# Patient Record
Sex: FEMALE | Race: WHITE | NOT HISPANIC OR LATINO | Employment: FULL TIME | URBAN - METROPOLITAN AREA
[De-identification: names, ages, dates, MRNs, and addresses within clinical notes are randomized per-mention and may not be internally consistent; named-entity substitution may affect disease eponyms.]

---

## 2017-01-02 DIAGNOSIS — F33.0 MILD RECURRENT MAJOR DEPRESSION (H): Primary | ICD-10-CM

## 2017-01-02 RX ORDER — SERTRALINE HYDROCHLORIDE 100 MG/1
200 TABLET, FILM COATED ORAL DAILY
Qty: 180 TABLET | Refills: 1 | Status: SHIPPED | OUTPATIENT
Start: 2017-01-02 | End: 2017-06-02

## 2017-01-02 NOTE — TELEPHONE ENCOUNTER
sertraline     Last Written Prescription Date: 10/20/16  Last Fill Quantity: 180, # refills: 0  [place] with [facility] [place] provider:  5/13/16        Last PHQ-9 score on record=   PHQ-9 SCORE 11/17/2016   Total Score -   Total Score MyChart -   Total Score 2

## 2017-01-02 NOTE — TELEPHONE ENCOUNTER
Prescription approved per [facility] Refill Protocol.  SHERIE Enamorado

## 2017-03-24 DIAGNOSIS — G47.00 INSOMNIA, UNSPECIFIED TYPE: ICD-10-CM

## 2017-03-27 RX ORDER — TRAZODONE HYDROCHLORIDE 50 MG/1
TABLET, FILM COATED ORAL
Qty: 90 TABLET | Refills: 0 | Status: SHIPPED | OUTPATIENT
Start: 2017-03-27 | End: 2017-06-27

## 2017-03-27 NOTE — TELEPHONE ENCOUNTER
Medication Detail      Disp Refills Start End TARI   traZODone (DESYREL) 50 MG tablet 90 tablet 1 10/19/2016  No   Sig: Take 1 tablet (50 mg) by mouth nightly as needed       Last Office Visit with [facility], [facility] or [facility] prescribing provider:  8/22/16        Last PHQ-9 score on record=   PHQ-9 SCORE 11/17/2016   Total Score MyChart -   Total Score 2       No results found for: AST  No results found for: ALT

## 2017-05-10 ENCOUNTER — OFFICE VISIT (OUTPATIENT)
Dept: FAMILY MEDICINE | Facility: CLINIC | Age: 52
End: 2017-05-10
Payer: COMMERCIAL

## 2017-05-10 VITALS
TEMPERATURE: 99.1 F | DIASTOLIC BLOOD PRESSURE: 102 MMHG | HEIGHT: 66 IN | OXYGEN SATURATION: 98 % | BODY MASS INDEX: 32.47 KG/M2 | HEART RATE: 95 BPM | RESPIRATION RATE: 16 BRPM | SYSTOLIC BLOOD PRESSURE: 162 MMHG | WEIGHT: 202 LBS

## 2017-05-10 DIAGNOSIS — J20.9 ACUTE BRONCHITIS, UNSPECIFIED ORGANISM: Primary | ICD-10-CM

## 2017-05-10 DIAGNOSIS — I10 HYPERTENSION GOAL BP (BLOOD PRESSURE) < 140/90: ICD-10-CM

## 2017-05-10 PROCEDURE — 99214 OFFICE O/P EST MOD 30 MIN: CPT | Performed by: FAMILY MEDICINE

## 2017-05-10 RX ORDER — DOXYCYCLINE 100 MG/1
100 CAPSULE ORAL 2 TIMES DAILY
Qty: 20 CAPSULE | Refills: 0 | Status: SHIPPED | OUTPATIENT
Start: 2017-05-10 | End: 2017-05-20

## 2017-05-10 RX ORDER — ALBUTEROL SULFATE 90 UG/1
2 AEROSOL, METERED RESPIRATORY (INHALATION) EVERY 4 HOURS PRN
Qty: 1 INHALER | Refills: 0 | Status: SHIPPED | OUTPATIENT
Start: 2017-05-10 | End: 2018-04-25

## 2017-05-10 NOTE — MR AVS SNAPSHOT
After Visit Summary   5/10/2017    Trini Ramirez    MRN: [id number]           Patient Information     Date Of Birth          1965        Visit Information        Provider Department      5/10/2017 9:00 AM Alejandro New MD [facility]        Today's Diagnoses     Acute bronchitis, unspecified organism    -  1    Hypertension goal BP (blood pressure) < 140/90           Follow-ups after your visit        Your next 10 appointments already scheduled     May 31, 2017  3:00 PM CDT   Nurse Only with HW MEDICAL ASSISTANT   [facility]    [address] [place] [address] [phone]              Who to contact     If you have questions or need follow up information about today's clinic visit or your schedule please contact [facility] directly at [phone].  Normal or non-critical lab and imaging results will be communicated to you by MyChart, letter or phone within 4 business days after the clinic has received the results. If you do not hear from us within 7 days, please contact the clinic through [employer] or phone. If you have a critical or abnormal lab result, we will notify you by phone as soon as possible.  Submit refill requests through [employer] or call your pharmacy and they will forward the refill request to us. Please allow 3 business days for your refill to be completed.          Additional Information About Your Visit        MyChart Information     [employer] gives you secure access to your electronic health record. If you see a primary care provider, you can also send messages to your care team and make appointments. If you have questions, please call your primary care clinic.  If you do not have a primary care provider, please call [phone] and they will assist you.        Care EveryWhere ID     This is your Care EveryWhere ID. This could be used by other organizations to access your [place]  "medical records  [id number]        Your Vitals Were     Pulse Temperature Respirations Height Pulse Oximetry BMI (Body Mass Index)    95 99.1  F (37.3  C) (Tympanic) 16 5' 6\" (1.676 m) 98% 32.6 kg/m2       Blood Pressure from Last 3 Encounters:   05/10/17 (!) 162/102   05/13/16 138/88   02/16/16 (!) 124/96    Weight from Last 3 Encounters:   05/10/17 202 lb (91.6 kg)   05/13/16 199 lb (90.3 kg)   02/16/16 193 lb (87.5 kg)              Today, you had the following     No orders found for display         Today's Medication Changes          These changes are accurate as of: 5/10/17 10:13 AM.  If you have any questions, ask your nurse or doctor.               Start taking these medicines.        Dose/Directions    albuterol 108 (90 BASE) MCG/ACT Inhaler   Commonly known as:  PROAIR HFA/PROVENTIL HFA/VENTOLIN HFA   Used for:  Acute bronchitis, unspecified organism        Dose:  2 puff   Inhale 2 puffs into the lungs every 4 hours as needed   Quantity:  1 Inhaler   Refills:  0       doxycycline 100 MG capsule   Commonly known as:  VIBRAMYCIN   Used for:  Acute bronchitis, unspecified organism        Dose:  100 mg   Take 1 capsule (100 mg) by mouth 2 times daily for 10 days   Quantity:  20 capsule   Refills:  0            Where to get your medicines      These medications were sent to [facility] [address] [place] [address] AT [facility] & [address] [place] [address]    Hours:  24-hours Phone:  [phone]     albuterol 108 (90 BASE) MCG/ACT Inhaler    doxycycline 100 MG capsule                Primary Care Provider Office Phone # Fax #    Lorrie Macias PA-C [phone]       [facility]       [address] [facility] [address]        Thank you!     Thank you for choosing [facility]  for your care. Our goal is always to provide you with excellent care. Hearing back from our patients is one way " we can continue to improve our services. Please take a few minutes to complete the written survey that you may receive in the mail after your visit with us. Thank you!             Your Updated Medication List - Protect others around you: Learn how to safely use, store and throw away your medicines at www.disposemymeds.org.          This list is accurate as of: 5/10/17 10:13 AM.  Always use your most recent med list.                   Brand Name Dispense Instructions for use    albuterol 108 (90 BASE) MCG/ACT Inhaler    PROAIR HFA/PROVENTIL HFA/VENTOLIN HFA    1 Inhaler    Inhale 2 puffs into the lungs every 4 hours as needed       doxycycline 100 MG capsule    VIBRAMYCIN    20 capsule    Take 1 capsule (100 mg) by mouth 2 times daily for 10 days       fluticasone 50 MCG/ACT spray    FLONASE    1 Package    Spray 1-2 sprays into both nostrils daily       sertraline 100 MG tablet    ZOLOFT    180 tablet    Take 2 tablets (200 mg) by mouth daily       traZODone 50 MG tablet    DESYREL    90 tablet    TAKE 1 TABLET(50 MG) BY MOUTH EVERY NIGHT AS NEEDED

## 2017-05-10 NOTE — NURSING NOTE
"Chief Complaint   Patient presents with     URI     cough       Initial BP (!) 160/110 (BP Location: Left arm, Patient Position: Chair, Cuff Size: Adult Regular)  Pulse 95  Temp 99.1  F (37.3  C) (Tympanic)  Resp 16  Ht 5' 6\" (1.676 m)  Wt 202 lb (91.6 kg)  SpO2 98%  BMI 32.6 kg/m2 Estimated body mass index is 32.6 kg/(m^2) as calculated from the following:    Height as of this encounter: 5' 6\" (1.676 m).    Weight as of this encounter: 202 lb (91.6 kg).  Medication Reconciliation: complete     Mackenzie Appiah      "

## 2017-05-10 NOTE — PROGRESS NOTES
"  SUBJECTIVE:                                                    Trini Ramirez is a 52 year old female who presents to clinic today for the following health issues:      RESPIRATORY SYMPTOMS      Duration: one week     Description  Dry cough, subjective fevers, shortness of breath while coughing, mild rhinorrhea and nasal congestion, mild sore throat    Accompanying signs and symptoms: see above     History (predisposing factors): none     Precipitating or alleviating factors: None    Therapies tried and outcome:  none     No recent travel.   No smoking.   Sleep not disrupted.     H/o HTN - Two years ago pt took antihypertensive for 2 months after her Mom . Since then her B/P's have been normal.       Problem list and histories reviewed & adjusted, as indicated.  Additional history: as documented    Labs reviewed in EPIC    Reviewed and updated as needed this visit by clinical staff  Allergies  Meds       Reviewed and updated as needed this visit by Provider         ROS:  Constitutional, HEENT, cardiovascular, pulmonary, gi and gu systems are negative, except as otherwise noted.    OBJECTIVE:                                                    BP (!) 160/110 (BP Location: Left arm, Patient Position: Chair, Cuff Size: Adult Regular)  Pulse 95  Temp 99.1  F (37.3  C) (Tympanic)  Resp 16  Ht 5' 6\" (1.676 m)  Wt 202 lb (91.6 kg)  SpO2 98%  BMI 32.6 kg/m2  Body mass index is 32.6 kg/(m^2).  GENERAL: healthy, alert and no distress, harsh sounding cough   HENT: mouth without ulcers or lesions  NECK: no adenopathy  RESP: lungs clear to auscultation - no rales, rhonchi or wheezes  CV: regular rate and rhythm, normal S1 S2, no S3 or S4    Diagnostic Test Results:  none      ASSESSMENT/PLAN:                                                      1. Acute bronchitis, unspecified organism  - continue symptomatic tx   - doxycycline (VIBRAMYCIN) 100 MG capsule; Take 1 capsule (100 mg) by mouth 2 times daily for 10 days  " Dispense: 20 capsule; Refill: 0  - albuterol (PROAIR HFA/PROVENTIL HFA/VENTOLIN HFA) 108 (90 BASE) MCG/ACT Inhaler; Inhale 2 puffs into the lungs every 4 hours as needed  Dispense: 1 Inhaler; Refill: 0  - Follow if symptoms worsen or fail to improve.    2. Hypertension goal BP (blood pressure) < 140/90  - Pt is currently not on anti-hypertensive's. Two years ago pt took antihypertensive for 2 months after her Mom . Since then her B/P's have been normal. Today her B/P was elevated. I recommended f/u in 1 week for MA blood pressure recheck.       Alejandro New MD  [facility]

## 2017-05-11 ASSESSMENT — PATIENT HEALTH QUESTIONNAIRE - PHQ9: SUM OF ALL RESPONSES TO PHQ QUESTIONS 1-9: 2

## 2017-05-12 ENCOUNTER — RADIANT APPOINTMENT (OUTPATIENT)
Dept: GENERAL RADIOLOGY | Facility: CLINIC | Age: 52
End: 2017-05-12
Attending: INTERNAL MEDICINE
Payer: COMMERCIAL

## 2017-05-12 ENCOUNTER — TELEPHONE (OUTPATIENT)
Dept: FAMILY MEDICINE | Facility: CLINIC | Age: 52
End: 2017-05-12

## 2017-05-12 ENCOUNTER — OFFICE VISIT (OUTPATIENT)
Dept: URGENT CARE | Facility: URGENT CARE | Age: 52
End: 2017-05-12
Payer: COMMERCIAL

## 2017-05-12 VITALS
TEMPERATURE: 98.1 F | OXYGEN SATURATION: 98 % | BODY MASS INDEX: 32.28 KG/M2 | WEIGHT: 200 LBS | DIASTOLIC BLOOD PRESSURE: 119 MMHG | SYSTOLIC BLOOD PRESSURE: 203 MMHG | HEART RATE: 92 BPM

## 2017-05-12 DIAGNOSIS — R05.9 COUGH: ICD-10-CM

## 2017-05-12 DIAGNOSIS — J20.9 ACUTE BRONCHITIS WITH SYMPTOMS > 10 DAYS: ICD-10-CM

## 2017-05-12 DIAGNOSIS — I10 UNCONTROLLED HYPERTENSION: ICD-10-CM

## 2017-05-12 DIAGNOSIS — J98.09 BRONCHOSPASTIC AIRWAY DISEASE: ICD-10-CM

## 2017-05-12 DIAGNOSIS — R05.9 COUGH: Primary | ICD-10-CM

## 2017-05-12 PROCEDURE — 99214 OFFICE O/P EST MOD 30 MIN: CPT | Mod: 25 | Performed by: INTERNAL MEDICINE

## 2017-05-12 PROCEDURE — 71020 XR CHEST 2 VW: CPT

## 2017-05-12 PROCEDURE — 94640 AIRWAY INHALATION TREATMENT: CPT | Performed by: INTERNAL MEDICINE

## 2017-05-12 RX ORDER — LISINOPRIL 10 MG/1
10 TABLET ORAL DAILY
Qty: 14 TABLET | Refills: 0 | Status: SHIPPED | OUTPATIENT
Start: 2017-05-12 | End: 2017-05-18

## 2017-05-12 RX ORDER — ALBUTEROL SULFATE 0.83 MG/ML
1 SOLUTION RESPIRATORY (INHALATION) ONCE
Qty: 1 VIAL | Refills: 0
Start: 2017-05-12 | End: 2020-03-18

## 2017-05-12 RX ORDER — PREDNISONE 20 MG/1
40 TABLET ORAL DAILY
Qty: 10 TABLET | Refills: 0 | Status: SHIPPED | OUTPATIENT
Start: 2017-05-12 | End: 2017-05-18

## 2017-05-12 RX ORDER — CODEINE PHOSPHATE AND GUAIFENESIN 10; 100 MG/5ML; MG/5ML
1 SOLUTION ORAL EVERY 4 HOURS PRN
Qty: 120 ML | Refills: 0 | Status: SHIPPED | OUTPATIENT
Start: 2017-05-12 | End: 2017-05-18

## 2017-05-12 NOTE — TELEPHONE ENCOUNTER
"Trini Ramirez is a 52 year old female who calls with worsening bronchitis.    NURSING ASSESSMENT:  Description:  Patient was seen 5/10/2017 - Mohamed - prescribe doxycycline and albuterol for bronchitis - patient calling due to worsening symptoms - seeks nursing advice  Onset/duration:  2 days  Precip. factors:  bronchitis  Associated symptoms:    1. Cough - started to have more \"crackles\"   -non-productive    2. Albuterol - every 4 hours - 2 puffs - ineffective - continues with wheezes - cannot clear with coughing  3. Denies SOB - states only when coughing  4. Denies ear pain, sinus congestion, body aches, nausea, vomiting, red eyes, rash      Last exam/Treatment:  5/10/2017  Allergies:   Allergies   Allergen Reactions     Seasonal Allergies        MEDICATIONS:   Taking medication(s) as prescribed? Yes  Taking over the counter medication(s?) unknown  Any medication side effects? No significant side effects    Any barriers to taking medication(s) as prescribed?  No  Medication(s) improving/managing symptoms?  No  Medication reconciliation completed: Yes      NURSING PLAN: Nursing advice to patient please seek UC at this time [facility] - open now for check in - to be evaluated same day patient verbalized understanding - no further questions at this time    RECOMMENDED DISPOSITION:  To ED/UC for evaluation, another person to drive - see above  Will comply with recommendation: Yes  If further questions/concerns or if symptoms do not improve, worsen or new symptoms develop, call your PCP or [facility] as soon as possible.      Guideline used:  Telephone Triage Protocols for Nurses, Fifth Edition, Deepali Howard RN    "

## 2017-05-12 NOTE — PATIENT INSTRUCTIONS
Start lisinopril 10 mg daily.  check blood pressure at home or fire station. Goal blood pressure less than 140/90. May need to take lisinopril 10 mg daily to meet goal.   Avoid caffeine and salt. No decongestants.      Continue albuterol inhaler for cough.  Start prednisone burst  robitussin with codeine.    Recheck appointment next week for cough and blood pressure.

## 2017-05-12 NOTE — TELEPHONE ENCOUNTER
Medication Detail      Disp Refills Start End TARI   lisinopril (PRINIVIL/ZESTRIL) 10 MG tablet 14 tablet 0 5/12/2017  --   Sig: Take 1 tablet (10 mg) by mouth daily   Class: E-Prescribe   Route: Oral   Order: [id number]       Last Office Visit with [facility], [facility] or [facility] prescribing provider: 8/22/2016  Next 5 appointments (look out 90 days)     May 18, 2017  4:00 PM CDT   Office Visit with Lorrie Macias PA-C   [facility]    [address] [place] [address] [phone]            May 31, 2017  3:00 PM CDT   Nurse Only with  MEDICAL ASSISTANT   [facility] ([facility])    [address] [place] [address] [phone]                   Potassium   Date Value Ref Range Status   02/12/2016 3.8 3.4 - 5.3 mmol/L Final     Creatinine   Date Value Ref Range Status   02/12/2016 0.69 0.52 - 1.04 mg/dL Final     BP Readings from Last 3 Encounters:   05/12/17 (!) 203/119   05/10/17 (!) 162/102   05/13/16 138/88     Patient is requesting a 90 day supply

## 2017-05-12 NOTE — MR AVS SNAPSHOT
After Visit Summary   5/12/2017    Trini Ramirez    MRN: [id number]           Patient Information     Date Of Birth          1965        Visit Information        Provider Department      5/12/2017 5:00 PM Crissy Mejia MD [facility]        Today's Diagnoses     Cough    -  1    Uncontrolled hypertension        Acute bronchitis with symptoms > 10 days        Bronchospastic airway disease          Care Instructions    Start lisinopril 10 mg daily.  check blood pressure at home or fire station. Goal blood pressure less than 140/90. May need to take lisinopril 10 mg daily to meet goal.   Avoid caffeine and salt. No decongestants.      Continue albuterol inhaler for cough.  Start prednisone burst  robitussin with codeine.    Recheck appointment next week for cough and blood pressure.              Follow-ups after your visit        Your next 10 appointments already scheduled     May 31, 2017  3:00 PM CDT   Nurse Only with HW MEDICAL ASSISTANT   [facility]    [address] [place] [address] [phone]              Who to contact     If you have questions or need follow up information about today's clinic visit or your schedule please contact [facility] directly at [phone].  Normal or non-critical lab and imaging results will be communicated to you by MyChart, letter or phone within 4 business days after the clinic has received the results. If you do not hear from us within 7 days, please contact the clinic through [employer] or phone. If you have a critical or abnormal lab result, we will notify you by phone as soon as possible.  Submit refill requests through [employer] or call your pharmacy and they will forward the refill request to us. Please allow 3 business days for your refill to be completed.          Additional Information About Your Visit        MyChart Information      [employer] gives you secure access to your electronic health record. If you see a primary care provider, you can also send messages to your care team and make appointments. If you have questions, please call your primary care clinic.  If you do not have a primary care provider, please call [phone] and they will assist you.        Care EveryWhere ID     This is your Care EveryWhere ID. This could be used by other organizations to access your [place] medical records  [id number]        Your Vitals Were     Pulse Temperature Pulse Oximetry BMI (Body Mass Index)          92 98.1  F (36.7  C) (Tympanic) 98% 32.28 kg/m2         Blood Pressure from Last 3 Encounters:   05/12/17 (!) 203/119   05/10/17 (!) 162/102   05/13/16 138/88    Weight from Last 3 Encounters:   05/12/17 200 lb (90.7 kg)   05/10/17 202 lb (91.6 kg)   05/13/16 199 lb (90.3 kg)              We Performed the Following     INHALATION/NEBULIZER TREATMENT, INITIAL          Today's Medication Changes          These changes are accurate as of: 5/12/17  5:52 PM.  If you have any questions, ask your nurse or doctor.               Start taking these medicines.        Dose/Directions    guaiFENesin-codeine 100-10 MG/5ML Soln solution   Commonly known as:  ROBITUSSIN AC   Used for:  Acute bronchitis with symptoms > 10 days, Cough, Bronchospastic airway disease   Started by:  Crissy Mejia MD        Dose:  1 tsp.   Take 5 mLs by mouth every 4 hours as needed for cough   Quantity:  120 mL   Refills:  0       lisinopril 10 MG tablet   Commonly known as:  PRINIVIL/ZESTRIL   Used for:  Cough   Started by:  Crissy Mejia MD        Dose:  10 mg   Take 1 tablet (10 mg) by mouth daily   Quantity:  14 tablet   Refills:  0       predniSONE 20 MG tablet   Commonly known as:  DELTASONE   Used for:  Cough, Acute bronchitis with symptoms > 10 days   Started by:  Crissy Mejia MD        Dose:  40 mg   Take 2 tablets (40 mg) by mouth daily for 5  days   Quantity:  10 tablet   Refills:  0         These medicines have changed or have updated prescriptions.        Dose/Directions    * albuterol 108 (90 BASE) MCG/ACT Inhaler   Commonly known as:  PROAIR HFA/PROVENTIL HFA/VENTOLIN HFA   This may have changed:  Another medication with the same name was added. Make sure you understand how and when to take each.   Used for:  Acute bronchitis, unspecified organism   Changed by:  Alejandro New MD        Dose:  2 puff   Inhale 2 puffs into the lungs every 4 hours as needed   Quantity:  1 Inhaler   Refills:  0       * albuterol (2.5 MG/3ML) 0.083% neb solution   This may have changed:  You were already taking a medication with the same name, and this prescription was added. Make sure you understand how and when to take each.   Used for:  Cough   Changed by:  Crissy Mejia MD        Dose:  1 vial   Take 1 vial (2.5 mg) by nebulization once for 1 dose   Quantity:  1 vial   Refills:  0       * Notice:  This list has 2 medication(s) that are the same as other medications prescribed for you. Read the directions carefully, and ask your doctor or other care provider to review them with you.         Where to get your medicines      These medications were sent to [facility] [address] [place] [id number]    Hours:  24-hours Phone:  [phone]     lisinopril 10 MG tablet    predniSONE 20 MG tablet         Some of these will need a paper prescription and others can be bought over the counter.  Ask your nurse if you have questions.     Bring a paper prescription for each of these medications     guaiFENesin-codeine 100-10 MG/5ML Soln solution       You don't need a prescription for these medications     albuterol (2.5 MG/3ML) 0.083% neb solution                Primary Care Provider Office Phone # Fax #    Lorrie Macias PA-C [phone]        [facility]       [address]            [facility] [address]        Thank you!     Thank you for choosing [facility]  for your care. Our goal is always to provide you with excellent care. Hearing back from our patients is one way we can continue to improve our services. Please take a few minutes to complete the written survey that you may receive in the mail after your visit with us. Thank you!             Your Updated Medication List - Protect others around you: Learn how to safely use, store and throw away your medicines at www.disposemymeds.org.          This list is accurate as of: 5/12/17  5:52 PM.  Always use your most recent med list.                   Brand Name Dispense Instructions for use    * albuterol 108 (90 BASE) MCG/ACT Inhaler    PROAIR HFA/PROVENTIL HFA/VENTOLIN HFA    1 Inhaler    Inhale 2 puffs into the lungs every 4 hours as needed       * albuterol (2.5 MG/3ML) 0.083% neb solution     1 vial    Take 1 vial (2.5 mg) by nebulization once for 1 dose       doxycycline 100 MG capsule    VIBRAMYCIN    20 capsule    Take 1 capsule (100 mg) by mouth 2 times daily for 10 days       fluticasone 50 MCG/ACT spray    FLONASE    1 Package    Spray 1-2 sprays into both nostrils daily       guaiFENesin-codeine 100-10 MG/5ML Soln solution    ROBITUSSIN AC    120 mL    Take 5 mLs by mouth every 4 hours as needed for cough       lisinopril 10 MG tablet    PRINIVIL/ZESTRIL    14 tablet    Take 1 tablet (10 mg) by mouth daily       predniSONE 20 MG tablet    DELTASONE    10 tablet    Take 2 tablets (40 mg) by mouth daily for 5 days       sertraline 100 MG tablet    ZOLOFT    180 tablet    Take 2 tablets (200 mg) by mouth daily       traZODone 50 MG tablet    DESYREL    90 tablet    TAKE 1 TABLET(50 MG) BY MOUTH EVERY NIGHT AS NEEDED       * Notice:  This list has 2 medication(s) that are the same as other medications prescribed for you. Read the directions  carefully, and ask your doctor or other care provider to review them with you.

## 2017-05-12 NOTE — NURSING NOTE
"Chief Complaint   Patient presents with     Urgent Care     Pt in clinic c/o worsening cough.     Cough       Initial BP (!) 203/119 (BP Location: Right arm, Patient Position: Chair, Cuff Size: Adult Large)  Pulse 92  Temp 98.1  F (36.7  C) (Tympanic)  Wt 200 lb (90.7 kg)  SpO2 98%  BMI 32.28 kg/m2 Estimated body mass index is 32.28 kg/(m^2) as calculated from the following:    Height as of 5/10/17: 5' 6\" (1.676 m).    Weight as of this encounter: 200 lb (90.7 kg).  Medication Reconciliation: complete   Vanessa Guido/ MA    "

## 2017-05-12 NOTE — PROGRESS NOTES
SUBJECTIVE:  Trini Ramirez, a 52 year old female scheduled an appointment to discuss the following issues:  Chief Complaint   Patient presents with     Urgent Care     Pt in clinic c/o worsening cough.     Cough       At  reviewed chart with providers assessment and plan below  BP Readings from Last 6 Encounters:   17 (!) 203/119   05/10/17 (!) 162/102   16 138/88   16 (!) 124/96   16 (!) 149/105   02/10/16 (!) 140/100     1. Acute bronchitis, unspecified organism  - continue symptomatic tx   - doxycycline (VIBRAMYCIN) 100 MG capsule; Take 1 capsule (100 mg) by mouth 2 times daily for 10 days Dispense: 20 capsule; Refill: 0  - albuterol (PROAIR HFA/PROVENTIL HFA/VENTOLIN HFA) 108 (90 BASE) MCG/ACT Inhaler; Inhale 2 puffs into the lungs every 4 hours as needed Dispense: 1 Inhaler; Refill: 0  - Follow if symptoms worsen or fail to improve.     2. Hypertension goal BP (blood pressure) < 140/90  - Pt is currently not on anti-hypertensive's. Two years ago pt took antihypertensive for 2 months after her Mom . Since then her B/P's have been normal. Today her B/P was elevated. I recommended f/u in 1 week for MA blood pressure recheck.     Patient states she is getting worse since her appointment noted above.  Sick 10 days  Getting worse    Current Outpatient Prescriptions on File Prior to Visit:  doxycycline (VIBRAMYCIN) 100 MG capsule Take 1 capsule (100 mg) by mouth 2 times daily for 10 days   albuterol (PROAIR HFA/PROVENTIL HFA/VENTOLIN HFA) 108 (90 BASE) MCG/ACT Inhaler Inhale 2 puffs into the lungs every 4 hours as needed   traZODone (DESYREL) 50 MG tablet TAKE 1 TABLET(50 MG) BY MOUTH EVERY NIGHT AS NEEDED   sertraline (ZOLOFT) 100 MG tablet Take 2 tablets (200 mg) by mouth daily   fluticasone (FLONASE) 50 MCG/ACT nasal spray Spray 1-2 sprays into both nostrils daily (Patient not taking: Reported on 2017)     No current facility-administered medications on file prior to visit.    Review of chart shows that patient has been on lisinopril and ( labetalol and hydralazine - inpatient)    She is a non-smoker.      Medical, social, surgical, and family histories reviewed and updated as of 5/12/2017.    ROS:  C: NEGATIVE for fever, chills  E: NEGATIVE for vision changes   ENT/MOUTH: Runny stuffy nose  R: She has a wet nonproductive cough with trouble breathing during her coughing spells. When she is laying down at night she thinks she might be wheezing     CV: NEGATIVE for chest pain, palpitations   NEURO: NEGATIVE for weakness, dizziness or paresthesias and denies symptoms of stroke. Denies headache    She states 20 or 30 years ago she used to get a cough like this a year    OBJECTIVE:  BP (!) 203/119 (BP Location: Right arm, Patient Position: Chair, Cuff Size: Adult Large)  Pulse 92  Temp 98.1  F (36.7  C) (Tympanic)  Wt 200 lb (90.7 kg)  SpO2 98%  BMI 32.28 kg/m2  EXAM:  GENERAL APPEARANCE: healthy, alert and no distress  GENERAL APPEARANCE: Tearful  HENT: ear canals and TM's normal and nose and mouth without ulcers or lesions  RESP: lungs clear to auscultation - no rales, rhonchi or wheezes  CV: regular rates and rhythm, normal S1 S2, no S3 or S4 and no murmur, click or rub -    Neb patient states she feels better after the nab. Her coughing frequency significantly decreased in clinic. Lung exam unchanged good aeration without wheeze  chest x-ray appears normal  ASSESSMENT/PLAN:    ASSESSMENT/PLAN:      ICD-10-CM    1. Cough R05 XR Chest 2 Views     albuterol (2.5 MG/3ML) 0.083% neb solution     INHALATION/NEBULIZER TREATMENT, INITIAL     lisinopril (PRINIVIL/ZESTRIL) 10 MG tablet     predniSONE (DELTASONE) 20 MG tablet     guaiFENesin-codeine (ROBITUSSIN AC) 100-10 MG/5ML SOLN solution   2. Uncontrolled hypertension I10    3. Acute bronchitis with symptoms > 10 days J20.9 predniSONE (DELTASONE) 20 MG tablet     guaiFENesin-codeine (ROBITUSSIN AC) 100-10 MG/5ML SOLN solution   4.  Bronchospastic airway disease J98.09 guaiFENesin-codeine (ROBITUSSIN AC) 100-10 MG/5ML SOLN solution       Patient Instructions   Start lisinopril 10 mg daily.  check blood pressure at home or fire station. Goal blood pressure less than 140/90. May need to take lisinopril 10 mg daily to meet goal.   Avoid caffeine and salt. No decongestants.      Continue albuterol inhaler for cough.  Start prednisone burst  robitussin with codeine.    Recheck appointment next week for cough and blood pressure.              Crissy Mejia MD

## 2017-05-16 RX ORDER — LISINOPRIL 10 MG/1
TABLET ORAL
Qty: 0.1 TABLET | Refills: 0 | OUTPATIENT
Start: 2017-05-16

## 2017-05-18 ENCOUNTER — OFFICE VISIT (OUTPATIENT)
Dept: FAMILY MEDICINE | Facility: CLINIC | Age: 52
End: 2017-05-18
Payer: COMMERCIAL

## 2017-05-18 VITALS
BODY MASS INDEX: 33.41 KG/M2 | OXYGEN SATURATION: 98 % | WEIGHT: 207 LBS | SYSTOLIC BLOOD PRESSURE: 178 MMHG | HEART RATE: 80 BPM | RESPIRATION RATE: 17 BRPM | TEMPERATURE: 97.8 F | DIASTOLIC BLOOD PRESSURE: 102 MMHG

## 2017-05-18 DIAGNOSIS — J98.09 BRONCHOSPASTIC AIRWAY DISEASE: ICD-10-CM

## 2017-05-18 DIAGNOSIS — R05.9 COUGH: ICD-10-CM

## 2017-05-18 DIAGNOSIS — J20.9 ACUTE BRONCHITIS WITH SYMPTOMS > 10 DAYS: ICD-10-CM

## 2017-05-18 DIAGNOSIS — I10 HYPERTENSION GOAL BP (BLOOD PRESSURE) < 140/90: Primary | ICD-10-CM

## 2017-05-18 PROCEDURE — 99213 OFFICE O/P EST LOW 20 MIN: CPT | Performed by: PHYSICIAN ASSISTANT

## 2017-05-18 RX ORDER — PREDNISONE 20 MG/1
40 TABLET ORAL DAILY
Qty: 8 TABLET | Refills: 0 | Status: SHIPPED | OUTPATIENT
Start: 2017-05-18 | End: 2017-05-22

## 2017-05-18 RX ORDER — LISINOPRIL 20 MG/1
20 TABLET ORAL DAILY
Qty: 90 TABLET | Refills: 0 | Status: SHIPPED | OUTPATIENT
Start: 2017-05-18 | End: 2017-08-22

## 2017-05-18 RX ORDER — CODEINE PHOSPHATE AND GUAIFENESIN 10; 100 MG/5ML; MG/5ML
1 SOLUTION ORAL EVERY 4 HOURS PRN
Qty: 120 ML | Refills: 0 | Status: SHIPPED | OUTPATIENT
Start: 2017-05-18 | End: 2017-06-01

## 2017-05-18 NOTE — NURSING NOTE
"Chief Complaint   Patient presents with     Hypertension     Cough       Initial BP (!) 178/102 (BP Location: Left arm, Patient Position: Chair, Cuff Size: Adult Large)  Pulse 80  Temp 97.8  F (36.6  C) (Oral)  Resp 17  Wt 207 lb (93.9 kg)  SpO2 98%  BMI 33.41 kg/m2 Estimated body mass index is 33.41 kg/(m^2) as calculated from the following:    Height as of 5/10/17: 5' 6\" (1.676 m).    Weight as of this encounter: 207 lb (93.9 kg).  Medication Reconciliation: complete     Gus Acevedo CMA  "

## 2017-05-18 NOTE — PROGRESS NOTES
SUBJECTIVE:                                                    Trini Ramirez is a 52 year old female who presents to clinic today for the following health issues:      Hypertension Follow-up      Outpatient blood pressures are being checked at home.  Results are 190/110.    Low Salt Diet: not monitoring salt    Recently restarted on Lisinopril 10 mg daily.    History of being on two medicines in the past, but BPs normalized until recently (mother passed away).       Amount of exercise or physical activity: 1-2 day/week for an average of 15-30 minutes    Problems taking medications regularly: No    Medication side effects: none    Diet: regular (no restrictions)    Patient seen a few times for bronchitis - most recent  visit on 2017 - chest xray negative at that time.    Given prescription for doxycycline x 10 days, prednisone x 5 days and rescue inhaler (albuterol)     Still coughing for 8 days,ribs are sore, sore throat; no fever        Problem list and histories reviewed & adjusted, as indicated.  Additional history: as documented    Patient Active Problem List   Diagnosis     Mild recurrent major depression (H)     CARDIOVASCULAR SCREENING; LDL GOAL LESS THAN 160     Seasonal allergic rhinitis     Metatarsalgia of right foot     Hypertension goal BP (blood pressure) < 140/90     Other closed extra-articular fracture of distal end of left radius, initial encounter     Past Surgical History:   Procedure Laterality Date     AS TMJ ARTHROSCOPY/SURGERY        SECTION       OPEN REDUCTION INTERNAL FIXATION WRIST Left 2016    Procedure: OPEN REDUCTION INTERNAL FIXATION WRIST;  Surgeon: Fritz Reid MD;  Location:  OR       Social History   Substance Use Topics     Smoking status: Never Smoker     Smokeless tobacco: Never Used     Alcohol use 0.0 oz/week     0 Standard drinks or equivalent per week      Comment: occ     Family History   Problem Relation Age of Onset     Depression  Mother      Hypertension Father      Depression Father      Substance Abuse Father      Depression Brother      DIABETES Maternal Grandfather      Substance Abuse Brother          Current Outpatient Prescriptions   Medication Sig Dispense Refill     predniSONE (DELTASONE) 20 MG tablet Take 2 tablets (40 mg) by mouth daily for 4 days 8 tablet 0     lisinopril (PRINIVIL/ZESTRIL) 20 MG tablet Take 1 tablet (20 mg) by mouth daily 90 tablet 0     guaiFENesin-codeine (ROBITUSSIN AC) 100-10 MG/5ML SOLN solution Take 5 mLs by mouth every 4 hours as needed for cough 120 mL 0     albuterol (PROAIR HFA/PROVENTIL HFA/VENTOLIN HFA) 108 (90 BASE) MCG/ACT Inhaler Inhale 2 puffs into the lungs every 4 hours as needed 1 Inhaler 0     traZODone (DESYREL) 50 MG tablet TAKE 1 TABLET(50 MG) BY MOUTH EVERY NIGHT AS NEEDED 90 tablet 0     sertraline (ZOLOFT) 100 MG tablet Take 2 tablets (200 mg) by mouth daily 180 tablet 1     fluticasone (FLONASE) 50 MCG/ACT nasal spray Spray 1-2 sprays into both nostrils daily 1 Package 6     [DISCONTINUED] lisinopril (PRINIVIL/ZESTRIL) 10 MG tablet Take 1 tablet (10 mg) by mouth daily 14 tablet 0     doxycycline (VIBRAMYCIN) 100 MG capsule Take 1 capsule (100 mg) by mouth 2 times daily for 10 days (Patient not taking: Reported on 5/18/2017) 20 capsule 0     Allergies   Allergen Reactions     Seasonal Allergies      BP Readings from Last 3 Encounters:   05/18/17 (!) 178/102   05/12/17 (!) 203/119   05/10/17 (!) 162/102    Wt Readings from Last 3 Encounters:   05/18/17 207 lb (93.9 kg)   05/12/17 200 lb (90.7 kg)   05/10/17 202 lb (91.6 kg)            Reviewed and updated as needed this visit by clinical staff  Tobacco  Allergies  Meds  Problems  Med Hx  Surg Hx  Fam Hx  Soc Hx        Reviewed and updated as needed this visit by Provider  Allergies  Meds  Problems         ROS:  Constitutional, HEENT, cardiovascular, pulmonary, gi and gu systems are negative, except as otherwise  noted.    OBJECTIVE:                                                    BP (!) 178/102 (BP Location: Left arm, Patient Position: Chair, Cuff Size: Adult Large)  Pulse 80  Temp 97.8  F (36.6  C) (Oral)  Resp 17  Wt 207 lb (93.9 kg)  SpO2 98%  BMI 33.41 kg/m2  Body mass index is 33.41 kg/(m^2).  GENERAL: healthy, alert and no distress  EYES: Eyes grossly normal to inspection, PERRL and conjunctivae and sclerae normal  HENT: ear canals and TM's normal, nose and mouth without ulcers or lesions  NECK: no adenopathy, no asymmetry, masses, or scars and thyroid normal to palpation  RESP: lungs clear to auscultation - no rales, rhonchi or wheezes  CV: regular rate and rhythm, normal S1 S2, no S3 or S4, no murmur, click or rub, no peripheral edema and peripheral pulses strong  PSYCH: mentation appears normal, affect normal/bright    Diagnostic Test Results:  none      ASSESSMENT/PLAN:                                                        ICD-10-CM    1. Hypertension goal BP (blood pressure) < 140/90 I10    2. Acute bronchitis with symptoms > 10 days J20.9 predniSONE (DELTASONE) 20 MG tablet     guaiFENesin-codeine (ROBITUSSIN AC) 100-10 MG/5ML SOLN solution   3. Cough R05 predniSONE (DELTASONE) 20 MG tablet     lisinopril (PRINIVIL/ZESTRIL) 20 MG tablet     guaiFENesin-codeine (ROBITUSSIN AC) 100-10 MG/5ML SOLN solution   4. Bronchospastic airway disease J98.09 guaiFENesin-codeine (ROBITUSSIN AC) 100-10 MG/5ML SOLN solution       Patient Instructions   Increase Lisinopril to 20 mg daily then follow up with MA visit as previously scheduled.    Encouraged mucinex/guafenisin, warm salt water gargles, cepacol spray, soothers/lozenges, sinus rinses (neilmed), vitamin c, fluids and rest.  May alternate tylenol and NSAIDS (ibuprofen, advil, aleve type products) every 4-6 hours for the next few days as needed.    Complete previous prescription for doxycycline.  Continue with rescue inhaler (albuterol) - 2 puffs every 4-6 hours  as needed.  Prescription for repeat round of oral prednisone sent to pharmacy as well.  Return to clinic with any worsening or changes in symptoms.       Lorrie Macias PA-C  [facility]

## 2017-05-18 NOTE — PATIENT INSTRUCTIONS
Increase Lisinopril to 20 mg daily then follow up with MA visit as previously scheduled.    Encouraged mucinex/guafenisin, warm salt water gargles, cepacol spray, soothers/lozenges, sinus rinses (neilmed), vitamin c, fluids and rest.  May alternate tylenol and NSAIDS (ibuprofen, advil, aleve type products) every 4-6 hours for the next few days as needed.    Complete previous prescription for doxycycline.  Continue with rescue inhaler (albuterol) - 2 puffs every 4-6 hours as needed.  Prescription for repeat round of oral prednisone sent to pharmacy as well.  Return to clinic with any worsening or changes in symptoms.

## 2017-05-18 NOTE — MR AVS SNAPSHOT
After Visit Summary   5/18/2017    Trini Ramirez    MRN: [id number]           Patient Information     Date Of Birth          1965        Visit Information        Provider Department      5/18/2017 4:00 PM Lorrie Macias PA-C [facility]        Today's Diagnoses     Hypertension goal BP (blood pressure) < 140/90    -  1    Cough        Acute bronchitis with symptoms > 10 days        Bronchospastic airway disease          Care Instructions    Increase Lisinopril to 20 mg daily then follow up with MA visit as previously scheduled.    Encouraged mucinex/guafenisin, warm salt water gargles, cepacol spray, soothers/lozenges, sinus rinses (neilmed), vitamin c, fluids and rest.  May alternate tylenol and NSAIDS (ibuprofen, advil, aleve type products) every 4-6 hours for the next few days as needed.    Complete previous prescription for doxycycline.  Continue with rescue inhaler (albuterol) - 2 puffs every 4-6 hours as needed.  Prescription for repeat round of oral prednisone sent to pharmacy as well.  Return to clinic with any worsening or changes in symptoms.         Follow-ups after your visit        Follow-up notes from your care team     Return if symptoms worsen or fail to improve.      Your next 10 appointments already scheduled     May 31, 2017  3:00 PM CDT   Nurse Only with [facility] MEDICAL ASSISTANT   [facility] ([facility])    [address] [place] [id number] [phone]              Who to contact     If you have questions or need follow up information about today's clinic visit or your schedule please contact [facility] directly at [phone].  Normal or non-critical lab and imaging results will be communicated to you by MyChart, letter or phone within 4 business days after the clinic has received the results. If you do not hear from us within 7 days, please contact the clinic through MyChart or phone. If  you have a critical or abnormal lab result, we will notify you by phone as soon as possible.  Submit refill requests through [employer] or call your pharmacy and they will forward the refill request to us. Please allow 3 business days for your refill to be completed.          Additional Information About Your Visit        [employer] Information     [employer] gives you secure access to your electronic health record. If you see a primary care provider, you can also send messages to your care team and make appointments. If you have questions, please call your primary care clinic.  If you do not have a primary care provider, please call [phone] and they will assist you.        Care EveryWhere ID     This is your Care EveryWhere ID. This could be used by other organizations to access your [place] medical records  [id number]        Your Vitals Were     Pulse Temperature Respirations Pulse Oximetry BMI (Body Mass Index)       80 97.8  F (36.6  C) (Oral) 17 98% 33.41 kg/m2        Blood Pressure from Last 3 Encounters:   05/18/17 (!) 178/102   05/12/17 (!) 203/119   05/10/17 (!) 162/102    Weight from Last 3 Encounters:   05/18/17 207 lb (93.9 kg)   05/12/17 200 lb (90.7 kg)   05/10/17 202 lb (91.6 kg)              Today, you had the following     No orders found for display         Today's Medication Changes          These changes are accurate as of: 5/18/17  4:11 PM.  If you have any questions, ask your nurse or doctor.               Start taking these medicines.        Dose/Directions    predniSONE 20 MG tablet   Commonly known as:  DELTASONE   Used for:  Cough, Acute bronchitis with symptoms > 10 days   Started by:  Lorrie Macias PA-C        Dose:  40 mg   Take 2 tablets (40 mg) by mouth daily for 4 days   Quantity:  8 tablet   Refills:  0         These medicines have changed or have updated prescriptions.        Dose/Directions    lisinopril 20 MG tablet   Commonly known as:  PRINIVIL/ZESTRIL   This may have  changed:    - medication strength  - how much to take   Used for:  Cough   Changed by:  Lorrie Macias PA-C        Dose:  20 mg   Take 1 tablet (20 mg) by mouth daily   Quantity:  90 tablet   Refills:  0            Where to get your medicines      These medications were sent to [id number] - [address] AT [facility] & [address] [place] [id number]    Hours:  24-hours Phone:  [phone]     lisinopril 20 MG tablet    predniSONE 20 MG tablet         Some of these will need a paper prescription and others can be bought over the counter.  Ask your nurse if you have questions.     Bring a paper prescription for each of these medications     guaiFENesin-codeine 100-10 MG/5ML Soln solution                Primary Care Provider Office Phone # Fax #    Lorrie Macias PA-C [phone]       [place] [address] [facility] [address]        Thank you!     Thank you for choosing [facility]  for your care. Our goal is always to provide you with excellent care. Hearing back from our patients is one way we can continue to improve our services. Please take a few minutes to complete the written survey that you may receive in the mail after your visit with us. Thank you!             Your Updated Medication List - Protect others around you: Learn how to safely use, store and throw away your medicines at www.disposemymeds.org.          This list is accurate as of: 5/18/17  4:11 PM.  Always use your most recent med list.                   Brand Name Dispense Instructions for use    * albuterol 108 (90 BASE) MCG/ACT Inhaler    PROAIR HFA/PROVENTIL HFA/VENTOLIN HFA    1 Inhaler    Inhale 2 puffs into the lungs every 4 hours as needed       * albuterol (2.5 MG/3ML) 0.083% neb solution     1 vial    Take 1 vial (2.5 mg) by nebulization once for 1 dose       doxycycline 100 MG  capsule    VIBRAMYCIN    20 capsule    Take 1 capsule (100 mg) by mouth 2 times daily for 10 days       fluticasone 50 MCG/ACT spray    FLONASE    1 Package    Spray 1-2 sprays into both nostrils daily       guaiFENesin-codeine 100-10 MG/5ML Soln solution    ROBITUSSIN AC    120 mL    Take 5 mLs by mouth every 4 hours as needed for cough       lisinopril 20 MG tablet    PRINIVIL/ZESTRIL    90 tablet    Take 1 tablet (20 mg) by mouth daily       predniSONE 20 MG tablet    DELTASONE    8 tablet    Take 2 tablets (40 mg) by mouth daily for 4 days       sertraline 100 MG tablet    ZOLOFT    180 tablet    Take 2 tablets (200 mg) by mouth daily       traZODone 50 MG tablet    DESYREL    90 tablet    TAKE 1 TABLET(50 MG) BY MOUTH EVERY NIGHT AS NEEDED       * Notice:  This list has 2 medication(s) that are the same as other medications prescribed for you. Read the directions carefully, and ask your doctor or other care provider to review them with you.

## 2017-06-01 ENCOUNTER — RADIANT APPOINTMENT (OUTPATIENT)
Dept: GENERAL RADIOLOGY | Facility: CLINIC | Age: 52
End: 2017-06-01
Attending: PHYSICIAN ASSISTANT
Payer: COMMERCIAL

## 2017-06-01 ENCOUNTER — OFFICE VISIT (OUTPATIENT)
Dept: FAMILY MEDICINE | Facility: CLINIC | Age: 52
End: 2017-06-01
Payer: COMMERCIAL

## 2017-06-01 VITALS
DIASTOLIC BLOOD PRESSURE: 109 MMHG | WEIGHT: 204 LBS | HEART RATE: 106 BPM | SYSTOLIC BLOOD PRESSURE: 162 MMHG | TEMPERATURE: 97.4 F | BODY MASS INDEX: 32.93 KG/M2 | OXYGEN SATURATION: 98 %

## 2017-06-01 DIAGNOSIS — I10 HYPERTENSION GOAL BP (BLOOD PRESSURE) < 140/90: ICD-10-CM

## 2017-06-01 DIAGNOSIS — J20.9 ACUTE BRONCHITIS, UNSPECIFIED ORGANISM: ICD-10-CM

## 2017-06-01 DIAGNOSIS — R05.9 COUGH: ICD-10-CM

## 2017-06-01 DIAGNOSIS — J98.09 BRONCHOSPASTIC AIRWAY DISEASE: ICD-10-CM

## 2017-06-01 DIAGNOSIS — J20.9 ACUTE BRONCHITIS, UNSPECIFIED ORGANISM: Primary | ICD-10-CM

## 2017-06-01 PROCEDURE — 71020 XR CHEST 2 VW: CPT

## 2017-06-01 PROCEDURE — 99213 OFFICE O/P EST LOW 20 MIN: CPT | Performed by: PHYSICIAN ASSISTANT

## 2017-06-01 RX ORDER — LISINOPRIL 40 MG/1
40 TABLET ORAL DAILY
Qty: 90 TABLET | Refills: 0 | Status: CANCELLED | OUTPATIENT
Start: 2017-06-01

## 2017-06-01 RX ORDER — CODEINE PHOSPHATE AND GUAIFENESIN 10; 100 MG/5ML; MG/5ML
1 SOLUTION ORAL EVERY 4 HOURS PRN
Qty: 120 ML | Refills: 0 | Status: SHIPPED | OUTPATIENT
Start: 2017-06-01 | End: 2017-07-11

## 2017-06-01 NOTE — PROGRESS NOTES
"Sol Feliz  Your attached chest xray is negative/normal. No need for oral antibiotic at this time.  Please contact the office with any questions or concerns.    Lorrie Ball \"Salvatore\" CHEYANNE Macias  "

## 2017-06-01 NOTE — PATIENT INSTRUCTIONS
Continue with Lisinopril to 20 mg daily for the next few weeks then follow up with MA visit.  Consider increase to Lisinopril 40 mg if no improvement in Bp reading at that time.     Encouraged mucinex/guafenisin, warm salt water gargles, cepacol spray, soothers/lozenges, sinus rinses (neilmed), vitamin c, fluids and rest.  May alternate tylenol and NSAIDS (ibuprofen, advil, aleve type products) every 4-6 hours for the next few days as needed.      Repeat chest xray today and consider oral antibiotic pending this.    Trial of daily inhaled steroid (pending insurance coverage) x the next few weeks. Rinse mouth after inhaler use.  Consider another repeat round of oral prednisone if no improvement with above.  Continue with rescue inhaler (albuterol) - 2 puffs every 4-6 hours as needed.    Return to clinic with any worsening or changes in symptoms.

## 2017-06-01 NOTE — MR AVS SNAPSHOT
After Visit Summary   6/1/2017    Trini Ramirez    MRN: [id number]           Patient Information     Date Of Birth          1965        Visit Information        Provider Department      6/1/2017 9:40 AM Lorrie Macias PA-C [facility]        Today's Diagnoses     Acute bronchitis, unspecified organism    -  1    Cough        Hypertension goal BP (blood pressure) < 140/90          Care Instructions    Continue with Lisinopril to 20 mg daily for the next few weeks then follow up with MA visit.  Consider increase to Lisinopril 40 mg if no improvement in Bp reading at that time.     Encouraged mucinex/guafenisin, warm salt water gargles, cepacol spray, soothers/lozenges, sinus rinses (neilmed), vitamin c, fluids and rest.  May alternate tylenol and NSAIDS (ibuprofen, advil, aleve type products) every 4-6 hours for the next few days as needed.      Repeat chest xray today and consider oral antibiotic pending this.    Trial of daily inhaled steroid (pending insurance coverage) x the next few weeks. Rinse mouth after inhaler use.  Consider another repeat round of oral prednisone if no improvement with above.  Continue with rescue inhaler (albuterol) - 2 puffs every 4-6 hours as needed.    Return to clinic with any worsening or changes in symptoms.           Follow-ups after your visit        Follow-up notes from your care team     Return if symptoms worsen or fail to improve.      Future tests that were ordered for you today     Open Future Orders        Priority Expected Expires Ordered    XR Chest 2 Views Routine 6/1/2017 6/1/2018 6/1/2017            Who to contact     If you have questions or need follow up information about today's clinic visit or your schedule please contact [facility] directly at [phone].  Normal or non-critical lab and imaging results will be communicated to you by MyChart, letter or phone within 4 business days after the clinic has  received the results. If you do not hear from us within 7 days, please contact the clinic through [employer] or phone. If you have a critical or abnormal lab result, we will notify you by phone as soon as possible.  Submit refill requests through [employer] or call your pharmacy and they will forward the refill request to us. Please allow 3 business days for your refill to be completed.          Additional Information About Your Visit        [email or web address] Information     [employer] gives you secure access to your electronic health record. If you see a primary care provider, you can also send messages to your care team and make appointments. If you have questions, please call your primary care clinic.  If you do not have a primary care provider, please call [phone] and they will assist you.        Care EveryWhere ID     This is your Care EveryWhere ID. This could be used by other organizations to access your [place] medical records  [id number]        Your Vitals Were     Pulse Temperature Pulse Oximetry BMI (Body Mass Index)          106 97.4  F (36.3  C) (Tympanic) 98% 32.93 kg/m2         Blood Pressure from Last 3 Encounters:   06/01/17 (!) 162/109   05/18/17 (!) 178/102   05/12/17 (!) 203/119    Weight from Last 3 Encounters:   06/01/17 204 lb (92.5 kg)   05/18/17 207 lb (93.9 kg)   05/12/17 200 lb (90.7 kg)                 Today's Medication Changes          These changes are accurate as of: 6/1/17  9:48 AM.  If you have any questions, ask your nurse or doctor.               Start taking these medicines.        Dose/Directions    mometasone-formoterol 100-5 MCG/ACT oral inhaler   Commonly known as:  DULERA   Used for:  Cough, Acute bronchitis, unspecified organism   Started by:  Lorrie Macias PA-C        Dose:  2 puff   Inhale 2 puffs into the lungs 2 times daily   Quantity:  13 g   Refills:  1            Where to get your medicines      These medications were sent to [facility] -  [place], [place] - [address]  [address], [facility] [address]     Phone:  [phone]     mometasone-formoterol 100-5 MCG/ACT oral inhaler                Primary Care Provider Office Phone # Fax #    Lorrie Sree Macias PA-C [phone]       [facility]       [address]            [facility] [place] [address]        Thank you!     Thank you for choosing [facility]  for your care. Our goal is always to provide you with excellent care. Hearing back from our patients is one way we can continue to improve our services. Please take a few minutes to complete the written survey that you may receive in the mail after your visit with us. Thank you!             Your Updated Medication List - Protect others around you: Learn how to safely use, store and throw away your medicines at www.disposemymeds.org.          This list is accurate as of: 6/1/17  9:48 AM.  Always use your most recent med list.                   Brand Name Dispense Instructions for use    albuterol 108 (90 BASE) MCG/ACT Inhaler    PROAIR HFA/PROVENTIL HFA/VENTOLIN HFA    1 Inhaler    Inhale 2 puffs into the lungs every 4 hours as needed       fluticasone 50 MCG/ACT spray    FLONASE    1 Package    Spray 1-2 sprays into both nostrils daily       guaiFENesin-codeine 100-10 MG/5ML Soln solution    ROBITUSSIN AC    120 mL    Take 5 mLs by mouth every 4 hours as needed for cough       lisinopril 20 MG tablet    PRINIVIL/ZESTRIL    90 tablet    Take 1 tablet (20 mg) by mouth daily       mometasone-formoterol 100-5 MCG/ACT oral inhaler    DULERA    13 g    Inhale 2 puffs into the lungs 2 times daily       sertraline 100 MG tablet    ZOLOFT    180 tablet    Take 2 tablets (200 mg) by mouth daily       traZODone 50 MG tablet    DESYREL    90 tablet    TAKE 1 TABLET(50 MG) BY MOUTH EVERY NIGHT AS NEEDED

## 2017-06-01 NOTE — NURSING NOTE
"Chief Complaint   Patient presents with     URI       Initial Pulse 106  Temp 97.4  F (36.3  C) (Tympanic)  Wt 204 lb (92.5 kg)  SpO2 98%  BMI 32.93 kg/m2 Estimated body mass index is 32.93 kg/(m^2) as calculated from the following:    Height as of 5/10/17: 5' 6\" (1.676 m).    Weight as of this encounter: 204 lb (92.5 kg).  Medication Reconciliation: complete   Mackenzie Appiah      "

## 2017-06-01 NOTE — PROGRESS NOTES
SUBJECTIVE:                                                    Trini Ramirez is a 52 year old female who presents to clinic today for the following health issues:      RESPIRATORY SYMPTOMS      Duration: 2months    Description  cough    Severity: severe    Accompanying signs and symptoms: None    History (predisposing factors):  none    Precipitating or alleviating factors: None    Therapies tried and outcome:  deosum       Patient seen a few times for bronchitis - most recent UC visit on 2017 - chest xray negative at that time.    Given prescription for doxycycline x 10 days, prednisone x 5 days and rescue inhaler (albuterol)    Patient then seen for follow up in office 17 and given another round of oral prednisone.    Patient still using rescue inhaler every 4 hours - maybe helps a little.    Most recent round of prednisone helped for a few days but then symptoms returned.      Problem list and histories reviewed & adjusted, as indicated.  Additional history: as documented    Patient Active Problem List   Diagnosis     Major depressive disorder, single episode in full remission (H)     CARDIOVASCULAR SCREENING; LDL GOAL LESS THAN 160     Seasonal allergic rhinitis     Metatarsalgia of right foot     Hypertension goal BP (blood pressure) < 140/90     Other closed extra-articular fracture of distal end of left radius, initial encounter     Past Surgical History:   Procedure Laterality Date     AS TMJ ARTHROSCOPY/SURGERY        SECTION       OPEN REDUCTION INTERNAL FIXATION WRIST Left 2016    Procedure: OPEN REDUCTION INTERNAL FIXATION WRIST;  Surgeon: Fritz Reid MD;  Location:  OR       Social History   Substance Use Topics     Smoking status: Never Smoker     Smokeless tobacco: Never Used     Alcohol use 0.0 oz/week     0 Standard drinks or equivalent per week      Comment: occ     Family History   Problem Relation Age of Onset     Depression Mother      Hypertension  Father      Depression Father      Substance Abuse Father      Depression Brother      DIABETES Maternal Grandfather      Substance Abuse Brother          Current Outpatient Prescriptions   Medication Sig Dispense Refill     mometasone-formoterol (DULERA) 100-5 MCG/ACT oral inhaler Inhale 2 puffs into the lungs 2 times daily 13 g 1     guaiFENesin-codeine (ROBITUSSIN AC) 100-10 MG/5ML SOLN solution Take 5 mLs by mouth every 4 hours as needed for cough 120 mL 0     lisinopril (PRINIVIL/ZESTRIL) 20 MG tablet Take 1 tablet (20 mg) by mouth daily 90 tablet 0     albuterol (PROAIR HFA/PROVENTIL HFA/VENTOLIN HFA) 108 (90 BASE) MCG/ACT Inhaler Inhale 2 puffs into the lungs every 4 hours as needed 1 Inhaler 0     traZODone (DESYREL) 50 MG tablet TAKE 1 TABLET(50 MG) BY MOUTH EVERY NIGHT AS NEEDED 90 tablet 0     sertraline (ZOLOFT) 100 MG tablet Take 2 tablets (200 mg) by mouth daily 180 tablet 1     fluticasone (FLONASE) 50 MCG/ACT nasal spray Spray 1-2 sprays into both nostrils daily 1 Package 6     Allergies   Allergen Reactions     Seasonal Allergies      BP Readings from Last 3 Encounters:   06/01/17 (!) 162/109   05/18/17 (!) 178/102   05/12/17 (!) 203/119    Wt Readings from Last 3 Encounters:   06/01/17 204 lb (92.5 kg)   05/18/17 207 lb (93.9 kg)   05/12/17 200 lb (90.7 kg)                    Reviewed and updated as needed this visit by clinical staff  Tobacco  Allergies  Meds  Problems  Med Hx  Surg Hx  Fam Hx  Soc Hx        Reviewed and updated as needed this visit by Provider  Allergies  Meds  Problems         ROS:  Constitutional, HEENT, cardiovascular, pulmonary, gi and gu systems are negative, except as otherwise noted.    OBJECTIVE:                                                    BP (!) 162/109  Pulse 106  Temp 97.4  F (36.3  C) (Tympanic)  Wt 204 lb (92.5 kg)  SpO2 98%  BMI 32.93 kg/m2  Body mass index is 32.93 kg/(m^2).  GENERAL: healthy, alert and no distress  EYES: Eyes grossly normal  to inspection, PERRL and conjunctivae and sclerae normal  HENT: ear canals and TM's normal, nose and mouth without ulcers or lesions  NECK: no adenopathy, no asymmetry, masses, or scars and thyroid normal to palpation  RESP: lungs clear to auscultation - no rales, rhonchi, but increased tightness throughout with slight wheeze at end of forced expiratation  CV: regular rate and rhythm, normal S1 S2, no S3 or S4, no murmur, click or rub, no peripheral edema and peripheral pulses strong  PSYCH: mentation appears normal, affect normal/bright    Diagnostic Test Results:  none      ASSESSMENT/PLAN:                                                        ICD-10-CM    1. Acute bronchitis, unspecified organism J20.9 XR Chest 2 Views     mometasone-formoterol (DULERA) 100-5 MCG/ACT oral inhaler     guaiFENesin-codeine (ROBITUSSIN AC) 100-10 MG/5ML SOLN solution   2. Cough R05 XR Chest 2 Views     mometasone-formoterol (DULERA) 100-5 MCG/ACT oral inhaler     guaiFENesin-codeine (ROBITUSSIN AC) 100-10 MG/5ML SOLN solution   3. Bronchospastic airway disease J98.09    4. Hypertension goal BP (blood pressure) < 140/90 I10        Patient Instructions   Continue with Lisinopril to 20 mg daily for the next few weeks then follow up with MA visit.  Consider increase to Lisinopril 40 mg if no improvement in Bp reading at that time.     Encouraged mucinex/guafenisin, warm salt water gargles, cepacol spray, soothers/lozenges, sinus rinses (neilmed), vitamin c, fluids and rest.  May alternate tylenol and NSAIDS (ibuprofen, advil, aleve type products) every 4-6 hours for the next few days as needed.      Repeat chest xray today and consider oral antibiotic pending this.    Trial of daily inhaled steroid (pending insurance coverage) x the next few weeks. Rinse mouth after inhaler use.  Consider another repeat round of oral prednisone if no improvement with above.  Continue with rescue inhaler (albuterol) - 2 puffs every 4-6 hours as  needed.    Return to clinic with any worsening or changes in symptoms.       Lorrie Macias PA-C  [facility]

## 2017-06-02 DIAGNOSIS — F33.0 MILD RECURRENT MAJOR DEPRESSION (H): ICD-10-CM

## 2017-06-02 NOTE — TELEPHONE ENCOUNTER
Medication Detail      Disp Refills Start End TARI   sertraline (ZOLOFT) 100 MG tablet 180 tablet 1 1/2/2017  No   Sig: Take 2 tablets (200 mg) by mouth daily       Last Office Visit with [facility] primary care provider:  6/1/17        Last PHQ-9 score on record=   PHQ-9 SCORE 5/10/2017   Total Score MyChart -   Total Score 2

## 2017-06-05 RX ORDER — SERTRALINE HYDROCHLORIDE 100 MG/1
TABLET, FILM COATED ORAL
Qty: 180 TABLET | Refills: 0 | Status: SHIPPED | OUTPATIENT
Start: 2017-06-05 | End: 2017-08-31

## 2017-06-21 ENCOUNTER — TELEPHONE (OUTPATIENT)
Dept: FAMILY MEDICINE | Facility: CLINIC | Age: 52
End: 2017-06-21

## 2017-06-21 NOTE — TELEPHONE ENCOUNTER
6/21/2017    Call Regarding Preventive Health Screening Colonoscopy    Attempt 1    Message on voicemail     Comments:         Outreach   COURTNEY

## 2017-06-27 DIAGNOSIS — G47.00 INSOMNIA, UNSPECIFIED TYPE: ICD-10-CM

## 2017-06-28 RX ORDER — TRAZODONE HYDROCHLORIDE 50 MG/1
TABLET, FILM COATED ORAL
Qty: 90 TABLET | Refills: 2 | Status: SHIPPED | OUTPATIENT
Start: 2017-06-28 | End: 2018-01-12

## 2017-06-28 NOTE — TELEPHONE ENCOUNTER
Last OV : 6/1/17.    Prescription approved per [facility] Refill Protocol.  Ynes Maurer RN  .

## 2017-07-11 ENCOUNTER — OFFICE VISIT (OUTPATIENT)
Dept: FAMILY MEDICINE | Facility: CLINIC | Age: 52
End: 2017-07-11
Payer: COMMERCIAL

## 2017-07-11 ENCOUNTER — MYC MEDICAL ADVICE (OUTPATIENT)
Dept: FAMILY MEDICINE | Facility: CLINIC | Age: 52
End: 2017-07-11

## 2017-07-11 VITALS
DIASTOLIC BLOOD PRESSURE: 104 MMHG | HEART RATE: 99 BPM | BODY MASS INDEX: 32.85 KG/M2 | WEIGHT: 203.5 LBS | OXYGEN SATURATION: 98 % | SYSTOLIC BLOOD PRESSURE: 150 MMHG | RESPIRATION RATE: 20 BRPM | TEMPERATURE: 98.8 F

## 2017-07-11 DIAGNOSIS — J20.9 ACUTE BRONCHITIS, UNSPECIFIED ORGANISM: ICD-10-CM

## 2017-07-11 DIAGNOSIS — I10 HYPERTENSION GOAL BP (BLOOD PRESSURE) < 140/90: ICD-10-CM

## 2017-07-11 DIAGNOSIS — R05.9 COUGH: Primary | ICD-10-CM

## 2017-07-11 DIAGNOSIS — R05.9 COUGH: ICD-10-CM

## 2017-07-11 PROCEDURE — 99214 OFFICE O/P EST MOD 30 MIN: CPT | Performed by: PHYSICIAN ASSISTANT

## 2017-07-11 RX ORDER — CODEINE PHOSPHATE AND GUAIFENESIN 10; 100 MG/5ML; MG/5ML
1 SOLUTION ORAL EVERY 4 HOURS PRN
Qty: 120 ML | Refills: 0 | Status: SHIPPED | OUTPATIENT
Start: 2017-07-11 | End: 2018-01-12

## 2017-07-11 RX ORDER — MONTELUKAST SODIUM 10 MG/1
10 TABLET ORAL AT BEDTIME
Qty: 90 TABLET | Refills: 1 | Status: SHIPPED | OUTPATIENT
Start: 2017-07-11 | End: 2020-03-18

## 2017-07-11 RX ORDER — LOSARTAN POTASSIUM 100 MG/1
100 TABLET ORAL DAILY
Qty: 30 TABLET | Refills: 1 | Status: SHIPPED | OUTPATIENT
Start: 2017-07-11 | End: 2017-08-31

## 2017-07-11 NOTE — TELEPHONE ENCOUNTER
Checked with provider.  Salvatore states she handed it to patient with AVS and patient put it in her purse.  Patient informed.  Will hand carry to pharmacy.  Rajni Samson RN

## 2017-07-11 NOTE — MR AVS SNAPSHOT
After Visit Summary   7/11/2017    Trini Ramirez    MRN: [id number]           Patient Information     Date Of Birth          1965        Visit Information        Provider Department      7/11/2017 1:20 PM Lorrie Macias PA-C [facility]        Today's Diagnoses     Cough    -  1    Acute bronchitis, unspecified organism        Hypertension goal BP (blood pressure) < 140/90          Care Instructions    Referral for allergy/asthma specialists updated today.  Continue with adviar and flonase daily.  Refill cough medicine for bedtime.  Switch linisopril to losartan for BP.    Return to clinic with any worsening or changes in symptoms and follow up for routine care.     Call Central Scheduling [phone] to schedule mammograms and colonoscopies.            Follow-ups after your visit        Additional Services     ALLERGY/ASTHMA ADULT REFERRAL       Your provider has referred you to: [facility] - [phone]  [email or web address]  FHN: [facility] [place] [phone]   [email or web address]  [facility] [place] [phone]   [email or web address]  [facility]: [place] [facility] [phone]   [email or web address]  [place] [phone]   [email or web address]  [facility] and Ast[facility] [phone]   [email or web address]    Please be aware that coverage of these services is subject to the terms and limitations of your health insurance plan.  Call member services at your health plan with any benefit or coverage questions.      Please bring the following with you to your appointment:    (1) Any X-Rays, CTs or MRIs which have been performed.  Contact the facility where they were done to arrange for  prior to your scheduled appointment.    (2)  List of current medications  (3) This referral request   (4) Any documents/labs given to you for this referral                  Who to contact     If you have questions or need follow up information about today's clinic visit or your schedule please contact [facility] directly at [phone].  Normal or non-critical lab and imaging results will be communicated to you by MyChart, letter or phone within 4 business days after the clinic has received the results. If you do not hear from us within 7 days, please contact the clinic through [employer] or phone. If you have a critical or abnormal lab result, we will notify you by phone as soon as possible.  Submit refill requests through [employer] or call your pharmacy and they will forward the refill request to us. Please allow 3 business days for your refill to be completed.          Additional Information About Your Visit        MyChart Information     [employer] gives you secure access to your electronic health record. If you see a primary care provider, you can also send messages to your care team and make appointments. If you have questions, please call your primary care clinic.  If you do not have a primary care provider, please call [phone] and they will assist you.        Care EveryWhere ID     This is your Care EveryWhere ID. This could be used by other organizations to access your [place] medical records  [id number]        Your Vitals Were     Pulse Temperature Respirations Pulse Oximetry BMI (Body Mass Index)       99 98.8  F (37.1  C) (Oral) 20 98% 32.85 kg/m2        Blood Pressure from Last 3 Encounters:   07/11/17 (!) 150/104   06/01/17 (!) 162/109   05/18/17 (!) 178/102    Weight from Last 3 Encounters:   07/11/17 203 lb 8 oz (92.3 kg)   06/01/17 204 lb (92.5 kg)   05/18/17 207 lb (93.9 kg)              We Performed the Following     ALLERGY/ASTHMA ADULT REFERRAL          Where to get your medicines      Some of these will need a paper  prescription and others can be bought over the counter.  Ask your nurse if you have questions.     Bring a paper prescription for each of these medications     guaiFENesin-codeine 100-10 MG/5ML Soln solution          Primary Care Provider Office Phone # Fax #    Lorrie Macias PA-C [phone]       [facility]       [address]            [facility] [address]        Equal Access to Services     DANIELLE MARIN : Hadii aad ku hadasho Soomaali, waaxda luqadaha, qaybta kaalmada adeegyada, waxay idiin hayaan adeeg kharash la'lorena . So [facility] [phone].    ATENCIÓN: Si habla español, tiene a pierce disposición servicios gratuitos de asistencia lingüística. Sanjiv al [phone].    We comply with applicable federal civil rights laws and [place] laws. We do not discriminate on the basis of race, color, national origin, age, disability sex, sexual orientation or gender identity.            Thank you!     Thank you for choosing [facility]  for your care. Our goal is always to provide you with excellent care. Hearing back from our patients is one way we can continue to improve our services. Please take a few minutes to complete the written survey that you may receive in the mail after your visit with us. Thank you!             Your Updated Medication List - Protect others around you: Learn how to safely use, store and throw away your medicines at www.disposemymeds.org.          This list is accurate as of: 7/11/17  1:33 PM.  Always use your most recent med list.                   Brand Name Dispense Instructions for use Diagnosis    albuterol 108 (90 BASE) MCG/ACT Inhaler    PROAIR HFA/PROVENTIL HFA/VENTOLIN HFA    1 Inhaler    Inhale 2 puffs into the lungs every 4 hours as needed    Acute bronchitis, unspecified organism       fluticasone 50 MCG/ACT spray    FLONASE    1 Package    Spray 1-2 sprays into both nostrils daily    ETD (eustachian tube dysfunction)        fluticasone-salmeterol 100-50 MCG/DOSE diskus inhaler    ADVAIR    1 Inhaler    Inhale 1 puff into the lungs 2 times daily    Bronchospastic airway disease, Cough, Acute bronchitis, unspecified organism       guaiFENesin-codeine 100-10 MG/5ML Soln solution    ROBITUSSIN AC    120 mL    Take 5 mLs by mouth every 4 hours as needed for cough    Cough, Acute bronchitis, unspecified organism       lisinopril 20 MG tablet    PRINIVIL/ZESTRIL    90 tablet    Take 1 tablet (20 mg) by mouth daily    Cough       sertraline 100 MG tablet    ZOLOFT    180 tablet    TAKE 2 TABLETS(200 MG) BY MOUTH DAILY    Mild recurrent major depression (H)       traZODone 50 MG tablet    DESYREL    90 tablet    TAKE 1 TABLET(50 MG) BY MOUTH EVERY NIGHT AS NEEDED    Insomnia, unspecified type

## 2017-07-11 NOTE — PATIENT INSTRUCTIONS
Referral for allergy/asthma specialists updated today.  Continue with adviar and flonase daily.  Trial of addition of Sinuglair nightly as well - for possible allergy/asthma component.  Refill cough medicine for bedtime.  Switch linisopril to losartan for BP.    Return to clinic with any worsening or changes in symptoms and follow up for routine care.     Call Central Scheduling [phone] to schedule mammograms and colonoscopies.

## 2017-07-11 NOTE — NURSING NOTE
"Chief Complaint   Patient presents with     Cough       Initial BP (!) 150/104 (BP Location: Left arm, Patient Position: Sitting, Cuff Size: Adult Regular)  Pulse 99  Temp 98.8  F (37.1  C) (Oral)  Resp 20  Wt 203 lb 8 oz (92.3 kg)  SpO2 98%  BMI 32.85 kg/m2 Estimated body mass index is 32.85 kg/(m^2) as calculated from the following:    Height as of 5/10/17: 5' 6\" (1.676 m).    Weight as of this encounter: 203 lb 8 oz (92.3 kg).  Medication Reconciliation: complete     Gus Acevedo CMA  "

## 2017-07-11 NOTE — PROGRESS NOTES
SUBJECTIVE:                                                    Trini Ramirez is a 52 year old female who presents to clinic today for the following health issues:      RESPIRATORY SYMPTOMS      Duration: 2+months    Description  cough    Severity:moderate to  severe    Accompanying signs and symptoms: None    History (predisposing factors):  none    Precipitating or alleviating factors: None    Therapies tried and outcome:  Deosum, Flonase and robitussin Ac-helps       Patient seen a few times for bronchitis - most recent UC visit on 5/12/2017 - chest xray negative at that time.    Given prescription for doxycycline x 10 days, prednisone x 5 days and rescue inhaler (albuterol)    Patient then seen for follow up in office 5/18/17 and given another round of oral prednisone.    Patient still using rescue inhaler every 4 hours - maybe helps a little.    Most recent round of prednisone helped for a few days but then symptoms returned.    Most recently tried month of inhaled steroid - advair with improvement for about 1-2 weeks.    Patient went out of town with return of allergy like symptoms and especially in lungs.    Patient still using advair and flonase though, but ready for further referral options if possible.    Hypertension Follow-up      Outpatient blood pressures are not being checked.    Low Salt Diet: low salt    Taking Lisinopril 20 mg daily for a few months.       Problem list and histories reviewed & adjusted, as indicated.  Additional history: as documented    Patient Active Problem List   Diagnosis     Major depressive disorder, single episode in full remission (H)     CARDIOVASCULAR SCREENING; LDL GOAL LESS THAN 160     Seasonal allergic rhinitis     Metatarsalgia of right foot     Hypertension goal BP (blood pressure) < 140/90     Other closed extra-articular fracture of distal end of left radius, initial encounter     Past Surgical History:   Procedure Laterality Date     AS TMJ ARTHROSCOPY/SURGERY   1987      SECTION       OPEN REDUCTION INTERNAL FIXATION WRIST Left 2016    Procedure: OPEN REDUCTION INTERNAL FIXATION WRIST;  Surgeon: Fritz Reid MD;  Location: [facility] OR       Social History   Substance Use Topics     Smoking status: Never Smoker     Smokeless tobacco: Never Used     Alcohol use 0.0 oz/week      Comment: occ     Family History   Problem Relation Age of Onset     Depression Mother      Hypertension Father      Depression Father      Substance Abuse Father      Depression Brother      DIABETES Maternal Grandfather      Substance Abuse Brother      Hypertension Brother          Current Outpatient Prescriptions   Medication Sig Dispense Refill     guaiFENesin-codeine (ROBITUSSIN AC) 100-10 MG/5ML SOLN solution Take 5 mLs by mouth every 4 hours as needed for cough 120 mL 0     losartan (COZAAR) 100 MG tablet Take 1 tablet (100 mg) by mouth daily 30 tablet 1     montelukast (SINGULAIR) 10 MG tablet Take 1 tablet (10 mg) by mouth At Bedtime 90 tablet 1     traZODone (DESYREL) 50 MG tablet TAKE 1 TABLET(50 MG) BY MOUTH EVERY NIGHT AS NEEDED 90 tablet 2     sertraline (ZOLOFT) 100 MG tablet TAKE 2 TABLETS(200 MG) BY MOUTH DAILY 180 tablet 0     fluticasone-salmeterol (ADVAIR) 100-50 MCG/DOSE diskus inhaler Inhale 1 puff into the lungs 2 times daily 1 Inhaler 1     albuterol (PROAIR HFA/PROVENTIL HFA/VENTOLIN HFA) 108 (90 BASE) MCG/ACT Inhaler Inhale 2 puffs into the lungs every 4 hours as needed 1 Inhaler 0     fluticasone (FLONASE) 50 MCG/ACT nasal spray Spray 1-2 sprays into both nostrils daily 1 Package 6     lisinopril (PRINIVIL/ZESTRIL) 20 MG tablet Take 1 tablet (20 mg) by mouth daily 90 tablet 0     Allergies   Allergen Reactions     Seasonal Allergies      BP Readings from Last 3 Encounters:   17 (!) 150/104   17 (!) 162/109   17 (!) 178/102    Wt Readings from Last 3 Encounters:   17 203 lb 8 oz (92.3 kg)   17 204 lb (92.5 kg)   17 207 lb  (93.9 kg)                    Reviewed and updated as needed this visit by clinical staff  Tobacco  Allergies  Meds  Problems  Med Hx  Surg Hx  Fam Hx  Soc Hx        Reviewed and updated as needed this visit by Provider  Allergies  Meds  Problems         ROS:  Constitutional, HEENT, cardiovascular, pulmonary, gi and gu systems are negative, except as otherwise noted.    OBJECTIVE:                                                    BP (!) 150/104 (BP Location: Left arm, Patient Position: Sitting, Cuff Size: Adult Regular)  Pulse 99  Temp 98.8  F (37.1  C) (Oral)  Resp 20  Wt 203 lb 8 oz (92.3 kg)  SpO2 98%  BMI 32.85 kg/m2  Body mass index is 32.85 kg/(m^2).  GENERAL: healthy, alert and no distress  EYES: Eyes grossly normal to inspection, PERRL and conjunctivae and sclerae normal  HENT: ear canals and TM's normal, nose and mouth without ulcers or lesions  NECK: no adenopathy, no asymmetry, masses, or scars and thyroid normal to palpation  RESP: lungs clear to auscultation - no rales, rhonchi, but increased tightness throughout with slight wheeze at end of forced expiratation  CV: regular rate and rhythm, normal S1 S2, no S3 or S4, no murmur, click or rub, no peripheral edema and peripheral pulses strong  PSYCH: mentation appears normal, affect normal/bright    Diagnostic Test Results:  none      ASSESSMENT/PLAN:                                                        ICD-10-CM    1. Cough R05 Continue with cough medicine at bedtime, refill printed and handed to patient. Continue with flonase and advair daily as well. Referral updated for ALLERGY/ASTHMA ADULT REFERRAL     guaiFENesin-codeine (ROBITUSSIN AC) 100-10 MG/5ML SOLN solution   2. Acute bronchitis, unspecified organism J20.9 See above. No need for oral antibiotic at this time, consider most likely allergy related. Trial of addition of Singulair nightly as well.  guaiFENesin-codeine (ROBITUSSIN AC) 100-10 MG/5ML SOLN solution   3. Hypertension  goal BP (blood pressure) < 140/90 I10 Long standing, chronic, uncontrolled-  Still not well controlled - switch lisinopril 20 mg to losartan (COZAAR) 100 MG tablet       Patient Instructions   Referral for allergy/asthma specialists updated today.  Continue with adviar and flonase daily.  Trial of addition of Sinuglair nightly as well - for possible allergy/asthma component.  Refill cough medicine for bedtime.  Switch linisopril to losartan for BP.    Return to clinic with any worsening or changes in symptoms and follow up for routine care.     Call Central Scheduling [phone] to schedule mammograms and colonoscopies.        Lorrie Macias PA-C  [facility]

## 2017-07-12 ENCOUNTER — MYC MEDICAL ADVICE (OUTPATIENT)
Dept: FAMILY MEDICINE | Facility: CLINIC | Age: 52
End: 2017-07-12

## 2017-07-12 DIAGNOSIS — J20.8 ACUTE BRONCHITIS DUE TO OTHER SPECIFIED ORGANISMS: Primary | ICD-10-CM

## 2017-07-12 NOTE — TELEPHONE ENCOUNTER
Medication Detail      Disp Refills Start End TARI   guaiFENesin-codeine (ROBITUSSIN AC) 100-10 MG/5ML SOLN solution 120 mL 0 7/11/2017  No   Sig: Take 5 mLs by mouth every 4 hours as needed for cough       Last Office Visit with [facility], [facility] or [facility] prescribing provider: 7/11/17    Medication Detail      Disp Refills Start End TARI   losartan (COZAAR) 100 MG tablet 30 tablet 1 7/11/2017  --   Sig: Take 1 tablet (100 mg) by mouth daily       Last Office Visit with [facility], [facility] or [facility] prescribing provider: 7/11/17       Potassium   Date Value Ref Range Status   02/12/2016 3.8 3.4 - 5.3 mmol/L Final     Creatinine   Date Value Ref Range Status   02/12/2016 0.69 0.52 - 1.04 mg/dL Final     BP Readings from Last 3 Encounters:   07/11/17 (!) 150/104   06/01/17 (!) 162/109   05/18/17 (!) 178/102

## 2017-07-12 NOTE — TELEPHONE ENCOUNTER
Salvatore or POD,  --Patient was seen in at [facility] yesterday 7/11/17.  --Please see her MyChart msg.  --Does patient need to come back in for another office visit to discuss possible antibiotic?    Ynes Maurer RN

## 2017-07-12 NOTE — TELEPHONE ENCOUNTER
Sent mychart as per below triaging symptoms and providing home care - clarified pharmacy of choice    Karel Howard RN

## 2017-07-13 RX ORDER — CODEINE PHOSPHATE AND GUAIFENESIN 10; 100 MG/5ML; MG/5ML
LIQUID ORAL
Qty: 120 ML | Refills: 0 | OUTPATIENT
Start: 2017-07-13

## 2017-07-13 RX ORDER — LOSARTAN POTASSIUM 100 MG/1
TABLET ORAL
Qty: 90 TABLET | Refills: 1 | OUTPATIENT
Start: 2017-07-13

## 2017-07-13 RX ORDER — AZITHROMYCIN 250 MG/1
TABLET, FILM COATED ORAL
Qty: 6 TABLET | Refills: 0 | Status: SHIPPED | OUTPATIENT
Start: 2017-07-13 | End: 2018-01-12

## 2017-07-13 NOTE — TELEPHONE ENCOUNTER
I see in routing that request came in on 7/11/17 and Salvatore gave Scripts on 7/11/17 so these are  duplicate.    Ynes Maurer RN

## 2017-07-20 ENCOUNTER — TELEPHONE (OUTPATIENT)
Dept: FAMILY MEDICINE | Facility: CLINIC | Age: 52
End: 2017-07-20

## 2017-07-20 NOTE — TELEPHONE ENCOUNTER
LVM to schedule MA appointment to have her BP re-checked / started new blood pressure medication.  Sometime within the next 2 weeks.  Please schedule    Thanks,  Omaira Patterson

## 2017-07-21 ENCOUNTER — TRANSFERRED RECORDS (OUTPATIENT)
Dept: HEALTH INFORMATION MANAGEMENT | Facility: CLINIC | Age: 52
End: 2017-07-21

## 2017-07-28 NOTE — TELEPHONE ENCOUNTER
7/28/2017    Call Regarding Preventive Health Screening Colonoscopy    Attempt 2    Message on voicemail     Comments:       Outreach   lacho

## 2017-08-10 ENCOUNTER — HOSPITAL ENCOUNTER (OUTPATIENT)
Dept: MAMMOGRAPHY | Facility: CLINIC | Age: 52
Discharge: HOME | End: 2017-08-10
Attending: PHYSICIAN ASSISTANT | Admitting: PHYSICIAN ASSISTANT
Payer: COMMERCIAL

## 2017-08-10 DIAGNOSIS — Z00.00 PREVENTATIVE HEALTH CARE: ICD-10-CM

## 2017-08-10 PROCEDURE — G0202 SCR MAMMO BI INCL CAD: HCPCS

## 2017-08-10 PROCEDURE — 77063 BREAST TOMOSYNTHESIS BI: CPT

## 2017-08-22 DIAGNOSIS — R05.9 COUGH: ICD-10-CM

## 2017-08-22 RX ORDER — LISINOPRIL 20 MG/1
TABLET ORAL
Qty: 90 TABLET | Refills: 1 | Status: SHIPPED | OUTPATIENT
Start: 2017-08-22 | End: 2018-01-12

## 2017-08-22 NOTE — TELEPHONE ENCOUNTER
lisinopril (PRINIVIL/ZESTRIL) 20 MG tablet 90 tablet 0 5/18/2017  No   Sig: Take 1 tablet (20 mg) by mouth daily       Last Office Visit with [facility], [facility] or [facility] prescribing provider: 7/11/17       Potassium   Date Value Ref Range Status   02/12/2016 3.8 3.4 - 5.3 mmol/L Final     Creatinine   Date Value Ref Range Status   02/12/2016 0.69 0.52 - 1.04 mg/dL Final     BP Readings from Last 3 Encounters:   07/11/17 (!) 150/104   06/01/17 (!) 162/109   05/18/17 (!) 178/102

## 2017-08-31 DIAGNOSIS — F33.0 MILD RECURRENT MAJOR DEPRESSION (H): ICD-10-CM

## 2017-08-31 DIAGNOSIS — I10 HYPERTENSION GOAL BP (BLOOD PRESSURE) < 140/90: ICD-10-CM

## 2017-08-31 NOTE — TELEPHONE ENCOUNTER
sertraline (ZOLOFT) 100 MG tablet     Last Written Prescription Date: 6/5/17  Last Fill Quantity: 180, # refills: 0  [facility] Office Visit with [facility] [place] care provider:  7/11/17        Last PHQ-9 score on record=   PHQ-9 SCORE 5/10/2017   Total Score -   Total Score MyChart -   Total Score 2             losartan (COZAAR) 100 MG tablet      Last Written Prescription Date: 7/11/17  Last Fill Quantity: 30, # refills: 1  Last Office Visit with [facility], [facility] or [facility] prescribing provider: 7/11/17       Potassium   Date Value Ref Range Status   02/12/2016 3.8 3.4 - 5.3 mmol/L Final     Creatinine   Date Value Ref Range Status   02/12/2016 0.69 0.52 - 1.04 mg/dL Final     BP Readings from Last 3 Encounters:   07/11/17 (!) 150/104   06/01/17 (!) 162/109   05/18/17 (!) 178/102

## 2017-09-05 RX ORDER — SERTRALINE HYDROCHLORIDE 100 MG/1
TABLET, FILM COATED ORAL
Qty: 180 TABLET | Refills: 0 | Status: SHIPPED | OUTPATIENT
Start: 2017-09-05 | End: 2017-12-13

## 2017-09-05 RX ORDER — LOSARTAN POTASSIUM 100 MG/1
TABLET ORAL
Qty: 30 TABLET | Refills: 0 | Status: SHIPPED | OUTPATIENT
Start: 2017-09-05 | End: 2017-10-10

## 2017-10-10 DIAGNOSIS — I10 HYPERTENSION GOAL BP (BLOOD PRESSURE) < 140/90: ICD-10-CM

## 2017-10-10 NOTE — TELEPHONE ENCOUNTER
Medication Detail      Disp Refills Start End TARI   losartan (COZAAR) 100 MG tablet 30 tablet 0 9/5/2017  No   Sig: TAKE 1 TABLET(100 MG) BY MOUTH DAILY       Last Office Visit with [facility], [facility] or [facility] prescribing provider: 7/11/17       Potassium   Date Value Ref Range Status   02/12/2016 3.8 3.4 - 5.3 mmol/L Final     Creatinine   Date Value Ref Range Status   02/12/2016 0.69 0.52 - 1.04 mg/dL Final     BP Readings from Last 3 Encounters:   07/11/17 (!) 150/104   06/01/17 (!) 162/109   05/18/17 (!) 178/102

## 2017-10-11 NOTE — TELEPHONE ENCOUNTER
Routing refill request to provider for review/approval because:  --Labs out of range:  K, Scr   --Labs not current:  K, Scr  --Blood pressure not at goal.  --On 8/21/17 patient read reminder that she was due for blood pressure check with MA.  --Patient has not scheduled.    --  --Please call patient (did not respond to MyChart reminder)  and ask her to schedule a blood pressure f/u appointment with [facility].  A refill request for below medication  was sent to the provider.     Thank you,  Ynes Maurer RN

## 2017-10-12 RX ORDER — LOSARTAN POTASSIUM 100 MG/1
TABLET ORAL
Qty: 30 TABLET | Refills: 0 | Status: SHIPPED | OUTPATIENT
Start: 2017-10-12 | End: 2017-11-17

## 2017-11-17 DIAGNOSIS — I10 HYPERTENSION GOAL BP (BLOOD PRESSURE) < 140/90: ICD-10-CM

## 2017-11-17 NOTE — TELEPHONE ENCOUNTER
Medication Detail      Disp Refills Start End TARI   losartan (COZAAR) 100 MG tablet 30 tablet 0 10/12/2017  No   Sig: TAKE 1 TABLET(100 MG) BY MOUTH DAILY   Class: E-Prescribe   Notes to Pharmacy: Medication is being filled for ONE MONTH only due to:  PLEASE ASK PATIENT TO SCHEDULE A BLOOD PRESSURE FOLLOW UP APPOINTMENT WITH ELIUD PERRY.   Order: [id number]   E-Prescribing Status: Receipt confirmed by pharmacy (10/12/2017  7:54 AM CDT)     LOV: 7/11/2017

## 2017-11-20 NOTE — TELEPHONE ENCOUNTER
BP above range.  Needs monitoring labs  Patient already received 1 month supply to allow time to get in for appointment  1 month pended.Rajni Samson RN    Requested Prescriptions   Pending Prescriptions Disp Refills     losartan (COZAAR) 100 MG tablet [Pharmacy Med Name: LOSARTAN 100MG TABLETS] 30 tablet 0     Sig: TAKE 1 TABLET BY MOUTH DAILY    Angiotensin-II Receptors Failed    11/17/2017  4:33 PM       Failed - Blood pressure under 140/90 in past 12 months.    BP Readings from Last 3 Encounters:   07/11/17 (!) 150/104   06/01/17 (!) 162/109   05/18/17 (!) 178/102                Failed - Normal serum creatinine on file in past 12 months    Recent Labs   Lab Test  02/12/16   0808   CR  0.69            Failed - Normal serum potassium on file in past 12 months    Recent Labs   Lab Test  02/12/16   0808   POTASSIUM  3.8                   Passed - Recent or future visit with authorizing provider's specialty    Patient had office visit in the last year or has a visit in the next 30 days with authorizing provider.  See chart review.              Passed - Patient is age 18 or older       Passed - No active pregnancy on record       Passed - No positive pregnancy test in past 12 months      '

## 2017-11-21 RX ORDER — LOSARTAN POTASSIUM 100 MG/1
TABLET ORAL
Qty: 30 TABLET | Refills: 0 | Status: SHIPPED | OUTPATIENT
Start: 2017-11-21 | End: 2017-12-19

## 2017-12-13 DIAGNOSIS — F33.0 MILD RECURRENT MAJOR DEPRESSION (H): ICD-10-CM

## 2017-12-14 NOTE — TELEPHONE ENCOUNTER
Medication Detail         Disp Refills Start End TARI     sertraline (ZOLOFT) 100 MG tablet 90 tablet 0 12/13/2017  No     Sig: TAKE 2 TABLETS(200 MG) BY MOUTH DAILY

## 2017-12-18 RX ORDER — SERTRALINE HYDROCHLORIDE 100 MG/1
TABLET, FILM COATED ORAL
Qty: 180 TABLET | Refills: 0 | OUTPATIENT
Start: 2017-12-18

## 2017-12-19 DIAGNOSIS — I10 HYPERTENSION GOAL BP (BLOOD PRESSURE) < 140/90: ICD-10-CM

## 2017-12-22 RX ORDER — LOSARTAN POTASSIUM 100 MG/1
TABLET ORAL
Qty: 30 TABLET | Refills: 0 | Status: SHIPPED | OUTPATIENT
Start: 2017-12-22 | End: 2018-01-12

## 2018-01-12 ENCOUNTER — OFFICE VISIT (OUTPATIENT)
Dept: FAMILY MEDICINE | Facility: CLINIC | Age: 53
End: 2018-01-12
Payer: COMMERCIAL

## 2018-01-12 VITALS
WEIGHT: 204.75 LBS | OXYGEN SATURATION: 99 % | DIASTOLIC BLOOD PRESSURE: 112 MMHG | RESPIRATION RATE: 17 BRPM | BODY MASS INDEX: 34.11 KG/M2 | HEART RATE: 80 BPM | HEIGHT: 65 IN | SYSTOLIC BLOOD PRESSURE: 161 MMHG | TEMPERATURE: 97.9 F

## 2018-01-12 DIAGNOSIS — I10 HYPERTENSION GOAL BP (BLOOD PRESSURE) < 140/90: Primary | ICD-10-CM

## 2018-01-12 DIAGNOSIS — G47.00 INSOMNIA, UNSPECIFIED TYPE: ICD-10-CM

## 2018-01-12 DIAGNOSIS — F33.0 MILD RECURRENT MAJOR DEPRESSION (H): ICD-10-CM

## 2018-01-12 DIAGNOSIS — J98.09 BRONCHOSPASTIC AIRWAY DISEASE: ICD-10-CM

## 2018-01-12 PROCEDURE — 99214 OFFICE O/P EST MOD 30 MIN: CPT | Performed by: PHYSICIAN ASSISTANT

## 2018-01-12 RX ORDER — TRAZODONE HYDROCHLORIDE 50 MG/1
TABLET, FILM COATED ORAL
Qty: 90 TABLET | Refills: 2 | Status: SHIPPED | OUTPATIENT
Start: 2018-01-12 | End: 2018-09-25

## 2018-01-12 RX ORDER — SERTRALINE HYDROCHLORIDE 100 MG/1
200 TABLET, FILM COATED ORAL DAILY
Qty: 180 TABLET | Refills: 2 | Status: SHIPPED | OUTPATIENT
Start: 2018-01-12 | End: 2018-10-21

## 2018-01-12 RX ORDER — LOSARTAN POTASSIUM 100 MG/1
100 TABLET ORAL DAILY
Qty: 90 TABLET | Refills: 2 | Status: SHIPPED | OUTPATIENT
Start: 2018-01-12 | End: 2018-10-22

## 2018-01-12 ASSESSMENT — ANXIETY QUESTIONNAIRES
6. BECOMING EASILY ANNOYED OR IRRITABLE: SEVERAL DAYS
1. FEELING NERVOUS, ANXIOUS, OR ON EDGE: NEARLY EVERY DAY
IF YOU CHECKED OFF ANY PROBLEMS ON THIS QUESTIONNAIRE, HOW DIFFICULT HAVE THESE PROBLEMS MADE IT FOR YOU TO DO YOUR WORK, TAKE CARE OF THINGS AT HOME, OR GET ALONG WITH OTHER PEOPLE: SOMEWHAT DIFFICULT
3. WORRYING TOO MUCH ABOUT DIFFERENT THINGS: NEARLY EVERY DAY
GAD7 TOTAL SCORE: 12
2. NOT BEING ABLE TO STOP OR CONTROL WORRYING: NEARLY EVERY DAY
7. FEELING AFRAID AS IF SOMETHING AWFUL MIGHT HAPPEN: SEVERAL DAYS
5. BEING SO RESTLESS THAT IT IS HARD TO SIT STILL: NOT AT ALL

## 2018-01-12 ASSESSMENT — PATIENT HEALTH QUESTIONNAIRE - PHQ9
5. POOR APPETITE OR OVEREATING: SEVERAL DAYS
SUM OF ALL RESPONSES TO PHQ QUESTIONS 1-9: 9

## 2018-01-12 NOTE — LETTER
My Depression Action Plan  Name: Trini Ramirez   Date of Birth 1965  Date: 1/12/2018    My doctor: Lorrie Macias   My clinic: [address] [address] [phone]          GREEN    ZONE   Good Control    What it looks like:     Things are going generally well. You have normal up s and down s. You may even feel depressed from time to time, but bad moods usually last less than a day.   What you need to do:  1. Continue to care for yourself (see self care plan)  2. Check your depression survival kit and update it as needed  3. Follow your physician s recommendations including any medication.  4. Do not stop taking medication unless you consult with your physician first.           YELLOW         ZONE Getting Worse    What it looks like:     Depression is starting to interfere with your life.     It may be hard to get out of bed; you may be starting to isolate yourself from others.    Symptoms of depression are starting to last most all day and this has happened for several days.     You may have suicidal thoughts but they are not constant.   What you need to do:     1. Call your care team, your response to treatment will improve if you keep your care team informed of your progress. Yellow periods are signs an adjustment may need to be made.     2. Continue your self-care, even if you have to fake it!    3. Talk to someone in your support network    4. Open up your depression survival kit           RED    ZONE Medical Alert - Get Help    What it looks like:     Depression is seriously interfering with your life.     You may experience these or other symptoms: You can t get out of bed most days, can t work or engage in other necessary activities, you have trouble taking care of basic hygiene, or basic responsibilities, thoughts of suicide or death that will not go away, self-injurious behavior.     What you need to do:  1. Call your care  team and request a same-day appointment. If they are not available (weekends or after hours) call your local crisis line, emergency room or 911.      Electronically signed by: Lorrie Macias, January 12, 2018    Depression Self Care Plan / Survival Kit    Self-Care for Depression  Here s the deal. Your body and mind are really not as separate as most people think.  What you do and think affects how you feel and how you feel influences what you do and think. This means if you do things that people who feel good do, it will help you feel better.  Sometimes this is all it takes.  There is also a place for medication and therapy depending on how severe your depression is, so be sure to consult with your medical provider and/ or Behavioral Health Consultant if your symptoms are worsening or not improving.     In order to better manage my stress, I will:    Exercise  Get some form of exercise, every day. This will help reduce pain and release endorphins, the  feel good  chemicals in your brain. This is almost as good as taking antidepressants!  This is not the same as joining a gym and then never going! (they count on that by the way ) It can be as simple as just going for a walk or doing some gardening, anything that will get you moving.      Hygiene   Maintain good hygiene (Get out of bed in the morning, Make your bed, Brush your teeth, Take a shower, and Get dressed like you were going to work, even if you are unemployed).  If your clothes don't fit try to get ones that do.    Diet  I will strive to eat foods that are good for me, drink plenty of water, and avoid excessive sugar, caffeine, alcohol, and other mood-altering substances.  Some foods that are helpful in depression are: complex carbohydrates, B vitamins, flaxseed, fish or fish oil, fresh fruits and vegetables.    Psychotherapy  I agree to participate in Individual Therapy (if recommended).    Medication  If prescribed medications, I agree to take  them.  Missing doses can result in serious side effects.  I understand that drinking alcohol, or other illicit drug use, may cause potential side effects.  I will not stop my medication abruptly without first discussing it with my provider.    Staying Connected With Others  I will stay in touch with my friends, family members, and my primary care provider/team.    Use your imagination  Be creative.  We all have a creative side; it doesn t matter if it s oil painting, sand castles, or mud pies! This will also kick up the endorphins.    Witness Beauty  (AKA stop and smell the roses) Take a look outside, even in mid-winter. Notice colors, textures. Watch the squirrels and birds.     Service to others  Be of service to others.  There is always someone else in need.  By helping others we can  get out of ourselves  and remember the really important things.  This also provides opportunities for practicing all the other parts of the program.    Humor  Laugh and be silly!  Adjust your TV habits for less news and crime-drama and more comedy.    Control your stress  Try breathing deep, massage therapy, biofeedback, and meditation. Find time to relax each day.     My support system    Clinic Contact:  Phone number:    Contact 1:  Phone number:    Contact 2:  Phone number:    Religion/:  Phone number:    Therapist:  Phone number:    Local crisis center:    Phone number:    Other community support:  Phone number:

## 2018-01-12 NOTE — MR AVS SNAPSHOT
"              After Visit Summary   1/12/2018    Trini Ramirez    MRN: [id number]           Patient Information     Date Of Birth          1965        Visit Information        Provider Department      1/12/2018 4:00 PM Lorrie Macias PA-C [facility]        Today's Diagnoses     Hypertension goal BP (blood pressure) < 140/90    -  1    Mild recurrent major depression (H)        Bronchospastic airway disease        Insomnia, unspecified type          Care Instructions    Refills for Losartan 100 mg daily sent to pharmacy BUT please make a lab only appointment prior to returning to clinic for fasting bloodwork (nothing to eat or drink x 6-8 hours) - labs ordered today.   Return to clinic in 2 weeks for nurse only appointment for BP check as well.  Discussed the pathophysiology of anxiety/depression episodes and the various symptoms seen associated with anxiety episodes. Discussed possible triggers including fatigue, depression, stress, and chemicals such as alcohol, caffeine and certain drugs. Discussed the treatment including an aerobic exercise program, adequate rest, and both rescue meds and maintenance meds.   For your anxiety:   1. Consider therapy - CBT - cognitive behavioral therapy - Ruy Cabral's card given to patient.  2. \"The Chemistry of Calm\" by Amadou Blue   3. \"Hope and Help for your Nerves\" by Leelee Lezama   4. Vitamin D 1362-2795 IU daily   5. Valerian root extract for relaxation and sleep OR Melatonin at bedtime.  Continue with Zoloft 200 mg daily and trazodone 50 mg nightly.  Trial of Buspar 10 mg nightly for the first 1-2 weeks then as needed if needed/tolerate for increased anxiety - max dose 30 mg two times a day.   Patient to return to clinic in 6 months for further refills or sooner with any worsening or changes in symptoms.    Call Central Scheduling [phone] to schedule your colonoscopy.            Follow-ups after your visit        Follow-up notes from " "your care team     Return in about 6 months (around 7/12/2018), or if symptoms worsen or fail to improve.      Future tests that were ordered for you today     Open Future Orders        Priority Expected Expires Ordered    Comprehensive metabolic panel Routine  1/12/2019 1/12/2018            Who to contact     If you have questions or need follow up information about today's clinic visit or your schedule please contact [facility] directly at [phone].  Normal or non-critical lab and imaging results will be communicated to you by [employer], letter or phone within 4 business days after the clinic has received the results. If you do not hear from us within 7 days, please contact the clinic through [employer] or phone. If you have a critical or abnormal lab result, we will notify you by phone as soon as possible.  Submit refill requests through [employer] or call your pharmacy and they will forward the refill request to us. Please allow 3 business days for your refill to be completed.          Additional Information About Your Visit        [employer] Information     [employer] gives you secure access to your electronic health record. If you see a primary care provider, you can also send messages to your care team and make appointments. If you have questions, please call your primary care clinic.  If you do not have a primary care provider, please call [phone] and they will assist you.        Care EveryWhere ID     This is your Care EveryWhere ID. This could be used by other organizations to access your [place] medical records  [id number]        Your Vitals Were     Pulse Temperature Respirations Height Pulse Oximetry BMI (Body Mass Index)    80 97.9  F (36.6  C) (Oral) 17 5' 4.5\" (1.638 m) 99% 34.6 kg/m2       Blood Pressure from Last 3 Encounters:   01/12/18 (!) 161/112   07/11/17 (!) 150/104   06/01/17 (!) 162/109    Weight from Last 3 Encounters:   01/12/18 204 lb 12 oz (92.9 kg)   07/11/17 203 lb 8 oz " (92.3 kg)   06/01/17 204 lb (92.5 kg)              We Performed the Following     DEPRESSION ACTION PLAN (DAP)          Today's Medication Changes          These changes are accurate as of: 1/12/18  4:02 PM.  If you have any questions, ask your nurse or doctor.               These medicines have changed or have updated prescriptions.        Dose/Directions    losartan 100 MG tablet   Commonly known as:  COZAAR   This may have changed:  See the new instructions.   Used for:  Hypertension goal BP (blood pressure) < 140/90   Changed by:  Lorrie Macias PA-C        Dose:  100 mg   Take 1 tablet (100 mg) by mouth daily   Quantity:  90 tablet   Refills:  2       sertraline 100 MG tablet   Commonly known as:  ZOLOFT   This may have changed:    - how much to take  - how to take this  - when to take this  - additional instructions   Used for:  Mild recurrent major depression (H)   Changed by:  Lorrie Macias PA-C        Dose:  200 mg   Take 2 tablets (200 mg) by mouth daily   Quantity:  180 tablet   Refills:  2       traZODone 50 MG tablet   Commonly known as:  DESYREL   This may have changed:  See the new instructions.   Used for:  Insomnia, unspecified type   Changed by:  Lorrie Macias PA-C        TAKE 1 TABLET(50 MG) BY MOUTH EVERY NIGHT AS NEEDED   Quantity:  90 tablet   Refills:  2         Stop taking these medicines if you haven't already. Please contact your care team if you have questions.     azithromycin 250 MG tablet   Commonly known as:  ZITHROMAX   Stopped by:  Lorrie Macias PA-C           guaiFENesin-codeine 100-10 MG/5ML Soln solution   Commonly known as:  ROBITUSSIN AC   Stopped by:  Lorrie Macias PA-C           lisinopril 20 MG tablet   Commonly known as:  PRINIVIL/ZESTRIL   Stopped by:  Lorrie Macias PA-C                Where to get your medicines      These medications were sent to [facility] [address] [place], [place] - [address] [facility] [address] [facility] [address]  [address], [facility] [address]     Phone:  [phone]     fluticasone-salmeterol 100-50 MCG/DOSE diskus inhaler    losartan 100 MG tablet    sertraline 100 MG tablet    traZODone 50 MG tablet                Primary Care Provider Office Phone # Fax #    Lorrieemiliano Macias PA-C [phone]       [address]  [facility] [address]        Equal Access to Services     [facility]: Hadii aad ku hadasho Soomaali, waaxda luqadaha, qaybta kaalmada adeegyada, waxay dillonin haynatn adedavidson cano . So [facility] [phone].    ATENCIÓN: Si habla español, tiene a pierce disposición servicios gratuitos de asistencia lingüística. [facility] [phone].    We comply with applicable federal civil rights laws and [place] laws. We do not discriminate on the basis of race, color, national origin, age, disability, sex, sexual orientation, or gender identity.            Thank you!     Thank you for choosing [facility]  for your care. Our goal is always to provide you with excellent care. Hearing back from our patients is one way we can continue to improve our services. Please take a few minutes to complete the written survey that you may receive in the mail after your visit with us. Thank you!             Your Updated Medication List - Protect others around you: Learn how to safely use, store and throw away your medicines at www.disposemymeds.org.          This list is accurate as of: 1/12/18  4:02 PM.  Always use your most recent med list.                   Brand Name Dispense Instructions for use Diagnosis    albuterol 108 (90 BASE) MCG/ACT Inhaler    PROAIR HFA/PROVENTIL HFA/VENTOLIN HFA    1 Inhaler    Inhale 2 puffs into the lungs every 4 hours as needed    Acute bronchitis, unspecified organism       fluticasone 50 MCG/ACT spray    FLONASE    1 Package    Spray 1-2 sprays into both nostrils daily    ETD (eustachian tube  dysfunction)       fluticasone-salmeterol 100-50 MCG/DOSE diskus inhaler    ADVAIR    1 Inhaler    Inhale 1 puff into the lungs 2 times daily    Bronchospastic airway disease       losartan 100 MG tablet    COZAAR    90 tablet    Take 1 tablet (100 mg) by mouth daily    Hypertension goal BP (blood pressure) < 140/90       montelukast 10 MG tablet    SINGULAIR    90 tablet    Take 1 tablet (10 mg) by mouth At Bedtime    Cough       sertraline 100 MG tablet    ZOLOFT    180 tablet    Take 2 tablets (200 mg) by mouth daily    Mild recurrent major depression (H)       traZODone 50 MG tablet    DESYREL    90 tablet    TAKE 1 TABLET(50 MG) BY MOUTH EVERY NIGHT AS NEEDED    Insomnia, unspecified type

## 2018-01-12 NOTE — PATIENT INSTRUCTIONS
"Refills for Losartan 100 mg daily sent to pharmacy BUT please make a lab only appointment prior to returning to clinic for fasting bloodwork (nothing to eat or drink x 6-8 hours) - labs ordered today.   Return to clinic in 2 weeks for nurse only appointment for BP check as well.  Discussed the pathophysiology of anxiety/depression episodes and the various symptoms seen associated with anxiety episodes. Discussed possible triggers including fatigue, depression, stress, and chemicals such as alcohol, caffeine and certain drugs. Discussed the treatment including an aerobic exercise program, adequate rest, and both rescue meds and maintenance meds.   For your anxiety:   1. Consider therapy - CBT - cognitive behavioral therapy - Ruy Cabral's card given to patient.  2. \"The Chemistry of Calm\" by Amadou Blue   3. \"Hope and Help for your Nerves\" by Leelee Lezama   4. Vitamin D 2195-5399 IU daily   5. Valerian root extract for relaxation and sleep OR Melatonin at bedtime.  Continue with Zoloft 200 mg daily and trazodone 50 mg nightly.  Trial of Buspar 10 mg nightly for the first 1-2 weeks then as needed if needed/tolerate for increased anxiety - max dose 30 mg two times a day.   Patient to return to clinic in 6 months for further refills or sooner with any worsening or changes in symptoms.    Call Central Scheduling [phone] to schedule your colonoscopy.    "

## 2018-01-12 NOTE — PROGRESS NOTES
SUBJECTIVE:   Trini Ramirez is a 52 year old female who presents to clinic today for the following health issues:      Hypertension Follow-up      Outpatient blood pressures are not being checked.    Low Salt Diet: no added salt    Has not had daily bp medicine for the past few weeks, ran out.    Super stressed with work too and knows she needs to work on diet/exercise to decrease weight.    Restarted weight watchers recently.    Depression Followup    Status since last visit: Stable     See PHQ-9 for current symptoms.  Other associated symptoms: None    Complicating factors:   Significant life event:  No   Current substance abuse:  None  Anxiety or Panic symptoms:  Yes-  Anxiety     PHQ-9 Score and MyChart F/U Questions 2016 5/10/2017 2018   Total Score 2 2 9   Q9: Suicide Ideation Not at all Not at all Not at all       PHQ-9  English  PHQ-9   Any Language  Suicide Assessment Five-step Evaluation and Treatment (SAFE-T)        Amount of exercise or physical activity: None    Problems taking medications regularly: No    Medication side effects: none    Diet: regular (no restrictions)    Taking zoloft 200 mg daily with trazodone 50 mg nightly with good results but still feels a lot of anxiety and stress related to work.    Wondering about other options for anxiety especially.          Problem list and histories reviewed & adjusted, as indicated.  Additional history: as documented    Patient Active Problem List   Diagnosis     Major depressive disorder, single episode in full remission (H)     CARDIOVASCULAR SCREENING; LDL GOAL LESS THAN 160     Seasonal allergic rhinitis     Metatarsalgia of right foot     Hypertension goal BP (blood pressure) < 140/90     Other closed extra-articular fracture of distal end of left radius, initial encounter     Past Surgical History:   Procedure Laterality Date     AS TMJ ARTHROSCOPY/SURGERY        SECTION       OPEN REDUCTION INTERNAL FIXATION WRIST Left  2/16/2016    Procedure: OPEN REDUCTION INTERNAL FIXATION WRIST;  Surgeon: Fritz Reid MD;  Location: [facility] OR       Social History   Substance Use Topics     Smoking status: Never Smoker     Smokeless tobacco: Never Used     Alcohol use 0.0 oz/week      Comment: occ     Family History   Problem Relation Age of Onset     Depression Mother      Hypertension Father      Depression Father      Substance Abuse Father      Depression Brother      DIABETES Maternal Grandfather      Substance Abuse Brother      Hypertension Brother      Hypertension Brother      Depression Brother      Substance Abuse Brother          Current Outpatient Prescriptions   Medication Sig Dispense Refill     losartan (COZAAR) 100 MG tablet Take 1 tablet (100 mg) by mouth daily 90 tablet 2     traZODone (DESYREL) 50 MG tablet TAKE 1 TABLET(50 MG) BY MOUTH EVERY NIGHT AS NEEDED 90 tablet 2     sertraline (ZOLOFT) 100 MG tablet Take 2 tablets (200 mg) by mouth daily 180 tablet 2     fluticasone-salmeterol (ADVAIR) 100-50 MCG/DOSE diskus inhaler Inhale 1 puff into the lungs 2 times daily 1 Inhaler 3     montelukast (SINGULAIR) 10 MG tablet Take 1 tablet (10 mg) by mouth At Bedtime 90 tablet 1     albuterol (PROAIR HFA/PROVENTIL HFA/VENTOLIN HFA) 108 (90 BASE) MCG/ACT Inhaler Inhale 2 puffs into the lungs every 4 hours as needed 1 Inhaler 0     fluticasone (FLONASE) 50 MCG/ACT nasal spray Spray 1-2 sprays into both nostrils daily 1 Package 6     [DISCONTINUED] losartan (COZAAR) 100 MG tablet TAKE 1 TABLET BY MOUTH DAILY 30 tablet 0     [DISCONTINUED] sertraline (ZOLOFT) 100 MG tablet TAKE 2 TABLETS(200 MG) BY MOUTH DAILY 90 tablet 0     [DISCONTINUED] traZODone (DESYREL) 50 MG tablet TAKE 1 TABLET(50 MG) BY MOUTH EVERY NIGHT AS NEEDED 90 tablet 2     [DISCONTINUED] fluticasone-salmeterol (ADVAIR) 100-50 MCG/DOSE diskus inhaler Inhale 1 puff into the lungs 2 times daily 1 Inhaler 1     Allergies   Allergen Reactions     Seasonal Allergies      BP  "Readings from Last 3 Encounters:   01/12/18 (!) 161/112   07/11/17 (!) 150/104   06/01/17 (!) 162/109    Wt Readings from Last 3 Encounters:   01/12/18 204 lb 12 oz (92.9 kg)   07/11/17 203 lb 8 oz (92.3 kg)   06/01/17 204 lb (92.5 kg)            Reviewed and updated as needed this visit by clinical staffTobacco  Allergies  Meds  Problems  Med Hx  Surg Hx  Fam Hx  Soc Hx        Reviewed and updated as needed this visit by Provider  Allergies  Meds  Problems         ROS:  Constitutional, HEENT, cardiovascular, pulmonary, gi and gu systems are negative, except as otherwise noted.      OBJECTIVE:   BP (!) 161/112 (BP Location: Left arm, Patient Position: Sitting, Cuff Size: Adult Large)  Pulse 80  Temp 97.9  F (36.6  C) (Oral)  Resp 17  Ht 5' 4.5\" (1.638 m)  Wt 204 lb 12 oz (92.9 kg)  SpO2 99%  BMI 34.6 kg/m2  Body mass index is 34.6 kg/(m^2).  GENERAL: healthy, alert and no distress  RESP: lungs clear to auscultation - no rales, rhonchi or wheezes  CV: regular rate and rhythm, normal S1 S2, no S3 or S4, no murmur, click or rub, no peripheral edema and peripheral pulses strong  MS: no gross musculoskeletal defects noted, no edema  PSYCH: mentation appears normal, affect normal/bright    Diagnostic Test Results:  none     ASSESSMENT/PLAN:     Hypertension; UNcontrolled   Associated with the following complications:    None   Plan:  No changes in the patient's current treatment plan but take medicine daily and work on diet and exercise; return to clinic in for fasting labs as previously ordered and BP check at that time.    Depression; recurrent episode-- Mild   Associated with the following complications:    None   Plan:  Continue previous prescription, refills sent to pharmacy and add buspar as needed per patient instructions.          ICD-10-CM    1. Hypertension goal BP (blood pressure) < 140/90 I10 Comprehensive metabolic panel     losartan (COZAAR) 100 MG tablet   2. Mild recurrent major depression " "(H) F33.0 sertraline (ZOLOFT) 100 MG tablet   3. Insomnia, unspecified type G47.00 traZODone (DESYREL) 50 MG tablet   4. Bronchospastic airway disease J98.09 fluticasone-salmeterol (ADVAIR) 100-50 MCG/DOSE diskus inhaler       Patient Instructions   Refills for Losartan 100 mg daily sent to pharmacy BUT please make a lab only appointment prior to returning to clinic for fasting bloodwork (nothing to eat or drink x 6-8 hours) - labs ordered today.   Return to clinic in 2 weeks for nurse only appointment for BP check as well.  Discussed the pathophysiology of anxiety/depression episodes and the various symptoms seen associated with anxiety episodes. Discussed possible triggers including fatigue, depression, stress, and chemicals such as alcohol, caffeine and certain drugs. Discussed the treatment including an aerobic exercise program, adequate rest, and both rescue meds and maintenance meds.   For your anxiety:   1. Consider therapy - CBT - cognitive behavioral therapy - Ruy Cabral's card given to patient.  2. \"The Chemistry of Calm\" by Amadou Blue   3. \"Hope and Help for your Nerves\" by Leelee Lezama   4. Vitamin D 5293-4495 IU daily   5. Valerian root extract for relaxation and sleep OR Melatonin at bedtime.  Continue with Zoloft 200 mg daily and trazodone 50 mg nightly.  Trial of Buspar 10 mg nightly for the first 1-2 weeks then as needed if needed/tolerate for increased anxiety - max dose 30 mg two times a day.   Patient to return to clinic in 6 months for further refills or sooner with any worsening or changes in symptoms.    Call Central Scheduling [phone] to schedule your colonoscopy.        Lorrie Macias PA-C  [facility]     "

## 2018-01-13 ENCOUNTER — MYC MEDICAL ADVICE (OUTPATIENT)
Dept: FAMILY MEDICINE | Facility: CLINIC | Age: 53
End: 2018-01-13

## 2018-01-13 DIAGNOSIS — F41.9 ANXIETY: Primary | ICD-10-CM

## 2018-01-13 ASSESSMENT — ANXIETY QUESTIONNAIRES: GAD7 TOTAL SCORE: 12

## 2018-01-16 RX ORDER — BUSPIRONE HYDROCHLORIDE 10 MG/1
TABLET ORAL
Qty: 90 TABLET | Refills: 0 | Status: SHIPPED | OUTPATIENT
Start: 2018-01-16 | End: 2018-04-05

## 2018-04-05 DIAGNOSIS — F41.9 ANXIETY: ICD-10-CM

## 2018-04-05 NOTE — TELEPHONE ENCOUNTER
"Requested Prescriptions   Pending Prescriptions Disp Refills     busPIRone (BUSPAR) 10 MG tablet [Pharmacy Med Name: BUSPIRONE 10MG TABLETS]  Last Written Prescription Date:  1/16/2018  Last Fill Quantity: 90 TABLET,  # refills: 0   Last Office Visit: 1/12/2018   Future Office Visit:      90 tablet 0     Sig: TAKE 1 TABLET BY MOUTH EVERY NIGHT AT BEDTIME FOR 1-2 WEEKS, THEN AS NEEDED FOR INCREASED ANXIETY, MAX DOSE 30MG TWICE DAILY    Atypical Antidepressants Protocol Passed    4/5/2018  7:01 AM       Passed - Recent (12 mo) or future (30 days) visit within the authorizing provider's specialty    Patient had office visit in the last 12 months or has a visit in the next 30 days with authorizing provider or within the authorizing provider's specialty.  See \"Patient Info\" tab in inbasket, or \"Choose Columns\" in Meds & Orders section of the refill encounter.           Passed - Patient is age 18 or older       Passed - No active pregnancy on record       Passed - No positive pregnancy test in past 12 mos          "

## 2018-04-06 ENCOUNTER — MYC MEDICAL ADVICE (OUTPATIENT)
Dept: FAMILY MEDICINE | Facility: CLINIC | Age: 53
End: 2018-04-06

## 2018-04-09 RX ORDER — BUSPIRONE HYDROCHLORIDE 10 MG/1
TABLET ORAL
Qty: 90 TABLET | Refills: 0 | Status: SHIPPED | OUTPATIENT
Start: 2018-04-09 | End: 2018-06-14

## 2018-04-25 DIAGNOSIS — J20.9 ACUTE BRONCHITIS, UNSPECIFIED ORGANISM: ICD-10-CM

## 2018-04-26 NOTE — TELEPHONE ENCOUNTER
"Requested Prescriptions   Pending Prescriptions Disp Refills     VENTOLIN  (90 Base) MCG/ACT Inhaler [Pharmacy Med Name: VENTOLIN HFA INH W/DOS CTR [id number]]  Last Written Prescription Date:  5/10/2017  Last Fill Quantity: 1,  # refills: 0   Last Office Visit: 1/12/2018   Future Office Visit:      18 g 0     Sig: INHALE 2 PUFFS INTO THE LUNGS EVERY 4 HOURS AS NEEDED    Asthma Maintenance Inhalers - Anticholinergics Failed    4/25/2018 10:40 PM       Failed - Asthma control assessment score within normal limits in last 6 months    Please review ACT score.   No flowsheet data found.         Passed - Patient is age 12 years or older       Passed - Recent (6 mo) or future (30 days) visit within the authorizing provider's specialty    Patient had office visit in the last 6 months or has a visit in the next 30 days with authorizing provider or within the authorizing provider's specialty.  See \"Patient Info\" tab in inbasket, or \"Choose Columns\" in Meds & Orders section of the refill encounter.            "

## 2018-04-27 RX ORDER — ALBUTEROL SULFATE 90 UG/1
AEROSOL, METERED RESPIRATORY (INHALATION)
Qty: 18 G | Refills: 0 | Status: SHIPPED | OUTPATIENT
Start: 2018-04-27 | End: 2019-03-15

## 2018-04-27 NOTE — TELEPHONE ENCOUNTER
No flowsheet data found.   No ACT. Med is rx for acute bronchitis.  Routing refill to pcp.  SHERIE Enamorado

## 2018-06-14 ENCOUNTER — OFFICE VISIT (OUTPATIENT)
Dept: FAMILY MEDICINE | Facility: CLINIC | Age: 53
End: 2018-06-14
Payer: COMMERCIAL

## 2018-06-14 VITALS
OXYGEN SATURATION: 97 % | TEMPERATURE: 98.2 F | BODY MASS INDEX: 33.8 KG/M2 | WEIGHT: 200 LBS | HEART RATE: 97 BPM | DIASTOLIC BLOOD PRESSURE: 86 MMHG | RESPIRATION RATE: 20 BRPM | SYSTOLIC BLOOD PRESSURE: 138 MMHG

## 2018-06-14 DIAGNOSIS — L02.619 CELLULITIS AND ABSCESS OF FOOT EXCLUDING TOE: Primary | ICD-10-CM

## 2018-06-14 DIAGNOSIS — Z23 NEED FOR VACCINATION: ICD-10-CM

## 2018-06-14 DIAGNOSIS — L03.119 CELLULITIS AND ABSCESS OF FOOT EXCLUDING TOE: Primary | ICD-10-CM

## 2018-06-14 DIAGNOSIS — F41.9 ANXIETY: ICD-10-CM

## 2018-06-14 DIAGNOSIS — I10 HYPERTENSION GOAL BP (BLOOD PRESSURE) < 140/90: ICD-10-CM

## 2018-06-14 PROCEDURE — 90471 IMMUNIZATION ADMIN: CPT | Performed by: PHYSICIAN ASSISTANT

## 2018-06-14 PROCEDURE — 90715 TDAP VACCINE 7 YRS/> IM: CPT | Performed by: PHYSICIAN ASSISTANT

## 2018-06-14 PROCEDURE — 99214 OFFICE O/P EST MOD 30 MIN: CPT | Mod: 25 | Performed by: PHYSICIAN ASSISTANT

## 2018-06-14 RX ORDER — HYDROCHLOROTHIAZIDE 12.5 MG/1
12.5 TABLET ORAL DAILY
Qty: 90 TABLET | Refills: 1 | Status: SHIPPED | OUTPATIENT
Start: 2018-06-14 | End: 2020-02-14

## 2018-06-14 RX ORDER — BUSPIRONE HYDROCHLORIDE 10 MG/1
10 TABLET ORAL 2 TIMES DAILY
Qty: 180 TABLET | Refills: 0 | Status: SHIPPED | OUTPATIENT
Start: 2018-06-14 | End: 2018-10-22

## 2018-06-14 NOTE — MR AVS SNAPSHOT
After Visit Summary   6/14/2018    Trini Ramirez    MRN: [id number]           Patient Information     Date Of Birth          1965        Visit Information        Provider Department      6/14/2018 9:20 AM Lorrie Macias PA-C [facility]        Today's Diagnoses     Cellulitis and abscess of foot excluding toe    -  1    Hypertension goal BP (blood pressure) < 140/90        Anxiety          Care Instructions    Please make a lab only appointment prior to returning to clinic for fasting bloodwork (nothing to eat or drink x 6-8 hours) - labs ordered today.     Add HCTZ (water pill) to previous prescription for losartan for improvement in BP readings.    Continue with Buspar 10 mg as needed.    Recommend trial of a daily probiotic agent; some common options include: Align, Culturelle, Digestive Advantage, Florastor, Activia yogurt, or Kefir.  Choose one agent (or a comparable generic OTC formulation) that is affordable/palatable and use it daily for at least 3-6 months to determine its baseline effect.  Encouraged 20 billion units per day for probiotic dosage (5-10 billion units for children).           Follow-ups after your visit        Who to contact     If you have questions or need follow up information about today's clinic visit or your schedule please contact [facility] directly at [phone].  Normal or non-critical lab and imaging results will be communicated to you by MyChart, letter or phone within 4 business days after the clinic has received the results. If you do not hear from us within 7 days, please contact the clinic through MyChart or phone. If you have a critical or abnormal lab result, we will notify you by phone as soon as possible.  Submit refill requests through e994 or call your pharmacy and they will forward the refill request to us. Please allow 3 business days for your refill to be completed.          Additional Information About  Your Visit        [employer] Information     [employer] gives you secure access to your electronic health record. If you see a primary care provider, you can also send messages to your care team and make appointments. If you have questions, please call your primary care clinic.  If you do not have a primary care provider, please call [phone] and they will assist you.        Care EveryWhere ID     This is your Care EveryWhere ID. This could be used by other organizations to access your [facility] medical records  [id number]        Your Vitals Were     Pulse Temperature Respirations Pulse Oximetry BMI (Body Mass Index)       97 98.2  F (36.8  C) (Oral) 20 97% 33.8 kg/m2        Blood Pressure from Last 3 Encounters:   06/14/18 (!) 167/111   01/12/18 (!) 161/112   07/11/17 (!) 150/104    Weight from Last 3 Encounters:   06/14/18 200 lb (90.7 kg)   01/12/18 204 lb 12 oz (92.9 kg)   07/11/17 203 lb 8 oz (92.3 kg)              Today, you had the following     No orders found for display         Today's Medication Changes          These changes are accurate as of 6/14/18  9:39 AM.  If you have any questions, ask your nurse or doctor.               Start taking these medicines.        Dose/Directions    hydrochlorothiazide 12.5 MG Tabs tablet   Used for:  Hypertension goal BP (blood pressure) < 140/90   Started by:  Lorrie Macias PA-C        Dose:  12.5 mg   Take 1 tablet (12.5 mg) by mouth daily   Quantity:  90 tablet   Refills:  1         These medicines have changed or have updated prescriptions.        Dose/Directions    busPIRone 10 MG tablet   Commonly known as:  BUSPAR   This may have changed:  See the new instructions.   Used for:  Anxiety   Changed by:  Lorrie Macias PA-C        Dose:  10 mg   Take 1 tablet (10 mg) by mouth 2 times daily   Quantity:  180 tablet   Refills:  0            Where to get your medicines      These medications were sent to [facility] [address] [place], [place]  - [address] AT [address]  [address] [place], [place] [address], [facility] [address]    Hours:  24-hours Phone:  [phone]     busPIRone 10 MG tablet    hydrochlorothiazide 12.5 MG Tabs tablet                Primary Care Provider Office Phone # Fax #    Lorrie Balbuena CHEYANNE Macias [phone]       [address]  [facility] [address]        Equal Access to Services     DNAIELLE MARIN : Hadii aad ku hadasho Soomaali, waaxda luqadaha, qaybta kaalmada adeegyada, waxay idiin hayaan adeeg kharash lastanislav . So [facility] [phone].    ATENCIÓN: Si habla español, tiene a pierce disposición servicios gratuitos de asistencia lingüística. Llame al [phone].    We comply with applicable federal civil rights laws and [place] laws. We do not discriminate on the basis of race, color, national origin, age, disability, sex, sexual orientation, or gender identity.            Thank you!     Thank you for choosing [facility]  for your care. Our goal is always to provide you with excellent care. Hearing back from our patients is one way we can continue to improve our services. Please take a few minutes to complete the written survey that you may receive in the mail after your visit with us. Thank you!             Your Updated Medication List - Protect others around you: Learn how to safely use, store and throw away your medicines at www.disposemymeds.org.          This list is accurate as of 6/14/18  9:39 AM.  Always use your most recent med list.                   Brand Name Dispense Instructions for use Diagnosis    busPIRone 10 MG tablet    BUSPAR    180 tablet    Take 1 tablet (10 mg) by mouth 2 times daily    Anxiety       CLINDAMYCIN HCL PO      Take 300 mg by mouth 4 times daily        fluticasone 50 MCG/ACT spray    FLONASE    1 Package    Spray 1-2 sprays into both nostrils daily    ETD (eustachian tube dysfunction)       fluticasone-salmeterol 100-50 MCG/DOSE diskus  inhaler    ADVAIR    1 Inhaler    Inhale 1 puff into the lungs 2 times daily    Bronchospastic airway disease       hydrochlorothiazide 12.5 MG Tabs tablet     90 tablet    Take 1 tablet (12.5 mg) by mouth daily    Hypertension goal BP (blood pressure) < 140/90       losartan 100 MG tablet    COZAAR    90 tablet    Take 1 tablet (100 mg) by mouth daily    Hypertension goal BP (blood pressure) < 140/90       montelukast 10 MG tablet    SINGULAIR    90 tablet    Take 1 tablet (10 mg) by mouth At Bedtime    Cough       sertraline 100 MG tablet    ZOLOFT    180 tablet    Take 2 tablets (200 mg) by mouth daily    Mild recurrent major depression (H)       traZODone 50 MG tablet    DESYREL    90 tablet    TAKE 1 TABLET(50 MG) BY MOUTH EVERY NIGHT AS NEEDED    Insomnia, unspecified type       VENTOLIN  (90 Base) MCG/ACT Inhaler   Generic drug:  albuterol     18 g    INHALE 2 PUFFS INTO THE LUNGS EVERY 4 HOURS AS NEEDED    Acute bronchitis, unspecified organism

## 2018-06-14 NOTE — PATIENT INSTRUCTIONS
Please make a lab only appointment prior to returning to clinic for fasting bloodwork (nothing to eat or drink x 6-8 hours) - labs ordered today.     Add HCTZ (water pill) to previous prescription for losartan for improvement in BP readings.    Continue with Buspar 10 mg as needed.    Recommend trial of a daily probiotic agent; some common options include: Align, Culturelle, Digestive Advantage, Florastor, Activia yogurt, or Kefir.  Choose one agent (or a comparable generic OTC formulation) that is affordable/palatable and use it daily for at least 3-6 months to determine its baseline effect.  Encouraged 20 billion units per day for probiotic dosage (5-10 billion units for children).

## 2018-06-14 NOTE — NURSING NOTE
Screening Questionnaire for Adult Immunization     Are you sick today?   No    Do you have allergies to medications, food or any vaccine?   No    Have you ever had a serious reaction after receiving a vaccination?   No    Do you have a long-term health problem with heart disease, lung disease,  asthma, kidney disease, diabetes, anemia, metabolic or blood disease?   No    Do you have cancer, leukemia, AIDS, or any immune system problem?   No    Do you take cortisone, prednisone, other steroids, or anticancer drugs, or  have you had any x-ray (radiation) treatments?   No    Have you had a seizure, brain, or other nervous system problem?   No    During the past year, have you received a transfusion of blood or blood       products, or been given a medicine called immune (gamma) globulin?   No    For women: Are you pregnant or is there a chance you could become         pregnant during the next month?   No    Have you received any vaccinations in the past 4 weeks?   No     Immunization questionnaire answers were all negative.      MNVFC doesn't apply on this patient    Per orders of Salvatore Macias, injection of Adacel Tdap given by Ella Joshi. Patient instructed to remain in clinic for 20 minutes afterwards, and to report any adverse reaction to me immediately.    Prior to injection verified patient identity using patient's name and date of birth.       Screening performed by Ella Joshi, DEXTER

## 2018-06-14 NOTE — PROGRESS NOTES
SUBJECTIVE:   Trini Ramirez is a 53 year old female who presents to clinic today for the following health issues:    ED/UC Followup:    Facility: In [place]  Date of visit:6/10/18  Reason for visit: Cellulitis  Current Status: Improved, still a little painful  Patient given clindamycin 4 times a day for ~ 10 days.     Hypertension Follow-up      Outpatient blood pressures are being checked at home.  Results are 140/80s. (at  recently as well)    Low Salt Diet: no added salt    Patient taking Losartan 100 mg daily since 2018.       Depression Followup    Status since last visit: Stable     See PHQ-9 for current symptoms.  Other associated symptoms: None    Complicating factors:   Significant life event:  No                     Current substance abuse:  None  Anxiety or Panic symptoms:  Yes-  Anxiety   Taking zoloft 200 mg daily with trazodone 50 mg nightly with good results and using buspar as needed with good results - needs refills.       Problem list and histories reviewed & adjusted, as indicated.  Additional history: as documented    Patient Active Problem List   Diagnosis     Major depressive disorder, single episode in full remission (H)     CARDIOVASCULAR SCREENING; LDL GOAL LESS THAN 160     Seasonal allergic rhinitis     Metatarsalgia of right foot     Hypertension goal BP (blood pressure) < 140/90     Other closed extra-articular fracture of distal end of left radius, initial encounter     Past Surgical History:   Procedure Laterality Date     AS TMJ ARTHROSCOPY/SURGERY        SECTION       OPEN REDUCTION INTERNAL FIXATION WRIST Left 2016    Procedure: OPEN REDUCTION INTERNAL FIXATION WRIST;  Surgeon: Fritz Reid MD;  Location:  OR       Social History   Substance Use Topics     Smoking status: Never Smoker     Smokeless tobacco: Never Used     Alcohol use 0.0 oz/week      Comment: occ     Family History   Problem Relation Age of Onset     Depression Mother       Hypertension Father      Depression Father      Substance Abuse Father      Depression Brother      DIABETES Maternal Grandfather      Substance Abuse Brother      Hypertension Brother      Hypertension Brother      Depression Brother      Substance Abuse Brother          Current Outpatient Prescriptions   Medication Sig Dispense Refill     busPIRone (BUSPAR) 10 MG tablet Take 1 tablet (10 mg) by mouth 2 times daily 180 tablet 0     CLINDAMYCIN HCL PO Take 300 mg by mouth 4 times daily       fluticasone (FLONASE) 50 MCG/ACT nasal spray Spray 1-2 sprays into both nostrils daily 1 Package 6     fluticasone-salmeterol (ADVAIR) 100-50 MCG/DOSE diskus inhaler Inhale 1 puff into the lungs 2 times daily 1 Inhaler 3     hydrochlorothiazide 12.5 MG TABS tablet Take 1 tablet (12.5 mg) by mouth daily 90 tablet 1     losartan (COZAAR) 100 MG tablet Take 1 tablet (100 mg) by mouth daily 90 tablet 2     sertraline (ZOLOFT) 100 MG tablet Take 2 tablets (200 mg) by mouth daily 180 tablet 2     traZODone (DESYREL) 50 MG tablet TAKE 1 TABLET(50 MG) BY MOUTH EVERY NIGHT AS NEEDED 90 tablet 2     VENTOLIN  (90 Base) MCG/ACT Inhaler INHALE 2 PUFFS INTO THE LUNGS EVERY 4 HOURS AS NEEDED 18 g 0     montelukast (SINGULAIR) 10 MG tablet Take 1 tablet (10 mg) by mouth At Bedtime (Patient not taking: Reported on 6/14/2018) 90 tablet 1     Allergies   Allergen Reactions     Seasonal Allergies      BP Readings from Last 3 Encounters:   06/14/18 138/86   01/12/18 (!) 161/112   07/11/17 (!) 150/104    Wt Readings from Last 3 Encounters:   06/14/18 200 lb (90.7 kg)   01/12/18 204 lb 12 oz (92.9 kg)   07/11/17 203 lb 8 oz (92.3 kg)                 Reviewed and updated as needed this visit by clinical staff  Tobacco  Allergies  Meds  Problems  Med Hx  Surg Hx  Fam Hx  Soc Hx        Reviewed and updated as needed this visit by Provider  Allergies  Meds  Problems         ROS:  Constitutional, HEENT, cardiovascular, pulmonary, GI, ,  musculoskeletal, neuro, skin, endocrine and psych systems are negative, except as otherwise noted.    OBJECTIVE:     /86 (BP Location: Left arm, Patient Position: Chair, Cuff Size: Adult Large)  Pulse 97  Temp 98.2  F (36.8  C) (Oral)  Resp 20  Wt 200 lb (90.7 kg)  SpO2 97%  BMI 33.8 kg/m2  Body mass index is 33.8 kg/(m^2).  GENERAL: healthy, alert and no distress  RESP: lungs clear to auscultation - no rales, rhonchi or wheezes  CV: regular rate and rhythm, normal S1 S2, no S3 or S4, no murmur, click or rub, no peripheral edema and peripheral pulses strong  MS: no gross musculoskeletal defects noted, no edema  SKIN: no suspicious lesions or rashes' resolving blood blister on bottom of right fig toe, no signs of erythema, swelling, discharge or superficial infection at this time.  PSYCH: mentation appears normal, affect normal/bright    Diagnostic Test Results:  none     ASSESSMENT/PLAN:     Hypertension; controlled - better                        Associated with the following complications:                                              None                        Plan:  Small addition of HCTZ 12.5 mg daily with previous prescription for Losartan 100 mg daily; take medicine daily and work on diet and exercise; return to clinic in for fasting labs as previously ordered and BP check at that time.    Depression; recurrent episode-- Mild                        Associated with the following complications:                                              None                        Plan:  Continue previous prescription, refills for buspar sent to pharmacy today.      ICD-10-CM    1. Cellulitis and abscess of foot excluding toe L03.119     L02.619    2. Hypertension goal BP (blood pressure) < 140/90 I10 hydrochlorothiazide 12.5 MG TABS tablet   3. Anxiety F41.9 busPIRone (BUSPAR) 10 MG tablet   4. Need for vaccination Z23 TDAP, IM (10 - 64 YRS) - Adacel       Patient Instructions   Please make a lab only appointment  prior to returning to clinic for fasting bloodwork (nothing to eat or drink x 6-8 hours) - labs ordered today.     Add HCTZ (water pill) to previous prescription for losartan for improvement in BP readings.    Continue with Buspar 10 mg as needed.    Recommend trial of a daily probiotic agent; some common options include: Align, Culturelle, Digestive Advantage, Florastor, Activia yogurt, or Kefir.  Choose one agent (or a comparable generic OTC formulation) that is affordable/palatable and use it daily for at least 3-6 months to determine its baseline effect.  Encouraged 20 billion units per day for probiotic dosage (5-10 billion units for children).       Lorrie Macias PA-C  [facility]

## 2018-07-24 DIAGNOSIS — G47.00 INSOMNIA, UNSPECIFIED TYPE: ICD-10-CM

## 2018-07-25 NOTE — TELEPHONE ENCOUNTER
"Requested Prescriptions   Pending Prescriptions Disp Refills     traZODone (DESYREL) 50 MG tablet [Pharmacy Med Name: TRAZODONE 50MG TABLETS]  Last Written Prescription Date:  1-12-18  Last Fill Quantity: 90 tab,  # refills: 2   Last office visit: 6/14/2018 with prescribing provider:  Alyssa Macias   Future Office Visit:    90 tablet 0     Sig: TAKE 1 TABLET(50 MG) BY MOUTH EVERY NIGHT AS NEEDED    Serotonin Modulators Passed    7/24/2018 11:00 PM       Passed - Recent (12 mo) or future (30 days) visit within the authorizing provider's specialty    Patient had office visit in the last 12 months or has a visit in the next 30 days with authorizing provider or within the authorizing provider's specialty.  See \"Patient Info\" tab in inbasket, or \"Choose Columns\" in Meds & Orders section of the refill encounter.           Passed - Patient is age 18 or older       Passed - No active pregnancy on record       Passed - No positive pregnancy test in past 12 months          "

## 2018-07-30 RX ORDER — TRAZODONE HYDROCHLORIDE 50 MG/1
TABLET, FILM COATED ORAL
Qty: 90 TABLET | Refills: 0 | OUTPATIENT
Start: 2018-07-30

## 2018-08-09 NOTE — TELEPHONE ENCOUNTER
Requested Prescriptions   Pending Prescriptions Disp Refills     losartan (COZAAR) 100 MG tablet [Pharmacy Med Name: LOSARTAN 100MG TABLETS]  Last Written Prescription Date:  11/21/2017  Last Fill Quantity: 30 tabs,  # refills: 0   Last Office Visit with [facility], [facility] or [facility] prescribing provider:  7/11/2017     Future Office Visit:    Next 5 appointments (look out 90 days)     Jan 05, 2018  3:40 PM CST   MyChart Long with Lrorie Macias PA-C   [facility] ([facility])    [address] [place] [id number] [phone]                  30 tablet 0     Sig: TAKE 1 TABLET BY MOUTH DAILY    Angiotensin-II Receptors Failed    12/19/2017  3:39 AM       Failed - Blood pressure under 140/90 in past 12 months.    BP Readings from Last 3 Encounters:   07/11/17 (!) 150/104   06/01/17 (!) 162/109   05/18/17 (!) 178/102                Failed - Normal serum creatinine on file in past 12 months    Recent Labs   Lab Test  02/12/16   0808   CR  0.69            Failed - Normal serum potassium on file in past 12 months    Recent Labs   Lab Test  02/12/16   0808   POTASSIUM  3.8                   Passed - Recent or future visit with authorizing provider's specialty    Patient had office visit in the last year or has a visit in the next 30 days with authorizing provider.  See chart review.              Passed - Patient is age 18 or older       Passed - No active pregnancy on record       Passed - No positive pregnancy test in past 12 months           Attending Attestation (For Attendings USE Only)...

## 2018-09-25 ENCOUNTER — MYC REFILL (OUTPATIENT)
Dept: FAMILY MEDICINE | Facility: CLINIC | Age: 53
End: 2018-09-25

## 2018-09-25 DIAGNOSIS — G47.00 INSOMNIA, UNSPECIFIED TYPE: ICD-10-CM

## 2018-09-26 RX ORDER — TRAZODONE HYDROCHLORIDE 50 MG/1
TABLET, FILM COATED ORAL
Qty: 90 TABLET | Refills: 0 | Status: SHIPPED | OUTPATIENT
Start: 2018-09-26 | End: 2019-01-03

## 2018-09-26 NOTE — TELEPHONE ENCOUNTER
Message from [place]:  Original authorizing provider: CHEYANNE Madsen would like a refill of the following medications:  traZODone (DESYREL) 50 MG tablet [Lorrie Macias PA-C]    Preferred pharmacy: [facility] [id number] [place], [place] [address] [address]VE AT [address]    Comment:

## 2018-09-26 NOTE — TELEPHONE ENCOUNTER
"Requested Prescriptions   Pending Prescriptions Disp Refills     traZODone (DESYREL) 50 MG tablet  Last Written Prescription Date:  1/12/2018  Last Fill Quantity: 90 tablet,  # refills: 2   Last Office Visit: 6/14/2018   Future Office Visit:      90 tablet 2     Sig: TAKE 1 TABLET(50 MG) BY MOUTH EVERY NIGHT AS NEEDED    Serotonin Modulators Passed    9/26/2018  6:08 AM       Passed - Recent (12 mo) or future (30 days) visit within the authorizing provider's specialty    Patient had office visit in the last 12 months or has a visit in the next 30 days with authorizing provider or within the authorizing provider's specialty.  See \"Patient Info\" tab in inbasket, or \"Choose Columns\" in Meds & Orders section of the refill encounter.           Passed - Patient is age 18 or older       Passed - No active pregnancy on record       Passed - No positive pregnancy test in past 12 months          "

## 2018-10-02 DIAGNOSIS — G47.00 INSOMNIA, UNSPECIFIED TYPE: ICD-10-CM

## 2018-10-03 NOTE — TELEPHONE ENCOUNTER
"Requested Prescriptions   Pending Prescriptions Disp Refills     traZODone (DESYREL) 50 MG tablet [Pharmacy Med Name: TRAZODONE 50MG TABLETS]  Last Written Prescription Date:  9/26/2018  Last Fill Quantity: 90 TABLET,  # refills: 0   Last Office Visit: 6/14/2018   Future Office Visit:      90 tablet 0     Sig: TAKE 1 TABLET BY MOUTH EVERY NIGHT AS NEEDED    Serotonin Modulators Passed    10/2/2018 10:13 PM       Passed - Recent (12 mo) or future (30 days) visit within the authorizing provider's specialty    Patient had office visit in the last 12 months or has a visit in the next 30 days with authorizing provider or within the authorizing provider's specialty.  See \"Patient Info\" tab in inbasket, or \"Choose Columns\" in Meds & Orders section of the refill encounter.           Passed - Patient is age 18 or older       Passed - No active pregnancy on record       Passed - No positive pregnancy test in past 12 months          "

## 2018-10-04 RX ORDER — TRAZODONE HYDROCHLORIDE 50 MG/1
TABLET, FILM COATED ORAL
Qty: 90 TABLET | Refills: 0 | OUTPATIENT
Start: 2018-10-04

## 2018-10-21 DIAGNOSIS — F33.0 MILD RECURRENT MAJOR DEPRESSION (H): ICD-10-CM

## 2018-10-22 ENCOUNTER — MYC REFILL (OUTPATIENT)
Dept: FAMILY MEDICINE | Facility: CLINIC | Age: 53
End: 2018-10-22

## 2018-10-22 DIAGNOSIS — F33.0 MILD RECURRENT MAJOR DEPRESSION (H): ICD-10-CM

## 2018-10-22 DIAGNOSIS — I10 HYPERTENSION GOAL BP (BLOOD PRESSURE) < 140/90: ICD-10-CM

## 2018-10-22 DIAGNOSIS — F41.9 ANXIETY: ICD-10-CM

## 2018-10-22 RX ORDER — SERTRALINE HYDROCHLORIDE 100 MG/1
200 TABLET, FILM COATED ORAL DAILY
Qty: 180 TABLET | Refills: 2 | Status: CANCELLED | OUTPATIENT
Start: 2018-10-22

## 2018-10-22 NOTE — TELEPHONE ENCOUNTER
"Requested Prescriptions   Pending Prescriptions Disp Refills     sertraline (ZOLOFT) 100 MG tablet [Pharmacy Med Name: SERTRALINE 100MG TABLETS]  Last Written Prescription Date:  1/12/2018  Last Fill Quantity: 180 tablet,  # refills: 2   Last Office Visit: 6/14/2018   Future Office Visit:      180 tablet 0     Sig: TAKE 2 TABLETS(200 MG) BY MOUTH DAILY    SSRIs Protocol Failed    10/21/2018  7:43 AM       Failed - PHQ-9 score less than 5 in past 6 months    Please review last PHQ-9 score.   PHQ-9 SCORE 11/17/2016 5/10/2017 1/12/2018   Total Score - - -   Total Score MyChart - - -   Total Score 2 2 9                Passed - Patient is age 18 or older       Passed - No active pregnancy on record       Passed - No positive pregnancy test in last 12 months       Passed - Recent (6 mo) or future (30 days) visit within the authorizing provider's specialty    Patient had office visit in the last 6 months or has a visit in the next 30 days with authorizing provider or within the authorizing provider's specialty.  See \"Patient Info\" tab in inbasket, or \"Choose Columns\" in Meds & Orders section of the refill encounter.              "

## 2018-10-22 NOTE — TELEPHONE ENCOUNTER
"Requested Prescriptions   Pending Prescriptions Disp Refills     losartan (COZAAR) 100 MG tablet [Pharmacy Med Name: LOSARTAN 100MG TABLETS]  Last Written Prescription Date:  01/12/2018  Last Fill Quantity: 90 tablet,  # refills: 2   Last Office Visit: 6/14/2018   Future Office Visit:      90 tablet 0     Sig: TAKE 1 TABLET(100 MG) BY MOUTH DAILY    Angiotensin-II Receptors Failed    10/22/2018  3:35 AM       Failed - Normal serum creatinine on file in past 12 months    Recent Labs   Lab Test  02/12/16   0808   CR  0.69            Failed - Normal serum potassium on file in past 12 months    Recent Labs   Lab Test  02/12/16   0808   POTASSIUM  3.8                   Passed - Blood pressure under 140/90 in past 12 months    BP Readings from Last 3 Encounters:   06/14/18 138/86   01/12/18 (!) 161/112   07/11/17 (!) 150/104                Passed - Recent (12 mo) or future (30 days) visit within the authorizing provider's specialty    Patient had office visit in the last 12 months or has a visit in the next 30 days with authorizing provider or within the authorizing provider's specialty.  See \"Patient Info\" tab in inbasket, or \"Choose Columns\" in Meds & Orders section of the refill encounter.             Passed - Patient is age 18 or older       Passed - No active pregnancy on record       Passed - No positive pregnancy test in past 12 months          "

## 2018-10-22 NOTE — TELEPHONE ENCOUNTER
Message from [place]:  Original authorizing provider: CHEYANNE Madsen would like a refill of the following medications:  sertraline (ZOLOFT) 100 MG tablet [Lorrie Macias PA-C]  busPIRone (BUSPAR) 10 MG tablet [Lorrie Macias PA-C]    Preferred pharmacy: [facility] [address]    Comment:

## 2018-10-23 ENCOUNTER — MYC MEDICAL ADVICE (OUTPATIENT)
Dept: FAMILY MEDICINE | Facility: CLINIC | Age: 53
End: 2018-10-23

## 2018-10-23 DIAGNOSIS — F33.0 MILD RECURRENT MAJOR DEPRESSION (H): ICD-10-CM

## 2018-10-23 RX ORDER — SERTRALINE HYDROCHLORIDE 100 MG/1
TABLET, FILM COATED ORAL
Qty: 60 TABLET | Refills: 0 | Status: SHIPPED | OUTPATIENT
Start: 2018-10-23 | End: 2018-12-05

## 2018-10-23 RX ORDER — LOSARTAN POTASSIUM 100 MG/1
TABLET ORAL
Qty: 90 TABLET | Refills: 0 | Status: SHIPPED | OUTPATIENT
Start: 2018-10-23 | End: 2019-01-03

## 2018-10-23 NOTE — TELEPHONE ENCOUNTER
"Requested Prescriptions   Pending Prescriptions Disp Refills     sertraline (ZOLOFT) 100 MG tablet [Pharmacy Med Name: SERTRALINE 100MG TABLETS]  Last Written Prescription Date:  10/23/2018  Last Fill Quantity: 60 tablet,  # refills: 0   Last Office Visit: 6/14/2018   Future Office Visit:      180 tablet 0     Sig: TAKE 2 TABLETS BY MOUTH DAILY    SSRIs Protocol Failed    10/23/2018 11:53 AM       Failed - PHQ-9 score less than 5 in past 6 months    Please review last PHQ-9 score.   PHQ-9 SCORE 11/17/2016 5/10/2017 1/12/2018   Total Score - - -   Total Score MyChart - - -   Total Score 2 2 9     RAUL-7 SCORE 1/12/2018   Total Score 12          Passed - Patient is age 18 or older       Passed - No active pregnancy on record       Passed - No positive pregnancy test in last 12 months       Passed - Recent (6 mo) or future (30 days) visit within the authorizing provider's specialty    Patient had office visit in the last 6 months or has a visit in the next 30 days with authorizing provider or within the authorizing provider's specialty.  See \"Patient Info\" tab in inbasket, or \"Choose Columns\" in Meds & Orders section of the refill encounter.              "

## 2018-10-23 NOTE — TELEPHONE ENCOUNTER
Routing refill request to provider for review/approval because:  PHQ-9 > 4 and patient overdue for 6 month depression follow up visit, per review of 1/12/18 office visit note.    One month supply pended.    Salvatore-Please sign if agree.    Team coordinators-Please contact patient to schedule depression follow up appt.    Thank you!  NANCY PerezN, RN

## 2018-10-23 NOTE — TELEPHONE ENCOUNTER
"Requested Prescriptions   Pending Prescriptions Disp Refills     sertraline (ZOLOFT) 100 MG tablet  Last Written Prescription Date:  10/23/2018  Last Fill Quantity: 60 tablet,  # refills: 0   Last Office Visit: 6/14/2018   Future Office Visit:      180 tablet 2     Sig: Take 2 tablets (200 mg) by mouth daily    SSRIs Protocol Failed    10/22/2018  6:39 PM       Failed - PHQ-9 score less than 5 in past 6 months    Please review last PHQ-9 score.   PHQ-9 SCORE 11/17/2016 5/10/2017 1/12/2018   Total Score - - -   Total Score MyChart - - -   Total Score 2 2 9     RAUL-7 SCORE 1/12/2018   Total Score 12          Passed - Patient is age 18 or older       Passed - No active pregnancy on record       Passed - No positive pregnancy test in last 12 months       Passed - Recent (6 mo) or future (30 days) visit within the authorizing provider's specialty    Patient had office visit in the last 6 months or has a visit in the next 30 days with authorizing provider or within the authorizing provider's specialty.  See \"Patient Info\" tab in inbasket, or \"Choose Columns\" in Meds & Orders section of the refill encounter.         _________________________________________________________________________________________________________________________       busPIRone (BUSPAR) 10 MG tablet  Last Written Prescription Date:  6/14/2018  Last Fill Quantity: 180 tablet,  # refills: 0   Last Office Visit: 6/14/2018   Future Office Visit:      180 tablet 0     Sig: Take 1 tablet (10 mg) by mouth 2 times daily    Atypical Antidepressants Protocol Failed    10/22/2018  6:39 PM       Failed - Patient has PHQ-9 score less than 5 in past 6 months.    Please review last PHQ-9 score.   PHQ-9 SCORE 11/17/2016 5/10/2017 1/12/2018   Total Score - - -   Total Score Moon - - -   Total Score 2 2 9     RAUL-7 SCORE 1/12/2018   Total Score 12          Passed - Patient is age 18 or older       Passed - No active pregnancy on record       Passed - No positive " "pregnancy test in past 12 mos       Passed - Recent (6 mo) or future (30 days) visit within the authorizing provider's specialty    Patient had office visit in the last 6 months or has a visit in the next 30 days with authorizing provider or within the authorizing provider's specialty.  See \"Patient Info\" tab in inbasket, or \"Choose Columns\" in Meds & Orders section of the refill encounter.              "

## 2018-10-24 RX ORDER — SERTRALINE HYDROCHLORIDE 100 MG/1
TABLET, FILM COATED ORAL
Qty: 180 TABLET | Refills: 0 | OUTPATIENT
Start: 2018-10-24

## 2018-10-24 RX ORDER — BUSPIRONE HYDROCHLORIDE 10 MG/1
10 TABLET ORAL 2 TIMES DAILY
Qty: 60 TABLET | Refills: 0 | Status: SHIPPED | OUTPATIENT
Start: 2018-10-24 | End: 2019-01-03

## 2018-10-24 NOTE — TELEPHONE ENCOUNTER
One month refill only of Buspar, patient is overdue for depression follow up.  HW Reception to contact patient to schedule that follow up-see 10/21/18 refill encounter.    Zoloft refilled in another encounter-see 10/21/18 refill encounter.    CAROLINE Mars, NANCYN, RN

## 2018-12-05 DIAGNOSIS — F33.0 MILD RECURRENT MAJOR DEPRESSION (H): ICD-10-CM

## 2018-12-05 NOTE — TELEPHONE ENCOUNTER
"Requested Prescriptions   Pending Prescriptions Disp Refills     sertraline (ZOLOFT) 100 MG tablet [Pharmacy Med Name: SERTRALINE 100MG TABLETS]  Last Written Prescription Date:  10/23/2018  Last Fill Quantity: 60 tablet,  # refills: 0   Last Office Visit: 6/14/2018   Future Office Visit:    Next 5 appointments (look out 90 days)     Jan 03, 2019  9:20 AM CST   Office Visit with Lorrie Macias PA-C   [facility] ([facility])    [address] [place] [id number] [phone]                60 tablet 0     Sig: TAKE 2 TABLETS BY MOUTH DAILY    SSRIs Protocol Failed    12/5/2018 12:43 PM       Failed - PHQ-9 score less than 5 in past 6 months    Please review last PHQ-9 score.   PHQ-9 SCORE 11/17/2016 5/10/2017 1/12/2018   PHQ-9 Total Score - - -   PHQ-9 Total Score MyChart - - -   PHQ-9 Total Score 2 2 9     RAUL-7 SCORE 1/12/2018   Total Score 12          Passed - Patient is age 18 or older       Passed - No active pregnancy on record       Passed - No positive pregnancy test in last 12 months       Passed - Recent (6 mo) or future (30 days) visit within the authorizing provider's specialty    Patient had office visit in the last 6 months or has a visit in the next 30 days with authorizing provider or within the authorizing provider's specialty.  See \"Patient Info\" tab in inbasket, or \"Choose Columns\" in Meds & Orders section of the refill encounter.              "

## 2018-12-06 ENCOUNTER — MYC REFILL (OUTPATIENT)
Dept: FAMILY MEDICINE | Facility: CLINIC | Age: 53
End: 2018-12-06

## 2018-12-06 DIAGNOSIS — F33.0 MILD RECURRENT MAJOR DEPRESSION (H): ICD-10-CM

## 2018-12-06 RX ORDER — SERTRALINE HYDROCHLORIDE 100 MG/1
TABLET, FILM COATED ORAL
Qty: 60 TABLET | Refills: 0 | Status: SHIPPED | OUTPATIENT
Start: 2018-12-06 | End: 2019-01-03

## 2018-12-06 RX ORDER — SERTRALINE HYDROCHLORIDE 100 MG/1
TABLET, FILM COATED ORAL
Qty: 60 TABLET | Refills: 0 | Status: CANCELLED | OUTPATIENT
Start: 2018-12-06

## 2018-12-06 NOTE — TELEPHONE ENCOUNTER
Message from [employer]:  Original authorizing provider: CHEYANNE Madsen PINGJose Ramirez would like a refill of the following medications:  sertraline (ZOLOFT) 100 MG tablet [Lorrie Macias PA-C]    Preferred pharmacy: [facility] [address] [place], [place] [address]A AVE AT [address]    Comment:  I scheduled an appointment to update my depression screening, which I know was required to get a refill. I was unable to get a time that worked for me and for Salvatore until [place] [address], but I do have the appointment and will be there. Thank you very much

## 2018-12-06 NOTE — TELEPHONE ENCOUNTER
Patient is scheduled 1/3/19 with Salvatore.  Can she get 1 more month to make it to appointment?  PHQ 9 > 4

## 2018-12-25 DIAGNOSIS — G47.00 INSOMNIA, UNSPECIFIED TYPE: ICD-10-CM

## 2018-12-26 NOTE — TELEPHONE ENCOUNTER
"Requested Prescriptions   Pending Prescriptions Disp Refills     traZODone (DESYREL) 50 MG tablet [Pharmacy Med Name: TRAZODONE 50MG TABLETS]  Last Written Prescription Date:  9/26/2018  Last Fill Quantity: 90 tablet,  # refills: 0   Last Office Visit: 6/14/2018   Future Office Visit:    Next 5 appointments (look out 90 days)    Jan 03, 2019  9:20 AM CST  Office Visit with Lorrie Macias PA-C  [facility] ([facility] [address] [place] [id number] [phone]        90 tablet 0     Sig: TAKE 1 TABLET(50 MG) BY MOUTH EVERY NIGHT AS NEEDED    Serotonin Modulators Passed - 12/25/2018 10:27 PM       Passed - Recent (12 mo) or future (30 days) visit within the authorizing provider's specialty    Patient had office visit in the last 12 months or has a visit in the next 30 days with authorizing provider or within the authorizing provider's specialty.  See \"Patient Info\" tab in inbasket, or \"Choose Columns\" in Meds & Orders section of the refill encounter.           Passed - Patient is age 18 or older       Passed - No active pregnancy on record       Passed - No positive pregnancy test in past 12 months          "

## 2018-12-27 RX ORDER — TRAZODONE HYDROCHLORIDE 50 MG/1
TABLET, FILM COATED ORAL
Qty: 90 TABLET | Refills: 0 | Status: SHIPPED | OUTPATIENT
Start: 2018-12-27 | End: 2019-01-03

## 2018-12-28 NOTE — TELEPHONE ENCOUNTER
Medication is being filled for 1 time refill only due to:  Patient needs to be seen because needs appt.   Has appt scheduled.  Nicky Solis RN

## 2019-01-03 ENCOUNTER — OFFICE VISIT (OUTPATIENT)
Dept: FAMILY MEDICINE | Facility: CLINIC | Age: 54
End: 2019-01-03
Payer: COMMERCIAL

## 2019-01-03 VITALS
HEART RATE: 88 BPM | WEIGHT: 207 LBS | TEMPERATURE: 97.9 F | DIASTOLIC BLOOD PRESSURE: 82 MMHG | SYSTOLIC BLOOD PRESSURE: 136 MMHG | OXYGEN SATURATION: 97 % | BODY MASS INDEX: 34.98 KG/M2 | RESPIRATION RATE: 17 BRPM

## 2019-01-03 DIAGNOSIS — F33.0 MILD RECURRENT MAJOR DEPRESSION (H): ICD-10-CM

## 2019-01-03 DIAGNOSIS — Z13.29 SCREENING FOR THYROID DISORDER: ICD-10-CM

## 2019-01-03 DIAGNOSIS — Z13.6 CARDIOVASCULAR SCREENING; LDL GOAL LESS THAN 160: ICD-10-CM

## 2019-01-03 DIAGNOSIS — G47.00 INSOMNIA, UNSPECIFIED TYPE: ICD-10-CM

## 2019-01-03 DIAGNOSIS — F41.9 ANXIETY: ICD-10-CM

## 2019-01-03 DIAGNOSIS — I10 HYPERTENSION GOAL BP (BLOOD PRESSURE) < 140/90: ICD-10-CM

## 2019-01-03 LAB
ALBUMIN SERPL-MCNC: 4 G/DL (ref 3.4–5)
ALP SERPL-CCNC: 99 U/L (ref 40–150)
ALT SERPL W P-5'-P-CCNC: 37 U/L (ref 0–50)
ANION GAP SERPL CALCULATED.3IONS-SCNC: 9 MMOL/L (ref 3–14)
AST SERPL W P-5'-P-CCNC: 17 U/L (ref 0–45)
BILIRUB SERPL-MCNC: 0.5 MG/DL (ref 0.2–1.3)
BUN SERPL-MCNC: 16 MG/DL (ref 7–30)
CALCIUM SERPL-MCNC: 9.4 MG/DL (ref 8.5–10.1)
CHLORIDE SERPL-SCNC: 106 MMOL/L (ref 94–109)
CHOLEST SERPL-MCNC: 257 MG/DL
CO2 SERPL-SCNC: 24 MMOL/L (ref 20–32)
CREAT SERPL-MCNC: 0.77 MG/DL (ref 0.52–1.04)
GFR SERPL CREATININE-BSD FRML MDRD: 88 ML/MIN/{1.73_M2}
GLUCOSE SERPL-MCNC: 99 MG/DL (ref 70–99)
HDLC SERPL-MCNC: 46 MG/DL
LDLC SERPL CALC-MCNC: 183 MG/DL
NONHDLC SERPL-MCNC: 211 MG/DL
POTASSIUM SERPL-SCNC: 3.9 MMOL/L (ref 3.4–5.3)
PROT SERPL-MCNC: 7.7 G/DL (ref 6.8–8.8)
SODIUM SERPL-SCNC: 139 MMOL/L (ref 133–144)
TRIGL SERPL-MCNC: 139 MG/DL
TSH SERPL DL<=0.005 MIU/L-ACNC: 1.99 MU/L (ref 0.4–4)

## 2019-01-03 PROCEDURE — 99214 OFFICE O/P EST MOD 30 MIN: CPT | Performed by: PHYSICIAN ASSISTANT

## 2019-01-03 PROCEDURE — 80061 LIPID PANEL: CPT | Performed by: PHYSICIAN ASSISTANT

## 2019-01-03 PROCEDURE — 84443 ASSAY THYROID STIM HORMONE: CPT | Performed by: PHYSICIAN ASSISTANT

## 2019-01-03 PROCEDURE — 36415 COLL VENOUS BLD VENIPUNCTURE: CPT | Performed by: PHYSICIAN ASSISTANT

## 2019-01-03 PROCEDURE — 80053 COMPREHEN METABOLIC PANEL: CPT | Performed by: PHYSICIAN ASSISTANT

## 2019-01-03 RX ORDER — LOSARTAN POTASSIUM 100 MG/1
TABLET ORAL
Qty: 90 TABLET | Refills: 3 | Status: SHIPPED | OUTPATIENT
Start: 2019-01-03 | End: 2020-02-14

## 2019-01-03 RX ORDER — SERTRALINE HYDROCHLORIDE 100 MG/1
200 TABLET, FILM COATED ORAL DAILY
Qty: 180 TABLET | Refills: 3 | Status: SHIPPED | OUTPATIENT
Start: 2019-01-03 | End: 2020-01-06

## 2019-01-03 RX ORDER — BUSPIRONE HYDROCHLORIDE 10 MG/1
10 TABLET ORAL 2 TIMES DAILY
Qty: 60 TABLET | Refills: 3 | Status: SHIPPED | OUTPATIENT
Start: 2019-01-03 | End: 2020-03-18

## 2019-01-03 RX ORDER — TRAZODONE HYDROCHLORIDE 50 MG/1
TABLET, FILM COATED ORAL
Qty: 90 TABLET | Refills: 3 | Status: SHIPPED | OUTPATIENT
Start: 2019-01-03 | End: 2020-01-10

## 2019-01-03 ASSESSMENT — ANXIETY QUESTIONNAIRES
IF YOU CHECKED OFF ANY PROBLEMS ON THIS QUESTIONNAIRE, HOW DIFFICULT HAVE THESE PROBLEMS MADE IT FOR YOU TO DO YOUR WORK, TAKE CARE OF THINGS AT HOME, OR GET ALONG WITH OTHER PEOPLE: NOT DIFFICULT AT ALL
6. BECOMING EASILY ANNOYED OR IRRITABLE: NOT AT ALL
7. FEELING AFRAID AS IF SOMETHING AWFUL MIGHT HAPPEN: SEVERAL DAYS
5. BEING SO RESTLESS THAT IT IS HARD TO SIT STILL: NOT AT ALL
3. WORRYING TOO MUCH ABOUT DIFFERENT THINGS: SEVERAL DAYS
GAD7 TOTAL SCORE: 6
2. NOT BEING ABLE TO STOP OR CONTROL WORRYING: MORE THAN HALF THE DAYS
1. FEELING NERVOUS, ANXIOUS, OR ON EDGE: MORE THAN HALF THE DAYS

## 2019-01-03 ASSESSMENT — PATIENT HEALTH QUESTIONNAIRE - PHQ9
5. POOR APPETITE OR OVEREATING: NOT AT ALL
SUM OF ALL RESPONSES TO PHQ QUESTIONS 1-9: 7

## 2019-01-03 NOTE — RESULT ENCOUNTER NOTE
"Sol Feliz  Your attached labs are within normal limits and stable.  Desired or goal levels are:  CHOLESTEROL: Desirable is less than 200.  HDL (Good Cholesterol): Desirable is greater than 40 in men and greater than 50 in women.  LDL (Bad Cholesterol): Desirable is less than 130 or less than 100 in patients with diabetes or vascular disease. For some patients with diabetes or vascular disease, the desireable LDL is less that 70.  TRIGLYCERIDES: Desirable is less than 150.    I recommend that you take Omega-3 fatty acids (available in capsules) to improve your overall levels, especially your triglycerides. You need 2000 - 4000 mg daily of EPA + DHA. This usually means 4 - 8 capsules a day, depending on the strength you buy or you can try taking red yeast rice (an herbal supplement that contains a natural statin) start with 600 mg once daily and increase to 1200 mg twice daily as tolerated.     As you may know, abnormal cholesterol is one factor that increases your risk for heart disease and stroke. You can improve your cholesterol by controlling the amount and type of fat you eat and by increasing your daily activity level.    Here are some ways to improve your nutrition (adapted from the American Academy of [employer] handout):  Eat less fat (especially butter, Crisco and other saturated fats)  Buy lean cuts of meat; reduce your portions of red meat or substitute poultry or fish  Use skim milk and low-fat dairy products  Eat no more than 4 egg yolks per week  Avoid fried or fast foods that are high in fat  Eat more fruits and vegetables    Also consider starting or increasing your aerobic activity; this is the best way to improve HDL (good) cholesterol. If aerobic activity would be new to you, please talk with me first about what activities are safe for you.    Please contact the office with any questions or concerns.    Lorrie Ball \"Salvatore\" CHEYANNE Macias    "

## 2019-01-03 NOTE — PROGRESS NOTES
SUBJECTIVE:   Trini Ramirez is a 52 year old female who presents to clinic today for the following health issues:      Hypertension Follow-up      Outpatient blood pressures are not being checked.    Low Salt Diet: no added salt    Has not had daily bp medicine for the past few weeks, ran out.    Labs not done since , but patient is fasting today.    Patient takes Losartan (cozaar) 100 mg daily.    Depression Followup    Status since last visit: Stable     See PHQ-9 for current symptoms.  Other associated symptoms: None    Complicating factors:   Significant life event:  No   Current substance abuse:  None  Anxiety or Panic symptoms:  Yes-  Anxiety     PHQ 2016 5/10/2017 2018   PHQ-9 Total Score 2 2 9   Q9: Suicide Ideation Not at all Not at all Not at all       PHQ-9  English  PHQ-9   Any Language  Suicide Assessment Five-step Evaluation and Treatment (SAFE-T)        Amount of exercise or physical activity: None    Problems taking medications regularly: No    Medication side effects: none    Diet: regular (no restrictions)    Taking zoloft 200 mg daily with trazodone 50 mg nightly with good results but still feels a lot of anxiety and stress related to work. Previously added Buspar for as needed anxiety with only minimal trial but wants to use it a little more and feels like it's a good option.        Problem list and histories reviewed & adjusted, as indicated.  Additional history: as documented    Patient Active Problem List   Diagnosis     Major depressive disorder, single episode in full remission (H)     CARDIOVASCULAR SCREENING; LDL GOAL LESS THAN 160     Seasonal allergic rhinitis     Metatarsalgia of right foot     Hypertension goal BP (blood pressure) < 140/90     Other closed extra-articular fracture of distal end of left radius, initial encounter     Past Surgical History:   Procedure Laterality Date     AS TMJ ARTHROSCOPY/SURGERY        SECTION       OPEN REDUCTION  INTERNAL FIXATION WRIST Left 2/16/2016    Procedure: OPEN REDUCTION INTERNAL FIXATION WRIST;  Surgeon: Fritz Reid MD;  Location: [facility] OR       Social History     Tobacco Use     Smoking status: Never Smoker     Smokeless tobacco: Never Used   Substance Use Topics     Alcohol use: Yes     Alcohol/week: 0.0 oz     Comment: occ     Family History   Problem Relation Age of Onset     Depression Mother      Hypertension Father      Depression Father      Substance Abuse Father      Depression Brother      Diabetes Maternal Grandfather      Substance Abuse Brother      Hypertension Brother      Hypertension Brother      Depression Brother      Substance Abuse Brother      Mental Illness Brother          Current Outpatient Medications   Medication Sig Dispense Refill     busPIRone (BUSPAR) 10 MG tablet Take 1 tablet (10 mg) by mouth 2 times daily 60 tablet 3     CLINDAMYCIN HCL PO Take 300 mg by mouth 4 times daily       fluticasone (FLONASE) 50 MCG/ACT nasal spray Spray 1-2 sprays into both nostrils daily 1 Package 6     fluticasone-salmeterol (ADVAIR) 100-50 MCG/DOSE diskus inhaler Inhale 1 puff into the lungs 2 times daily 1 Inhaler 3     hydrochlorothiazide 12.5 MG TABS tablet Take 1 tablet (12.5 mg) by mouth daily 90 tablet 1     losartan (COZAAR) 100 MG tablet TAKE 1 TABLET(100 MG) BY MOUTH DAILY 90 tablet 3     sertraline (ZOLOFT) 100 MG tablet Take 2 tablets (200 mg) by mouth daily 180 tablet 3     traZODone (DESYREL) 50 MG tablet TAKE 1 TABLET(50 MG) BY MOUTH EVERY NIGHT AS NEEDED 90 tablet 3     VENTOLIN  (90 Base) MCG/ACT Inhaler INHALE 2 PUFFS INTO THE LUNGS EVERY 4 HOURS AS NEEDED 18 g 0     albuterol (2.5 MG/3ML) 0.083% neb solution Take 1 vial (2.5 mg) by nebulization once for 1 dose 1 vial 0     montelukast (SINGULAIR) 10 MG tablet Take 1 tablet (10 mg) by mouth At Bedtime (Patient not taking: Reported on 6/14/2018) 90 tablet 1     Allergies   Allergen Reactions     Seasonal Allergies      BP  Readings from Last 3 Encounters:   01/03/19 136/82   06/14/18 138/86   01/12/18 (!) 161/112    Wt Readings from Last 3 Encounters:   01/03/19 93.9 kg (207 lb)   06/14/18 90.7 kg (200 lb)   01/12/18 92.9 kg (204 lb 12 oz)            Reviewed and updated as needed this visit by clinical staffTobacco  Allergies  Meds  Med Hx  Surg Hx  Fam Hx  Soc Hx      Reviewed and updated as needed this visit by Provider         ROS:  Constitutional, HEENT, cardiovascular, pulmonary, gi and gu systems are negative, except as otherwise noted.      OBJECTIVE:   /82 (BP Location: Left arm, Patient Position: Sitting, Cuff Size: Adult Regular)   Pulse 88   Temp 97.9  F (36.6  C) (Oral)   Resp 17   Wt 93.9 kg (207 lb)   SpO2 97%   BMI 34.98 kg/m    Body mass index is 34.98 kg/m .  GENERAL: healthy, alert and no distress  RESP: lungs clear to auscultation - no rales, rhonchi or wheezes  CV: regular rate and rhythm, normal S1 S2, no S3 or S4, no murmur, click or rub, no peripheral edema and peripheral pulses strong  MS: no gross musculoskeletal defects noted, no edema  PSYCH: mentation appears normal, affect normal/bright    Diagnostic Test Results:  none     ASSESSMENT/PLAN:     Hypertension; UNcontrolled   Associated with the following complications:    None   Plan:  No changes in the patient's current treatment plan but take medicine daily and work on diet and exercise; Fasting labs to be done today.    Depression; recurrent episode-- Mild   Associated with the following complications:    None   Plan:  Continue previous prescription, refills sent to pharmacy and continue with buspar as needed per patient instructions.          ICD-10-CM    1. Mild recurrent major depression (H) F33.0 sertraline (ZOLOFT) 100 MG tablet   2. Anxiety F41.9 busPIRone (BUSPAR) 10 MG tablet   3. Hypertension goal BP (blood pressure) < 140/90 I10 losartan (COZAAR) 100 MG tablet     Comprehensive metabolic panel   4. Insomnia, unspecified type  "G47.00 traZODone (DESYREL) 50 MG tablet   5. Screening for thyroid disorder Z13.29 TSH with free T4 reflex   6. CARDIOVASCULAR SCREENING; LDL GOAL LESS THAN 160 Z13.6 Lipid panel reflex to direct LDL       Patient Instructions   Discussed the pathophysiology of anxiety/depression episodes and the various symptoms seen associated with anxiety episodes. Discussed possible triggers including fatigue, depression, stress, and chemicals such as alcohol, caffeine and certain drugs. Discussed the treatment including an aerobic exercise program, adequate rest, and both rescue meds and maintenance meds.   For your anxiety:   1. Consider therapy - CBT - cognitive behavioral therapy - Ruy Cabral's card given to patient.  2. \"The Chemistry of Calm\" by Amadou Blue   3. \"Hope and Help for your Nerves\" by Leelee Lezama   4. Vitamin D 3522-9736 IU daily   5. Valerian root extract for relaxation and sleep OR Melatonin at bedtime.  Continue with Zoloft 200 mg daily with trazodone 50 mg nightly.    Trial of Buspar 10 mg nightly then as needed if needed/tolerate for increased anxiety - max dose 30 mg two times a day.     Call Central Scheduling [phone] to schedule your mammogram and colonoscopy.           Lorrie Macias PA-C  [facility]     "

## 2019-01-04 ASSESSMENT — ANXIETY QUESTIONNAIRES: GAD7 TOTAL SCORE: 6

## 2019-03-15 DIAGNOSIS — J20.9 ACUTE BRONCHITIS, UNSPECIFIED ORGANISM: ICD-10-CM

## 2019-03-15 DIAGNOSIS — J98.09 BRONCHOSPASTIC AIRWAY DISEASE: ICD-10-CM

## 2019-03-18 NOTE — TELEPHONE ENCOUNTER
According to [employer], the Wixela inhub is the same as the Advair fluticasone-salmeterol 100-50.    SHERIE Enamorado

## 2019-03-18 NOTE — TELEPHONE ENCOUNTER
"Requested Prescriptions   Pending Prescriptions Disp Refills     albuterol (PROAIR HFA/PROVENTIL HFA/VENTOLIN HFA) 108 (90 Base) MCG/ACT inhaler [Pharmacy Med Name: ALBUTEROL HFA INH (200 PUFFS) 18GM]  Not on current med list  Last Written Prescription Date:    Last Fill Quantity: ,  # refills:    Last Office Visit: 1/3/2019   Future Office Visit:      18 g 0     Sig: INHALE 2 PUFFS INTO THE LUNGS EVERY 4 HOURS AS NEEDED    Asthma Maintenance Inhalers - Anticholinergics Failed - 3/15/2019  5:51 PM       Failed - Asthma control assessment score within normal limits in last 6 months    Please review ACT score.   No flowsheet data found.         Passed - Patient is age 12 years or older       Passed - Medication is active on med list       Passed - Recent (6 mo) or future (30 days) visit within the authorizing provider's specialty    Patient had office visit in the last 6 months or has a visit in the next 30 days with authorizing provider or within the authorizing provider's specialty.  See \"Patient Info\" tab in inbasket, or \"Choose Columns\" in Meds & Orders section of the refill encounter.         _________________________________________________________________________________________________________________________       WIXELA INHUB 100-50 MCG/DOSE inhaler [Pharmacy Med Name: WIXELA INHUB DISKUS 100/50MCG 60S]  Last Written Prescription Date:  1/12/2018  Last Fill Quantity: 1,  # refills: 3   Last Office Visit: 1/3/2019   Future Office Visit:       0     Sig: INHALE 1 PUFF INTO THE LUNGS TWICE DAILY    Inhaled Steroids Protocol Failed - 3/15/2019  5:51 PM       Failed - Asthma control assessment score within normal limits in last 6 months    Please review ACT score.   No flowsheet data found.         Passed - Patient is age 12 or older       Passed - Medication is active on med list       Passed - Recent (6 mo) or future (30 days) visit within the authorizing provider's specialty    Patient had office visit in the " "last 6 months or has a visit in the next 30 days with authorizing provider or within the authorizing provider's specialty.  See \"Patient Info\" tab in inbasket, or \"Choose Columns\" in Meds & Orders section of the refill encounter.              "

## 2019-03-19 RX ORDER — ALBUTEROL SULFATE 90 UG/1
AEROSOL, METERED RESPIRATORY (INHALATION)
Qty: 18 G | Refills: 0 | Status: SHIPPED | OUTPATIENT
Start: 2019-03-19 | End: 2020-04-07

## 2019-03-19 NOTE — TELEPHONE ENCOUNTER
Routing refill request to provider for review/approval because:  --Associated diagnosis not on refill protocol and appear to be for acute care.  --Routing to provider to authorize if ok.      Last OV : 1/3/19 [facility] for chronic care.

## 2019-11-08 ENCOUNTER — HEALTH MAINTENANCE LETTER (OUTPATIENT)
Age: 54
End: 2019-11-08

## 2020-01-04 DIAGNOSIS — F33.0 MILD RECURRENT MAJOR DEPRESSION (H): ICD-10-CM

## 2020-01-05 NOTE — TELEPHONE ENCOUNTER
"Requested Prescriptions   Pending Prescriptions Disp Refills     sertraline (ZOLOFT) 100 MG tablet [Pharmacy Med Name: SERTRALINE 100MG  Last Written Prescription Date:  1/3/19  Last Fill Quantity: 180,  # refills: 3   Last office visit: 1/3/2019 with prescribing provider:     Future Office Visit:     TABLETS] 180 tablet 3     Sig: TAKE 2 TABLETS(200 MG) BY MOUTH DAILY       SSRIs Protocol Failed - 1/4/2020  9:38 PM        Failed - PHQ-9 score less than 5 in past 6 months     Please review last PHQ-9 score.           Failed - Recent (6 mo) or future (30 days) visit within the authorizing provider's specialty     Patient had office visit in the last 6 months or has a visit in the next 30 days with authorizing provider or within the authorizing provider's specialty.  See \"Patient Info\" tab in inbasket, or \"Choose Columns\" in Meds & Orders section of the refill encounter.            Passed - Medication is active on med list        Passed - Patient is age 18 or older        Passed - No active pregnancy on record        Passed - No positive pregnancy test in last 12 months          "

## 2020-01-06 RX ORDER — SERTRALINE HYDROCHLORIDE 100 MG/1
TABLET, FILM COATED ORAL
Qty: 60 TABLET | Refills: 0 | Status: SHIPPED | OUTPATIENT
Start: 2020-01-06 | End: 2020-02-11

## 2020-01-06 NOTE — TELEPHONE ENCOUNTER
Routing refill request to provider for review/approval because:  --Last PHQ-9 >4 and is overdue.  --Plan in last office visit 1/3/19 was to RTC in six months.       ,  --Please contact patient and ask him to schedule an annual office visit with Salvatore.    --A refill request for below medication was sent to the provider.      Last Office Visit: 1/3/2019   Future Office Visit:  None      PHQ-9 SCORE 5/10/2017 1/12/2018 1/3/2019   PHQ-9 Total Score - - -   PHQ-9 Total Score MyChart - - -   PHQ-9 Total Score 2 9 7

## 2020-01-06 NOTE — TELEPHONE ENCOUNTER
Patient due for yearly physical. Narvalous message sent to patient.   Will wait to see if pt reads it.      Gilda BORJA     [facility]

## 2020-01-10 DIAGNOSIS — G47.00 INSOMNIA, UNSPECIFIED TYPE: ICD-10-CM

## 2020-01-10 RX ORDER — TRAZODONE HYDROCHLORIDE 50 MG/1
TABLET, FILM COATED ORAL
Qty: 30 TABLET | Refills: 0 | Status: SHIPPED | OUTPATIENT
Start: 2020-01-10 | End: 2020-02-14

## 2020-01-10 NOTE — TELEPHONE ENCOUNTER
"Requested Prescriptions   Pending Prescriptions Disp Refills     traZODone (DESYREL) 50 MG tablet [Pharmacy Med Name: TRAZODONE 50MG TABLETS]  Last Written Prescription Date:  1/3/2019  Last Fill Quantity: 90 tabs,  # refills: 3   Last office visit: 1/3/2019 with prescribing provider:  Colleen   Future Office Visit:   90 tablet 3     Sig: TAKE 1 TABLET(50 MG) BY MOUTH EVERY NIGHT AS NEEDED       Serotonin Modulators Failed - 1/10/2020  3:35 AM        Failed - Recent (12 mo) or future (30 days) visit within the authorizing provider's specialty     Patient has had an office visit with the authorizing provider or a provider within the authorizing providers department within the previous 12 mos or has a future within next 30 days. See \"Patient Info\" tab in inbasket, or \"Choose Columns\" in Meds & Orders section of the refill encounter.              Passed - Medication is active on med list        Passed - Patient is age 18 or older        Passed - No active pregnancy on record        Passed - No positive pregnancy test in past 12 months         "

## 2020-01-11 NOTE — TELEPHONE ENCOUNTER
Signed Prescriptions:                        Disp   Refills    traZODone (DESYREL) 50 MG tablet           30 tab*0        Sig: TAKE 1 TABLET(50 MG) BY MOUTH EVERY NIGHT AS NEEDED  Authorizing Provider: COLETTE PERRY  Ordering User: ANABEL GODDARD

## 2020-02-08 DIAGNOSIS — F33.0 MILD RECURRENT MAJOR DEPRESSION (H): ICD-10-CM

## 2020-02-10 NOTE — TELEPHONE ENCOUNTER
The patient is out of her medication but she does have an appointment with PCP on 2/14. The patient is requesting enough of a refill to last until her appointment.  Please follow up with the patient.

## 2020-02-10 NOTE — TELEPHONE ENCOUNTER
"Requested Prescriptions   Pending Prescriptions Disp Refills     sertraline (ZOLOFT) 100 MG tablet [Pharmacy Med Name: SERTRALINE 100MG TABLETS] 60 tablet 0     Sig: TAKE 2 TABLETS(200 MG) BY MOUTH DAILY  Last Written Prescription Date:  1/6/2020  Last Fill Quantity: 60 tablet,  # refills: 0   Last Office Visit: 1/3/2019   Future Office Visit:    Next 5 appointments (look out 90 days)    Feb 14, 2020  2:40 PM CST  PHYSICAL with Lorrie Macias PA-C  [facility] ([facility]) [address] [place] [id number] [phone]              SSRIs Protocol Failed - 2/8/2020  8:41 PM        Failed - PHQ-9 score less than 5 in past 6 months     Please review last PHQ-9 score.   PHQ-9 SCORE 5/10/2017 1/12/2018 1/3/2019   PHQ-9 Total Score - - -   PHQ-9 Total Score MyChart - - -   PHQ-9 Total Score 2 9 7     RAUL-7 SCORE 1/12/2018 1/3/2019   Total Score 12 6           Passed - Medication is active on med list        Passed - Patient is age 18 or older        Passed - No active pregnancy on record        Passed - No positive pregnancy test in last 12 months        Passed - Recent (6 mo) or future (30 days) visit within the authorizing provider's specialty     Patient had office visit in the last 6 months or has a visit in the next 30 days with authorizing provider or within the authorizing provider's specialty.  See \"Patient Info\" tab in inbasket, or \"Choose Columns\" in Meds & Orders section of the refill encounter.              "

## 2020-02-11 RX ORDER — SERTRALINE HYDROCHLORIDE 100 MG/1
TABLET, FILM COATED ORAL
Qty: 60 TABLET | Refills: 0 | Status: SHIPPED | OUTPATIENT
Start: 2020-02-11 | End: 2020-02-14

## 2020-02-11 ASSESSMENT — ENCOUNTER SYMPTOMS
WEAKNESS: 0
DIARRHEA: 0
DYSURIA: 0
BREAST MASS: 0
HEADACHES: 0
PALPITATIONS: 0
CHILLS: 0
NAUSEA: 0
PARESTHESIAS: 0
SORE THROAT: 0
COUGH: 0
EYE PAIN: 0
SHORTNESS OF BREATH: 0
ARTHRALGIAS: 0
ABDOMINAL PAIN: 0
MYALGIAS: 0
NERVOUS/ANXIOUS: 0
FREQUENCY: 0
HEMATOCHEZIA: 0
HEMATURIA: 0
CONSTIPATION: 0
FEVER: 0
DIZZINESS: 0
HEARTBURN: 0
JOINT SWELLING: 0

## 2020-02-11 NOTE — TELEPHONE ENCOUNTER
Routing to provider with one month supply pended.  Patient now has upcoming appointment.  Will need PHQ 9 updated at appointment  Added to appointment note  aRjni Samson RN

## 2020-02-13 ASSESSMENT — ENCOUNTER SYMPTOMS
SHORTNESS OF BREATH: 0
FREQUENCY: 0
BREAST MASS: 0
ARTHRALGIAS: 0
HEADACHES: 0
PARESTHESIAS: 0
HEMATOCHEZIA: 0
ABDOMINAL PAIN: 0
DIZZINESS: 0
HEMATURIA: 0
PALPITATIONS: 0
DIARRHEA: 0
HEARTBURN: 0
SORE THROAT: 0
CONSTIPATION: 0
MYALGIAS: 0
WEAKNESS: 0
NAUSEA: 0
CHILLS: 0
DYSURIA: 0
NERVOUS/ANXIOUS: 0
COUGH: 0
FEVER: 0
JOINT SWELLING: 0
EYE PAIN: 0

## 2020-02-13 NOTE — PATIENT INSTRUCTIONS

## 2020-02-13 NOTE — PROGRESS NOTES
SUBJECTIVE:   CC: Trini Ramirez is an 55 year old woman who presents for preventive health visit.     Healthy Habits:     Getting at least 3 servings of Calcium per day:  Yes    Bi-annual eye exam:  Yes    Dental care twice a year:  NO    Sleep apnea or symptoms of sleep apnea:  None    Diet:  Regular (no restrictions)    Frequency of exercise:  None    Taking medications regularly:  Yes    Medication side effects:  Not applicable    PHQ-2 Total Score: 0    Additional concerns today:  No    Patient taking hydrochlorothiazide 12.5 mg (has not been taking recently) and losartan 100 mg for hypertension.    Today's PHQ-2 Score:   PHQ-2 ( 1999 Pfizer) 2/11/2020   Q1: Little interest or pleasure in doing things 0   Q2: Feeling down, depressed or hopeless 0   PHQ-2 Score 0   Q1: Little interest or pleasure in doing things Not at all   Q2: Feeling down, depressed or hopeless Not at all   PHQ-2 Score 0       Abuse: Current or Past(Physical, Sexual or Emotional)- No  Do you feel safe in your environment? Yes        Social History     Tobacco Use     Smoking status: Never Smoker     Smokeless tobacco: Never Used   Substance Use Topics     Alcohol use: Yes     Alcohol/week: 0.0 standard drinks     Comment: occ         Alcohol Use 2/11/2020   Prescreen: >3 drinks/day or >7 drinks/week? No   Prescreen: >3 drinks/day or >7 drinks/week? -       Reviewed orders with patient.  Reviewed health maintenance and updated orders accordingly - Yes  Lab work is in process  Labs reviewed in EPIC  BP Readings from Last 3 Encounters:   02/14/20 (!) 154/106   01/03/19 136/82   06/14/18 138/86    Wt Readings from Last 3 Encounters:   02/14/20 92.1 kg (203 lb)   01/03/19 93.9 kg (207 lb)   06/14/18 90.7 kg (200 lb)                  Patient Active Problem List   Diagnosis     Major depressive disorder, single episode in full remission (H)     CARDIOVASCULAR SCREENING; LDL GOAL LESS THAN 160     Seasonal allergic rhinitis     Metatarsalgia of  right foot     Hypertension goal BP (blood pressure) < 140/90     Other closed extra-articular fracture of distal end of left radius, initial encounter     Past Surgical History:   Procedure Laterality Date     AS TMJ ARTHROSCOPY/SURGERY        SECTION       OPEN REDUCTION INTERNAL FIXATION WRIST Left 2016    Procedure: OPEN REDUCTION INTERNAL FIXATION WRIST;  Surgeon: Fritz Reid MD;  Location: [facility] OR       Social History     Tobacco Use     Smoking status: Never Smoker     Smokeless tobacco: Never Used   Substance Use Topics     Alcohol use: Yes     Alcohol/week: 0.0 standard drinks     Comment: occ     Family History   Problem Relation Age of Onset     Depression Mother      Hypertension Father      Depression Father      Substance Abuse Father      Depression Brother      Diabetes Maternal Grandfather      Substance Abuse Brother      Hypertension Brother      Hypertension Brother      Depression Brother      Substance Abuse Brother      Mental Illness Brother          Current Outpatient Medications   Medication Sig Dispense Refill     albuterol (PROAIR HFA/PROVENTIL HFA/VENTOLIN HFA) 108 (90 Base) MCG/ACT inhaler INHALE 2 PUFFS INTO THE LUNGS EVERY 4 HOURS AS NEEDED 18 g 0     CLINDAMYCIN HCL PO Take 300 mg by mouth 4 times daily       fluticasone (FLONASE) 50 MCG/ACT nasal spray Spray 1-2 sprays into both nostrils daily 1 Package 6     hydrochlorothiazide (HYDRODIURIL) 12.5 MG tablet Take 1 tablet (12.5 mg) by mouth daily 90 tablet 1     hydrOXYzine (VISTARIL) 50 MG capsule Take 1 capsule (50 mg) by mouth 3 times daily as needed for itching 90 capsule 1     losartan (COZAAR) 100 MG tablet TAKE 1 TABLET(100 MG) BY MOUTH DAILY 90 tablet 1     montelukast (SINGULAIR) 10 MG tablet Take 1 tablet (10 mg) by mouth At Bedtime 90 tablet 1     sertraline (ZOLOFT) 100 MG tablet TAKE 2 TABLETS(200 MG) BY MOUTH DAILY 180 tablet 3     traZODone (DESYREL) 50 MG tablet Take 1-2 tablets ( mg) by  mouth At Bedtime 180 tablet 3     WIXELA INHUB 100-50 MCG/DOSE inhaler INHALE 1 PUFF INTO THE LUNGS TWICE DAILY 1 Inhaler 0     albuterol (2.5 MG/3ML) 0.083% neb solution Take 1 vial (2.5 mg) by nebulization once for 1 dose 1 vial 0     busPIRone (BUSPAR) 10 MG tablet Take 1 tablet (10 mg) by mouth 2 times daily 60 tablet 3     Allergies   Allergen Reactions     Seasonal Allergies      Recent Labs   Lab Test 01/03/19  0941 02/12/16  0808   * 144*   HDL 46* 44*   TRIG 139 177*   ALT 37  --    CR 0.77 0.69   GFRESTIMATED 88 90   GFRESTBLACK >90 >90  African American GFR Calc     POTASSIUM 3.9 3.8   TSH 1.99  --         Mammogram Screening: Patient over age 50, mutual decision to screen reflected in health maintenance.    Pertinent mammograms are reviewed under the imaging tab.  History of abnormal Pap smear: NO - age 30-65 PAP every 5 years with negative HPV co-testing recommended  PAP / HPV Latest Ref Rng & Units 5/13/2016   PAP - NIL   HPV 16 DNA NEG Negative   HPV 18 DNA NEG Negative   OTHER HR HPV NEG Negative     Reviewed and updated as needed this visit by clinical staff  Tobacco  Allergies  Meds  Problems  Med Hx  Surg Hx  Fam Hx  Soc Hx          Reviewed and updated as needed this visit by Provider  Tobacco  Allergies  Meds  Problems  Med Hx  Surg Hx  Fam Hx         Review of Systems   Constitutional: Negative for chills and fever.   HENT: Negative for congestion, ear pain, hearing loss and sore throat.    Eyes: Negative for pain and visual disturbance.   Respiratory: Negative for cough and shortness of breath.    Cardiovascular: Negative for chest pain, palpitations and peripheral edema.   Gastrointestinal: Negative for abdominal pain, constipation, diarrhea, heartburn, hematochezia and nausea.   Breasts:  Negative for tenderness, breast mass and discharge.   Genitourinary: Negative for dysuria, frequency, genital sores, hematuria, pelvic pain, urgency, vaginal bleeding and vaginal  "discharge.   Musculoskeletal: Negative for arthralgias, joint swelling and myalgias.   Skin: Negative for rash.   Neurological: Negative for dizziness, weakness, headaches and paresthesias.   Psychiatric/Behavioral: Negative for mood changes. The patient is not nervous/anxious.         OBJECTIVE:   BP (!) 154/106 (BP Location: Left arm, Patient Position: Chair, Cuff Size: Adult Regular)   Pulse 81   Temp 97.9  F (36.6  C) (Oral)   Resp 16   Ht 1.664 m (5' 5.5\")   Wt 92.1 kg (203 lb)   SpO2 98%   BMI 33.27 kg/m    Physical Exam  GENERAL: healthy, alert and no distress  EYES: Eyes grossly normal to inspection, PERRL and conjunctivae and sclerae normal  HENT: ear canals and TM's normal, nose and mouth without ulcers or lesions  NECK: no adenopathy, no asymmetry, masses, or scars and thyroid normal to palpation  RESP: lungs clear to auscultation - no rales, rhonchi or wheezes  BREAST: normal without masses, tenderness or nipple discharge and no palpable axillary masses or adenopathy  CV: regular rate and rhythm, normal S1 S2, no S3 or S4, no murmur, click or rub, no peripheral edema and peripheral pulses strong  ABDOMEN: soft, nontender, no hepatosplenomegaly, no masses and bowel sounds normal  MS: no gross musculoskeletal defects noted, no edema  SKIN: no suspicious lesions or rashes  NEURO: Normal strength and tone, mentation intact and speech normal  PSYCH: mentation appears normal, affect normal/bright    Diagnostic Test Results:  Labs reviewed in Epic    ASSESSMENT/PLAN:     (I10) Hypertension goal BP (blood pressure) < 140/90  Comment: Long standing, chronic, UNcontrolled  Plan: Comprehensive metabolic panel,         hydrochlorothiazide (HYDRODIURIL) 12.5 MG         tablet, losartan (COZAAR) 100 MG tablet        Restart daily hydrochlorothiazide! Labs updated today as well; continue to work on diet and exercise.    (F33.0) Mild recurrent major depression (H)  Comment: Long standing, chronic, " "controlled  Plan: sertraline (ZOLOFT) 100 MG tablet, hydrOXYzine         (VISTARIL) 50 MG capsule        Regular refills sent to pharmacy with option for vistaril as needed as well.    (G47.00) Insomnia, unspecified type  Comment: Long standing, chronic, controlled typically but could be better  Plan: traZODone (DESYREL) 50 MG tablet, hydrOXYzine         (VISTARIL) 50 MG capsule        trazodone dosage increased as one option and/or vistaril as needed sent to pharmacy as well.      ICD-10-CM    1. Routine general medical examination at a health care facility Z00.00    2. Hypertension goal BP (blood pressure) < 140/90 I10 Comprehensive metabolic panel     hydrochlorothiazide (HYDRODIURIL) 12.5 MG tablet     losartan (COZAAR) 100 MG tablet   3. Mild recurrent major depression (H) F33.0 sertraline (ZOLOFT) 100 MG tablet     hydrOXYzine (VISTARIL) 50 MG capsule   4. Insomnia, unspecified type G47.00 traZODone (DESYREL) 50 MG tablet     hydrOXYzine (VISTARIL) 50 MG capsule   5. Screening for thyroid disorder Z13.29 TSH with free T4 reflex   6. Screening, anemia, deficiency, iron Z13.0 CBC with platelets differential   7. CARDIOVASCULAR SCREENING; LDL GOAL LESS THAN 160 Z13.6 Lipid panel reflex to direct LDL Fasting       COUNSELING:  Reviewed preventive health counseling, as reflected in patient instructions       Regular exercise       Healthy diet/nutrition       Vision screening    Estimated body mass index is 33.27 kg/m  as calculated from the following:    Height as of this encounter: 1.664 m (5' 5.5\").    Weight as of this encounter: 92.1 kg (203 lb).    Weight management plan: Discussed healthy diet and exercise guidelines     reports that she has never smoked. She has never used smokeless tobacco.      Counseling Resources:  ATP IV Guidelines  Pooled Cohorts Equation Calculator  Breast Cancer Risk Calculator  FRAX Risk Assessment  ICSI Preventive Guidelines  Dietary Guidelines for Americans, 2010  USDA's " MyPlate  ASA Prophylaxis  Lung CA Screening    Lorrie Macias PA-C  [facility]

## 2020-02-14 ENCOUNTER — OFFICE VISIT (OUTPATIENT)
Dept: FAMILY MEDICINE | Facility: CLINIC | Age: 55
End: 2020-02-14
Payer: COMMERCIAL

## 2020-02-14 VITALS
RESPIRATION RATE: 16 BRPM | HEIGHT: 66 IN | BODY MASS INDEX: 32.62 KG/M2 | OXYGEN SATURATION: 98 % | WEIGHT: 203 LBS | TEMPERATURE: 97.9 F | SYSTOLIC BLOOD PRESSURE: 154 MMHG | HEART RATE: 81 BPM | DIASTOLIC BLOOD PRESSURE: 106 MMHG

## 2020-02-14 DIAGNOSIS — Z13.6 CARDIOVASCULAR SCREENING; LDL GOAL LESS THAN 160: ICD-10-CM

## 2020-02-14 DIAGNOSIS — G47.00 INSOMNIA, UNSPECIFIED TYPE: ICD-10-CM

## 2020-02-14 DIAGNOSIS — Z13.29 SCREENING FOR THYROID DISORDER: ICD-10-CM

## 2020-02-14 DIAGNOSIS — F33.0 MILD RECURRENT MAJOR DEPRESSION (H): ICD-10-CM

## 2020-02-14 DIAGNOSIS — Z13.0 SCREENING, ANEMIA, DEFICIENCY, IRON: ICD-10-CM

## 2020-02-14 DIAGNOSIS — I10 HYPERTENSION GOAL BP (BLOOD PRESSURE) < 140/90: ICD-10-CM

## 2020-02-14 DIAGNOSIS — Z00.00 ROUTINE GENERAL MEDICAL EXAMINATION AT A HEALTH CARE FACILITY: Primary | ICD-10-CM

## 2020-02-14 LAB
ALBUMIN SERPL-MCNC: 3.8 G/DL (ref 3.4–5)
ALP SERPL-CCNC: 105 U/L (ref 40–150)
ALT SERPL W P-5'-P-CCNC: 34 U/L (ref 0–50)
ANION GAP SERPL CALCULATED.3IONS-SCNC: 10 MMOL/L (ref 3–14)
AST SERPL W P-5'-P-CCNC: 15 U/L (ref 0–45)
BASOPHILS # BLD AUTO: 0 10E9/L (ref 0–0.2)
BASOPHILS NFR BLD AUTO: 0.2 %
BILIRUB SERPL-MCNC: 0.4 MG/DL (ref 0.2–1.3)
BUN SERPL-MCNC: 14 MG/DL (ref 7–30)
CALCIUM SERPL-MCNC: 9 MG/DL (ref 8.5–10.1)
CHLORIDE SERPL-SCNC: 106 MMOL/L (ref 94–109)
CHOLEST SERPL-MCNC: 249 MG/DL
CO2 SERPL-SCNC: 24 MMOL/L (ref 20–32)
CREAT SERPL-MCNC: 0.74 MG/DL (ref 0.52–1.04)
DIFFERENTIAL METHOD BLD: ABNORMAL
EOSINOPHIL # BLD AUTO: 0.1 10E9/L (ref 0–0.7)
EOSINOPHIL NFR BLD AUTO: 1.4 %
ERYTHROCYTE [DISTWIDTH] IN BLOOD BY AUTOMATED COUNT: 13.5 % (ref 10–15)
GFR SERPL CREATININE-BSD FRML MDRD: >90 ML/MIN/{1.73_M2}
GLUCOSE SERPL-MCNC: 93 MG/DL (ref 70–99)
HCT VFR BLD AUTO: 44.1 % (ref 35–47)
HDLC SERPL-MCNC: 37 MG/DL
HGB BLD-MCNC: 15.1 G/DL (ref 11.7–15.7)
LDLC SERPL CALC-MCNC: 180 MG/DL
LYMPHOCYTES # BLD AUTO: 2 10E9/L (ref 0.8–5.3)
LYMPHOCYTES NFR BLD AUTO: 23.5 %
MCH RBC QN AUTO: 28.1 PG (ref 26.5–33)
MCHC RBC AUTO-ENTMCNC: 34.2 G/DL (ref 31.5–36.5)
MCV RBC AUTO: 82 FL (ref 78–100)
MONOCYTES # BLD AUTO: 0.6 10E9/L (ref 0–1.3)
MONOCYTES NFR BLD AUTO: 6.7 %
NEUTROPHILS # BLD AUTO: 5.7 10E9/L (ref 1.6–8.3)
NEUTROPHILS NFR BLD AUTO: 68.2 %
NONHDLC SERPL-MCNC: 212 MG/DL
PLATELET # BLD AUTO: 217 10E9/L (ref 150–450)
POTASSIUM SERPL-SCNC: 3.8 MMOL/L (ref 3.4–5.3)
PROT SERPL-MCNC: 7.5 G/DL (ref 6.8–8.8)
RBC # BLD AUTO: 5.38 10E12/L (ref 3.8–5.2)
SODIUM SERPL-SCNC: 140 MMOL/L (ref 133–144)
TRIGL SERPL-MCNC: 158 MG/DL
TSH SERPL DL<=0.005 MIU/L-ACNC: 3.85 MU/L (ref 0.4–4)
WBC # BLD AUTO: 8.3 10E9/L (ref 4–11)

## 2020-02-14 PROCEDURE — 36415 COLL VENOUS BLD VENIPUNCTURE: CPT | Performed by: PHYSICIAN ASSISTANT

## 2020-02-14 PROCEDURE — 80050 GENERAL HEALTH PANEL: CPT | Performed by: PHYSICIAN ASSISTANT

## 2020-02-14 PROCEDURE — 99396 PREV VISIT EST AGE 40-64: CPT | Performed by: PHYSICIAN ASSISTANT

## 2020-02-14 PROCEDURE — 96127 BRIEF EMOTIONAL/BEHAV ASSMT: CPT | Performed by: PHYSICIAN ASSISTANT

## 2020-02-14 PROCEDURE — 99214 OFFICE O/P EST MOD 30 MIN: CPT | Mod: 25 | Performed by: PHYSICIAN ASSISTANT

## 2020-02-14 PROCEDURE — 80061 LIPID PANEL: CPT | Performed by: PHYSICIAN ASSISTANT

## 2020-02-14 RX ORDER — LOSARTAN POTASSIUM 100 MG/1
TABLET ORAL
Qty: 90 TABLET | Refills: 1 | Status: SHIPPED | OUTPATIENT
Start: 2020-02-14 | End: 2020-03-18

## 2020-02-14 RX ORDER — HYDROXYZINE PAMOATE 50 MG/1
50 CAPSULE ORAL 3 TIMES DAILY PRN
Qty: 90 CAPSULE | Refills: 1 | Status: SHIPPED | OUTPATIENT
Start: 2020-02-14 | End: 2020-03-18

## 2020-02-14 RX ORDER — TRAZODONE HYDROCHLORIDE 50 MG/1
50-100 TABLET, FILM COATED ORAL AT BEDTIME
Qty: 180 TABLET | Refills: 3 | Status: SHIPPED | OUTPATIENT
Start: 2020-02-14 | End: 2020-03-18

## 2020-02-14 RX ORDER — SERTRALINE HYDROCHLORIDE 100 MG/1
TABLET, FILM COATED ORAL
Qty: 180 TABLET | Refills: 3 | Status: SHIPPED | OUTPATIENT
Start: 2020-02-14 | End: 2020-03-18

## 2020-02-14 RX ORDER — HYDROCHLOROTHIAZIDE 12.5 MG/1
12.5 TABLET ORAL DAILY
Qty: 90 TABLET | Refills: 1 | Status: SHIPPED | OUTPATIENT
Start: 2020-02-14 | End: 2020-03-18

## 2020-02-14 ASSESSMENT — ANXIETY QUESTIONNAIRES
4. TROUBLE RELAXING: NOT AT ALL
7. FEELING AFRAID AS IF SOMETHING AWFUL MIGHT HAPPEN: SEVERAL DAYS
GAD7 TOTAL SCORE: 3
7. FEELING AFRAID AS IF SOMETHING AWFUL MIGHT HAPPEN: SEVERAL DAYS
2. NOT BEING ABLE TO STOP OR CONTROL WORRYING: NOT AT ALL
6. BECOMING EASILY ANNOYED OR IRRITABLE: NOT AT ALL
5. BEING SO RESTLESS THAT IT IS HARD TO SIT STILL: NOT AT ALL
3. WORRYING TOO MUCH ABOUT DIFFERENT THINGS: SEVERAL DAYS
GAD7 TOTAL SCORE: 3
GAD7 TOTAL SCORE: 3
1. FEELING NERVOUS, ANXIOUS, OR ON EDGE: SEVERAL DAYS

## 2020-02-14 ASSESSMENT — PATIENT HEALTH QUESTIONNAIRE - PHQ9
10. IF YOU CHECKED OFF ANY PROBLEMS, HOW DIFFICULT HAVE THESE PROBLEMS MADE IT FOR YOU TO DO YOUR WORK, TAKE CARE OF THINGS AT HOME, OR GET ALONG WITH OTHER PEOPLE: SOMEWHAT DIFFICULT
SUM OF ALL RESPONSES TO PHQ QUESTIONS 1-9: 2
SUM OF ALL RESPONSES TO PHQ QUESTIONS 1-9: 2

## 2020-02-14 ASSESSMENT — MIFFLIN-ST. JEOR: SCORE: 1529.61

## 2020-02-15 ASSESSMENT — PATIENT HEALTH QUESTIONNAIRE - PHQ9: SUM OF ALL RESPONSES TO PHQ QUESTIONS 1-9: 2

## 2020-02-15 ASSESSMENT — ANXIETY QUESTIONNAIRES: GAD7 TOTAL SCORE: 3

## 2020-02-17 NOTE — RESULT ENCOUNTER NOTE
"Sol Feliz  Your attached labs are overall within normal limits and stable, but your cholesterol is elevated.  Desired or goal levels are:  CHOLESTEROL: Desirable is less than 200.  HDL (Good Cholesterol): Desirable is greater than 40 in men and greater than 50 in women.  LDL (Bad Cholesterol): Desirable is less than 130 or less than 100 in patients with diabetes or vascular disease. For some patients with diabetes or vascular disease, the desireable LDL is less that 70.  TRIGLYCERIDES: Desirable is less than 150.    Your LDL cholesterol is sometimes called \"bad cholesterol\" because it contributes to heart disease and stroke. This can be high due to both genetics (which you can't change) and diet (which you may be able to).  If you aren't already, I recommend increasing whole grains with every meal, and adding flax seed to your food.       One easy way to do this is to eat oatmeal every morning.  Steel cut oats take longer to cook but are better for you than instant, but any oatmeal at all improves your cholesterol.    You can mix or add ground flax seed to hot cereals, yogurt, applesauce, cottage cheese or any sauce mixture or baked good. Ground flax seed can be found in the baking aisle near the flour. The recommended dose of flax seen is 2 heaping tablespoonfuls daily, you may want to start with 1 tablespoonful and increase to 2 heaping tablespoonfuls.     You can also take Omega-3 fatty acids (available in capsules) to improve your overall levels, especially your triglycerides. You need 2000 - 4000 mg daily of EPA + DHA. This usually means 4 - 8 capsules a day, depending on the strength you buy or you can try taking Red Yeast Rice (an herbal supplement that contains a natural statin) start with 600 mg once daily and increase to 1200 mg twice daily as tolerated.     Here are some ways to improve your nutrition (adapted from the American Academy of [employer] handout):  Eat less fat (especially butter, Crisco " "and other saturated fats)  Buy lean cuts of meat; reduce your portions of red meat or substitute poultry or fish  Use skim milk and low-fat dairy products  Eat no more than 4 egg yolks per week  Avoid fried or fast foods that are high in fat  Eat more fruits and vegetables    Also consider starting or increasing your aerobic activity; this is the best way to improve HDL (good) cholesterol. If aerobic activity would be new to you, please talk with me first about what activities are safe for you.    If you are able to make changes, I recommend rechecking your fasting lipids in about 2-3 months to see if your cholesterol has improved.      Please contact the office with any questions or concerns.    Lorrie Ball \"Salvatore\" CHEYANNE Macias    "

## 2020-03-18 ENCOUNTER — APPOINTMENT (OUTPATIENT)
Dept: CT IMAGING | Facility: CLINIC | Age: 55
DRG: 066 | End: 2020-03-18
Attending: PHYSICIAN ASSISTANT
Payer: COMMERCIAL

## 2020-03-18 ENCOUNTER — HOSPITAL ENCOUNTER (INPATIENT)
Facility: CLINIC | Age: 55
LOS: 1 days | Discharge: HOME | DRG: 066 | End: 2020-03-19
Attending: EMERGENCY MEDICINE | Admitting: INTERNAL MEDICINE
Payer: COMMERCIAL

## 2020-03-18 ENCOUNTER — APPOINTMENT (OUTPATIENT)
Dept: MRI IMAGING | Facility: CLINIC | Age: 55
DRG: 066 | End: 2020-03-18
Attending: EMERGENCY MEDICINE
Payer: COMMERCIAL

## 2020-03-18 DIAGNOSIS — I63.9 CEREBROVASCULAR ACCIDENT (CVA), UNSPECIFIED MECHANISM (H): Primary | ICD-10-CM

## 2020-03-18 DIAGNOSIS — I63.9 ACUTE ISCHEMIC STROKE (H): ICD-10-CM

## 2020-03-18 LAB
ANION GAP SERPL CALCULATED.3IONS-SCNC: 5 MMOL/L (ref 3–14)
APTT PPP: 27 SEC (ref 22–37)
BASOPHILS # BLD AUTO: 0 10E9/L (ref 0–0.2)
BASOPHILS NFR BLD AUTO: 0.4 %
BUN SERPL-MCNC: 14 MG/DL (ref 7–30)
CALCIUM SERPL-MCNC: 9.1 MG/DL (ref 8.5–10.1)
CHLORIDE SERPL-SCNC: 106 MMOL/L (ref 94–109)
CHOLEST SERPL-MCNC: 280 MG/DL
CO2 SERPL-SCNC: 26 MMOL/L (ref 20–32)
CREAT SERPL-MCNC: 0.92 MG/DL (ref 0.52–1.04)
DIFFERENTIAL METHOD BLD: ABNORMAL
EOSINOPHIL # BLD AUTO: 0.1 10E9/L (ref 0–0.7)
EOSINOPHIL NFR BLD AUTO: 1.6 %
ERYTHROCYTE [DISTWIDTH] IN BLOOD BY AUTOMATED COUNT: 13.6 % (ref 10–15)
GFR SERPL CREATININE-BSD FRML MDRD: 71 ML/MIN/{1.73_M2}
GLUCOSE BLDC GLUCOMTR-MCNC: 87 MG/DL (ref 70–99)
GLUCOSE BLDC GLUCOMTR-MCNC: 87 MG/DL (ref 70–99)
GLUCOSE SERPL-MCNC: 101 MG/DL (ref 70–99)
HBA1C MFR BLD: 5.3 % (ref 0–5.6)
HCT VFR BLD AUTO: 45.6 % (ref 35–47)
HDLC SERPL-MCNC: 40 MG/DL
HGB BLD-MCNC: 15.7 G/DL (ref 11.7–15.7)
IMM GRANULOCYTES # BLD: 0 10E9/L (ref 0–0.4)
IMM GRANULOCYTES NFR BLD: 0.3 %
INR PPP: 1.08 (ref 0.86–1.14)
INTERPRETATION ECG - MUSE: NORMAL
LDLC SERPL CALC-MCNC: 208 MG/DL
LYMPHOCYTES # BLD AUTO: 1.6 10E9/L (ref 0.8–5.3)
LYMPHOCYTES NFR BLD AUTO: 23.9 %
MCH RBC QN AUTO: 26.7 PG (ref 26.5–33)
MCHC RBC AUTO-ENTMCNC: 34.4 G/DL (ref 31.5–36.5)
MCV RBC AUTO: 78 FL (ref 78–100)
MONOCYTES # BLD AUTO: 0.6 10E9/L (ref 0–1.3)
MONOCYTES NFR BLD AUTO: 8.9 %
NEUTROPHILS # BLD AUTO: 4.4 10E9/L (ref 1.6–8.3)
NEUTROPHILS NFR BLD AUTO: 64.9 %
NONHDLC SERPL-MCNC: 240 MG/DL
PLATELET # BLD AUTO: 215 10E9/L (ref 150–450)
POTASSIUM SERPL-SCNC: 3.2 MMOL/L (ref 3.4–5.3)
RBC # BLD AUTO: 5.87 10E12/L (ref 3.8–5.2)
SODIUM SERPL-SCNC: 137 MMOL/L (ref 133–144)
TRIGL SERPL-MCNC: 159 MG/DL
TROPONIN I SERPL-MCNC: <0.015 UG/L (ref 0–0.04)
WBC # BLD AUTO: 6.8 10E9/L (ref 4–11)

## 2020-03-18 PROCEDURE — 99285 EMERGENCY DEPT VISIT HI MDM: CPT | Mod: 25

## 2020-03-18 PROCEDURE — 25500064 ZZH RX 255 OP 636: Performed by: EMERGENCY MEDICINE

## 2020-03-18 PROCEDURE — 80061 LIPID PANEL: CPT | Performed by: EMERGENCY MEDICINE

## 2020-03-18 PROCEDURE — 00000146 ZZHCL STATISTIC GLUCOSE BY METER IP

## 2020-03-18 PROCEDURE — 99221 1ST HOSP IP/OBS SF/LOW 40: CPT | Performed by: NURSE PRACTITIONER

## 2020-03-18 PROCEDURE — 25000128 H RX IP 250 OP 636: Performed by: INTERNAL MEDICINE

## 2020-03-18 PROCEDURE — 12000000 ZZH R&B MED SURG/OB

## 2020-03-18 PROCEDURE — 70553 MRI BRAIN STEM W/O & W/DYE: CPT

## 2020-03-18 PROCEDURE — 70496 CT ANGIOGRAPHY HEAD: CPT

## 2020-03-18 PROCEDURE — 85025 COMPLETE CBC W/AUTO DIFF WBC: CPT | Performed by: EMERGENCY MEDICINE

## 2020-03-18 PROCEDURE — 83036 HEMOGLOBIN GLYCOSYLATED A1C: CPT | Performed by: EMERGENCY MEDICINE

## 2020-03-18 PROCEDURE — 99223 1ST HOSP IP/OBS HIGH 75: CPT | Mod: AI | Performed by: PHYSICIAN ASSISTANT

## 2020-03-18 PROCEDURE — 36415 COLL VENOUS BLD VENIPUNCTURE: CPT | Performed by: PHYSICIAN ASSISTANT

## 2020-03-18 PROCEDURE — 85610 PROTHROMBIN TIME: CPT | Performed by: EMERGENCY MEDICINE

## 2020-03-18 PROCEDURE — 85730 THROMBOPLASTIN TIME PARTIAL: CPT | Performed by: EMERGENCY MEDICINE

## 2020-03-18 PROCEDURE — 84484 ASSAY OF TROPONIN QUANT: CPT | Performed by: PHYSICIAN ASSISTANT

## 2020-03-18 PROCEDURE — 80048 BASIC METABOLIC PNL TOTAL CA: CPT | Performed by: EMERGENCY MEDICINE

## 2020-03-18 PROCEDURE — A9585 GADOBUTROL INJECTION: HCPCS | Performed by: EMERGENCY MEDICINE

## 2020-03-18 PROCEDURE — 25000128 H RX IP 250 OP 636: Performed by: PHYSICIAN ASSISTANT

## 2020-03-18 PROCEDURE — 25000125 ZZHC RX 250: Performed by: INTERNAL MEDICINE

## 2020-03-18 PROCEDURE — 25000132 ZZH RX MED GY IP 250 OP 250 PS 637: Performed by: PHYSICIAN ASSISTANT

## 2020-03-18 PROCEDURE — 93005 ELECTROCARDIOGRAM TRACING: CPT

## 2020-03-18 PROCEDURE — 25000132 ZZH RX MED GY IP 250 OP 250 PS 637: Performed by: NURSE PRACTITIONER

## 2020-03-18 RX ORDER — AMOXICILLIN 250 MG
2 CAPSULE ORAL 2 TIMES DAILY
Status: DISCONTINUED | OUTPATIENT
Start: 2020-03-18 | End: 2020-03-19 | Stop reason: HOSPADM

## 2020-03-18 RX ORDER — NALOXONE HYDROCHLORIDE 0.4 MG/ML
.1-.4 INJECTION, SOLUTION INTRAMUSCULAR; INTRAVENOUS; SUBCUTANEOUS
Status: DISCONTINUED | OUTPATIENT
Start: 2020-03-18 | End: 2020-03-19 | Stop reason: HOSPADM

## 2020-03-18 RX ORDER — HYDROXYZINE PAMOATE 25 MG/1
50 CAPSULE ORAL AT BEDTIME
Status: DISCONTINUED | OUTPATIENT
Start: 2020-03-18 | End: 2020-03-18

## 2020-03-18 RX ORDER — ONDANSETRON 2 MG/ML
4 INJECTION INTRAMUSCULAR; INTRAVENOUS EVERY 6 HOURS PRN
Status: DISCONTINUED | OUTPATIENT
Start: 2020-03-18 | End: 2020-03-19 | Stop reason: HOSPADM

## 2020-03-18 RX ORDER — FLUTICASONE PROPIONATE 50 MCG
1-2 SPRAY, SUSPENSION (ML) NASAL DAILY PRN
Status: DISCONTINUED | OUTPATIENT
Start: 2020-03-18 | End: 2020-03-19 | Stop reason: HOSPADM

## 2020-03-18 RX ORDER — ASPIRIN 300 MG/1
300 SUPPOSITORY RECTAL DAILY
Status: CANCELLED | OUTPATIENT
Start: 2020-03-18

## 2020-03-18 RX ORDER — ASPIRIN 81 MG/1
81 TABLET ORAL DAILY
Status: DISCONTINUED | OUTPATIENT
Start: 2020-03-19 | End: 2020-03-19 | Stop reason: HOSPADM

## 2020-03-18 RX ORDER — POTASSIUM CHLORIDE 7.45 MG/ML
10 INJECTION INTRAVENOUS
Status: DISCONTINUED | OUTPATIENT
Start: 2020-03-18 | End: 2020-03-19 | Stop reason: HOSPADM

## 2020-03-18 RX ORDER — AMOXICILLIN 250 MG
1 CAPSULE ORAL 2 TIMES DAILY
Status: DISCONTINUED | OUTPATIENT
Start: 2020-03-18 | End: 2020-03-19 | Stop reason: HOSPADM

## 2020-03-18 RX ORDER — IOPAMIDOL 755 MG/ML
70 INJECTION, SOLUTION INTRAVASCULAR ONCE
Status: COMPLETED | OUTPATIENT
Start: 2020-03-18 | End: 2020-03-18

## 2020-03-18 RX ORDER — GADOBUTROL 604.72 MG/ML
9 INJECTION INTRAVENOUS ONCE
Status: COMPLETED | OUTPATIENT
Start: 2020-03-18 | End: 2020-03-18

## 2020-03-18 RX ORDER — POLYETHYLENE GLYCOL 3350 17 G/17G
17 POWDER, FOR SOLUTION ORAL DAILY PRN
Status: DISCONTINUED | OUTPATIENT
Start: 2020-03-18 | End: 2020-03-19 | Stop reason: HOSPADM

## 2020-03-18 RX ORDER — TRAZODONE HYDROCHLORIDE 50 MG/1
50 TABLET, FILM COATED ORAL AT BEDTIME
Status: DISCONTINUED | OUTPATIENT
Start: 2020-03-18 | End: 2020-03-19 | Stop reason: HOSPADM

## 2020-03-18 RX ORDER — HYDROCHLOROTHIAZIDE 12.5 MG/1
12.5 TABLET ORAL AT BEDTIME
COMMUNITY
End: 2020-03-24

## 2020-03-18 RX ORDER — ACETAMINOPHEN 650 MG/1
650 SUPPOSITORY RECTAL EVERY 4 HOURS PRN
Status: DISCONTINUED | OUTPATIENT
Start: 2020-03-18 | End: 2020-03-19 | Stop reason: HOSPADM

## 2020-03-18 RX ORDER — BISACODYL 5 MG
15 TABLET, DELAYED RELEASE (ENTERIC COATED) ORAL DAILY PRN
Status: DISCONTINUED | OUTPATIENT
Start: 2020-03-18 | End: 2020-03-19 | Stop reason: HOSPADM

## 2020-03-18 RX ORDER — BISACODYL 10 MG
10 SUPPOSITORY, RECTAL RECTAL DAILY PRN
Status: DISCONTINUED | OUTPATIENT
Start: 2020-03-18 | End: 2020-03-19 | Stop reason: HOSPADM

## 2020-03-18 RX ORDER — FLUTICASONE PROPIONATE 50 MCG
1-2 SPRAY, SUSPENSION (ML) NASAL DAILY PRN
COMMUNITY

## 2020-03-18 RX ORDER — POTASSIUM CHLORIDE 1500 MG/1
20-40 TABLET, EXTENDED RELEASE ORAL
Status: DISCONTINUED | OUTPATIENT
Start: 2020-03-18 | End: 2020-03-19 | Stop reason: HOSPADM

## 2020-03-18 RX ORDER — POTASSIUM CL/LIDO/0.9 % NACL 10MEQ/0.1L
10 INTRAVENOUS SOLUTION, PIGGYBACK (ML) INTRAVENOUS
Status: DISCONTINUED | OUTPATIENT
Start: 2020-03-18 | End: 2020-03-19 | Stop reason: HOSPADM

## 2020-03-18 RX ORDER — ACETAMINOPHEN 325 MG/1
650 TABLET ORAL EVERY 4 HOURS PRN
Status: DISCONTINUED | OUTPATIENT
Start: 2020-03-18 | End: 2020-03-19 | Stop reason: HOSPADM

## 2020-03-18 RX ORDER — BISACODYL 5 MG
5 TABLET, DELAYED RELEASE (ENTERIC COATED) ORAL DAILY PRN
Status: DISCONTINUED | OUTPATIENT
Start: 2020-03-18 | End: 2020-03-19 | Stop reason: HOSPADM

## 2020-03-18 RX ORDER — SERTRALINE HYDROCHLORIDE 100 MG/1
200 TABLET, FILM COATED ORAL AT BEDTIME
COMMUNITY
End: 2021-02-03

## 2020-03-18 RX ORDER — ALBUTEROL SULFATE 0.83 MG/ML
2.5 SOLUTION RESPIRATORY (INHALATION) EVERY 4 HOURS PRN
Status: DISCONTINUED | OUTPATIENT
Start: 2020-03-18 | End: 2020-03-19 | Stop reason: HOSPADM

## 2020-03-18 RX ORDER — ASPIRIN 325 MG
325 TABLET ORAL DAILY
Status: DISCONTINUED | OUTPATIENT
Start: 2020-03-18 | End: 2020-03-18

## 2020-03-18 RX ORDER — CLOPIDOGREL 300 MG/1
300 TABLET, FILM COATED ORAL ONCE
Status: COMPLETED | OUTPATIENT
Start: 2020-03-18 | End: 2020-03-18

## 2020-03-18 RX ORDER — ONDANSETRON 4 MG/1
4 TABLET, ORALLY DISINTEGRATING ORAL EVERY 6 HOURS PRN
Status: DISCONTINUED | OUTPATIENT
Start: 2020-03-18 | End: 2020-03-19 | Stop reason: HOSPADM

## 2020-03-18 RX ORDER — ATORVASTATIN CALCIUM 80 MG/1
80 TABLET, FILM COATED ORAL
Status: DISCONTINUED | OUTPATIENT
Start: 2020-03-18 | End: 2020-03-19 | Stop reason: HOSPADM

## 2020-03-18 RX ORDER — PROCHLORPERAZINE MALEATE 10 MG
10 TABLET ORAL EVERY 6 HOURS PRN
Status: DISCONTINUED | OUTPATIENT
Start: 2020-03-18 | End: 2020-03-19 | Stop reason: HOSPADM

## 2020-03-18 RX ORDER — POTASSIUM CHLORIDE 29.8 MG/ML
20 INJECTION INTRAVENOUS
Status: DISCONTINUED | OUTPATIENT
Start: 2020-03-18 | End: 2020-03-19 | Stop reason: HOSPADM

## 2020-03-18 RX ORDER — POTASSIUM CHLORIDE 1.5 G/1.58G
20-40 POWDER, FOR SOLUTION ORAL
Status: DISCONTINUED | OUTPATIENT
Start: 2020-03-18 | End: 2020-03-19 | Stop reason: HOSPADM

## 2020-03-18 RX ORDER — BISACODYL 5 MG
10 TABLET, DELAYED RELEASE (ENTERIC COATED) ORAL DAILY PRN
Status: DISCONTINUED | OUTPATIENT
Start: 2020-03-18 | End: 2020-03-19 | Stop reason: HOSPADM

## 2020-03-18 RX ORDER — OXYCODONE HYDROCHLORIDE 5 MG/1
5-10 TABLET ORAL
Status: DISCONTINUED | OUTPATIENT
Start: 2020-03-18 | End: 2020-03-19 | Stop reason: HOSPADM

## 2020-03-18 RX ORDER — LIDOCAINE 40 MG/G
CREAM TOPICAL
Status: DISCONTINUED | OUTPATIENT
Start: 2020-03-18 | End: 2020-03-19 | Stop reason: HOSPADM

## 2020-03-18 RX ORDER — CLOPIDOGREL BISULFATE 75 MG/1
75 TABLET ORAL DAILY
Status: DISCONTINUED | OUTPATIENT
Start: 2020-03-19 | End: 2020-03-19 | Stop reason: HOSPADM

## 2020-03-18 RX ORDER — SERTRALINE HYDROCHLORIDE 100 MG/1
200 TABLET, FILM COATED ORAL AT BEDTIME
Status: DISCONTINUED | OUTPATIENT
Start: 2020-03-18 | End: 2020-03-19 | Stop reason: HOSPADM

## 2020-03-18 RX ORDER — HYDROMORPHONE HYDROCHLORIDE 1 MG/ML
0.2 INJECTION, SOLUTION INTRAMUSCULAR; INTRAVENOUS; SUBCUTANEOUS
Status: DISCONTINUED | OUTPATIENT
Start: 2020-03-18 | End: 2020-03-19 | Stop reason: HOSPADM

## 2020-03-18 RX ORDER — LABETALOL HYDROCHLORIDE 5 MG/ML
10-40 INJECTION, SOLUTION INTRAVENOUS EVERY 10 MIN PRN
Status: DISCONTINUED | OUTPATIENT
Start: 2020-03-18 | End: 2020-03-19 | Stop reason: HOSPADM

## 2020-03-18 RX ORDER — TRAZODONE HYDROCHLORIDE 50 MG/1
50 TABLET, FILM COATED ORAL AT BEDTIME
COMMUNITY
End: 2021-02-03

## 2020-03-18 RX ORDER — SODIUM CHLORIDE AND POTASSIUM CHLORIDE 150; 900 MG/100ML; MG/100ML
INJECTION, SOLUTION INTRAVENOUS CONTINUOUS
Status: DISCONTINUED | OUTPATIENT
Start: 2020-03-18 | End: 2020-03-19 | Stop reason: HOSPADM

## 2020-03-18 RX ORDER — HYDROXYZINE PAMOATE 50 MG/1
50 CAPSULE ORAL AT BEDTIME
COMMUNITY
End: 2021-08-05

## 2020-03-18 RX ORDER — LOSARTAN POTASSIUM 100 MG/1
100 TABLET ORAL AT BEDTIME
COMMUNITY
End: 2020-08-11

## 2020-03-18 RX ORDER — PROCHLORPERAZINE 25 MG
25 SUPPOSITORY, RECTAL RECTAL EVERY 12 HOURS PRN
Status: DISCONTINUED | OUTPATIENT
Start: 2020-03-18 | End: 2020-03-19 | Stop reason: HOSPADM

## 2020-03-18 RX ORDER — IOPAMIDOL 755 MG/ML
120 INJECTION, SOLUTION INTRAVASCULAR ONCE
Status: DISCONTINUED | OUTPATIENT
Start: 2020-03-18 | End: 2020-03-18

## 2020-03-18 RX ORDER — MAGNESIUM SULFATE HEPTAHYDRATE 40 MG/ML
4 INJECTION, SOLUTION INTRAVENOUS EVERY 4 HOURS PRN
Status: DISCONTINUED | OUTPATIENT
Start: 2020-03-18 | End: 2020-03-19 | Stop reason: HOSPADM

## 2020-03-18 RX ADMIN — IOPAMIDOL 70 ML: 755 INJECTION, SOLUTION INTRAVENOUS at 19:16

## 2020-03-18 RX ADMIN — POTASSIUM CHLORIDE AND SODIUM CHLORIDE: 900; 150 INJECTION, SOLUTION INTRAVENOUS at 16:49

## 2020-03-18 RX ADMIN — ASPIRIN 325 MG ORAL TABLET 325 MG: 325 PILL ORAL at 13:22

## 2020-03-18 RX ADMIN — Medication 10 MEQ: at 19:54

## 2020-03-18 RX ADMIN — Medication 10 MEQ: at 23:20

## 2020-03-18 RX ADMIN — Medication 10 MEQ: at 22:09

## 2020-03-18 RX ADMIN — SODIUM CHLORIDE 90 ML: 9 INJECTION, SOLUTION INTRAVENOUS at 19:16

## 2020-03-18 RX ADMIN — Medication 10 MEQ: at 20:59

## 2020-03-18 RX ADMIN — GADOBUTROL 9 ML: 604.72 INJECTION INTRAVENOUS at 11:33

## 2020-03-18 RX ADMIN — CLOPIDOGREL BISULFATE 300 MG: 300 TABLET, FILM COATED ORAL at 13:22

## 2020-03-18 SDOH — HEALTH STABILITY: MENTAL HEALTH: HOW OFTEN DO YOU HAVE 6 OR MORE DRINKS ON ONE OCCASION?: NEVER

## 2020-03-18 SDOH — HEALTH STABILITY: MENTAL HEALTH: HOW OFTEN DO YOU HAVE A DRINK CONTAINING ALCOHOL?: MONTHLY OR LESS

## 2020-03-18 SDOH — HEALTH STABILITY: MENTAL HEALTH: HOW MANY STANDARD DRINKS CONTAINING ALCOHOL DO YOU HAVE ON A TYPICAL DAY?: 1 OR 2

## 2020-03-18 ASSESSMENT — MIFFLIN-ST. JEOR: SCORE: 1546.62

## 2020-03-18 ASSESSMENT — ACTIVITIES OF DAILY LIVING (ADL)
NUMBER_OF_TIMES_PATIENT_HAS_FALLEN_WITHIN_LAST_SIX_MONTHS: 1
COGNITION: 0 - NO COGNITION ISSUES REPORTED
SWALLOWING: 0-->SWALLOWS FOODS/LIQUIDS WITHOUT DIFFICULTY
RETIRED_EATING: 0-->INDEPENDENT
AMBULATION: 0-->INDEPENDENT
TRANSFERRING: 0-->INDEPENDENT
DRESS: 0-->INDEPENDENT
TOILETING: 0-->INDEPENDENT
ADLS_ACUITY_SCORE: 9
RETIRED_COMMUNICATION: 0-->UNDERSTANDS/COMMUNICATES WITHOUT DIFFICULTY
FALL_HISTORY_WITHIN_LAST_SIX_MONTHS: YES
BATHING: 0-->INDEPENDENT

## 2020-03-18 ASSESSMENT — ENCOUNTER SYMPTOMS
NUMBNESS: 1
SPEECH DIFFICULTY: 0
HEADACHES: 0
LIGHT-HEADEDNESS: 0
WEAKNESS: 1

## 2020-03-18 NOTE — ED NOTES
"[facility]  ED Nurse Handoff Report    ED Chief complaint: One-sided Weakness      ED Diagnosis:   Final diagnoses:   Acute ischemic stroke (H)       Code Status: Full Code    Allergies:   Allergies   Allergen Reactions     Seasonal Allergies        Patient Story: pt had numb left foot last night this morning getting out of bed fell to the floor notice she was weak on the left side  Focused Assessment:  Left sided weakness arm and leg pt alert and oriented     Treatments and/or interventions provided: asa and plavix given  Patient's response to treatments and/or interventions:     To be done/followed up on inpatient unit:      Does this patient have any cognitive concerns?:     Activity level - Baseline/Home:  Independent  Activity Level - Current:   Stand with Assist    Patient's Preferred language: English   Needed?: No    Isolation: None  Infection: Not Applicable  Bariatric?: No    Vital Signs:   Vitals:    03/18/20 0953 03/18/20 1000   BP: (!) 185/97 111/88   Pulse: 99 85   Resp: 18 18   Temp: 98.3  F (36.8  C)    TempSrc: Oral    SpO2: 99% 99%   Weight: 93 kg (205 lb)    Height: 1.676 m (5' 6\")        Cardiac Rhythm:     Was the PSS-3 completed:   Yes  What interventions are required if any?               Family Comments: pt keeping family updted  OBS brochure/video discussed/provided to patient/family: N/A              Name of person given brochure if not patient:               Relationship to patient:     For the majority of the shift this patient's behavior was Green.   Behavioral interventions performed were .    ED NURSE PHONE NUMBER: [phone]       "

## 2020-03-18 NOTE — PHARMACY-ADMISSION MEDICATION HISTORY
Pharmacy Medication History  Admission medication history interview status for the 3/18/2020  admission is complete. See EPIC admission navigator for prior to admission medications     Medication history sources: Care Everywhere and patient and sure scripts   Medication history source reliability: Moderate  Adherence assessment: Moderate    Significant changes made to the medication list:  Buspirone changed to hydroxyzine     Not taking montelukast     Inhaler prn       Additional medication history information:     Medication reconciliation completed by provider prior to medication history? No    Time spent in this activity: 20min       Prior to Admission medications    Medication Sig Last Dose Taking? Auth Provider   fluticasone (FLONASE) 50 MCG/ACT nasal spray Spray 1-2 sprays into both nostrils daily as needed for rhinitis or allergies Past Week at Unknown time Yes Unknown, Entered By History   hydrochlorothiazide (HYDRODIURIL) 12.5 MG tablet Take 12.5 mg by mouth At Bedtime  3/17/2020 at Unknown time Yes Unknown, Entered By History   hydrOXYzine (VISTARIL) 50 MG capsule Take 50 mg by mouth At Bedtime 3/17/2020 at Unknown time Yes Unknown, Entered By History   losartan (COZAAR) 100 MG tablet Take 100 mg by mouth At Bedtime 3/17/2020 at Unknown time Yes Unknown, Entered By History   sertraline (ZOLOFT) 100 MG tablet Take 200 mg by mouth At Bedtime 3/17/2020 at Unknown time Yes Unknown, Entered By History   traZODone (DESYREL) 50 MG tablet Take 50 mg by mouth At Bedtime 3/17/2020 at Unknown time Yes Unknown, Entered By History   albuterol (PROAIR HFA/PROVENTIL HFA/VENTOLIN HFA) 108 (90 Base) MCG/ACT inhaler INHALE 2 PUFFS INTO THE LUNGS EVERY 4 HOURS AS NEEDED More than a month at Unknown time  Lorrie Macias PA-C   fluticasone-salmeterol (WIXELA INHUB) 100-50 MCG/DOSE inhaler Inhale 1 puff into the lungs 2 times daily as needed More than a month at Unknown time  Unknown, Entered By History

## 2020-03-18 NOTE — PROGRESS NOTES
RECEIVING UNIT ED HANDOFF REVIEW    ED Nurse Handoff Report was reviewed by: Nai Marshall RN on March 18, 2020 at 3:58 PM

## 2020-03-18 NOTE — ED TRIAGE NOTES
10 pm last night felt like left foot fell asleep, in the middle of the night pt got up to go to the bathroom and fell and this am states her left side is becoming increasingly numb

## 2020-03-18 NOTE — H&P
[facility]    History and Physical - Hospitalist Service       Date of Admission:  3/18/2020    Assessment & Plan   Trini Ramirez is a 55 year old female with PMH significant for hypertension, hyperlipidemia, mild intermittent asthma, and depression who was admitted to [facility] on 3/18/20 with complaints of worsening left-sided weakness and paresthesias since last night and mild aphasia, found to have acute stroke. Did not receive tPA due to last known normal outside of window.     Acute right-sided stroke  Left foot paresthesias night prior to admission progressing to left upper extremity and very mild aphasia.  Came to the ED next morning, was out of window for TPA.  Code stroke called.  EKG without obvious ischemic findings.  Brain MRA revealed acute infarct in region of right thalamus/posterior limb of internal capsule.  Findings of possible chronic lacunar infarct are or additional acute infarct are unclear at the region of the Lt basal ganglia. On admission she has mild paresthesias on the Lt which are improving and mild aphasia.  Recent lipid panel as below.  Recent TSH 3.85.  No self history of stroke.  Father had a stroke at age 85, hypertension and hyperlipidemia run in her family as well as the patient.  She is a never smoker.  No history of A. fib.  Not on anticoagulants.  - Inpatient admission due to +acute stroke  - Stroke order set, Stroke Neuro consultation  - Telemetry, hold PTA antihypertensives, PRN labetalol for SBP > 220 with hpermissive hypertension  - ASA 81mg/d, clopidogrel 75mg/d - loaded in the ED   - PT/OT/SLP consultations, stroke education  - Obtain ECHO with bubble study and CTA H&N  - Check and replete lytes PRN  - Check Troponin, liver tests though recently normal, HbA1c    Hypertension  Hyperlipidemia  Lipid panel 2/14/2020: , HDL 37, , .  Been seeing primary care for blood pressure and lipid management.  No history of statin use.  Patient has been  "attempting to diet control her hyperlipidemia.  - Start the patient on atorvastatin 80 mg nightly, Neuro would like recheck of lipid panel  - Hold PTA losartan 100 mg daily and hydrochlorothiazide 12.5 mg daily to pursue permissive hypertension in setting of acute stroke    Mild intermittent asthma: PRN albuterol available, patient states she has not taken any inhalers in quite a long period of time.    Depression  PTA sertraline 100 mg daily, and trazodone  mg nightly for sleep.  - Resume PTA meds as above  - Hold PTA hydroxyzine 50mg nightly to decrease sedation and drowsiness in setting of stroke    Diet: NPO for Medical/Clinical Reasons Except for: No Exceptions    DVT Prophylaxis: Pneumatic Compression Devices and Ambulate every shift  Alvarez Catheter: not present  Code Status: Full, discussed with patient on admission    Disposition Plan   Expected discharge: 2 - 3 days, recommended to Home versus TCU once safe disposition plan/ TCU bed available and Stroke work-up complete, cleared by stroke neurology..  Entered: Gogo Deleon PA-C 03/18/2020, 3:25 PM     The patient's care was discussed with the Attending Physician, Dr. Dunbar, Patient and ER provider.    Gogo Deleon PA-C ([place])  Hospitalist MARINA  [facility]    ______________________________________________________________________    Chief Complaint   Left-sided weakness and paresthesias    History is obtained from the patient, electronic health record and emergency department physician    History of Present Illness   Trini Ramirez is a 55 year old female with PMH significant for hypertension, hyperlipidemia, mild intermittent asthma, and depression who presented to the emergency department with complaints of worsening left-sided weakness and paresthesias since last night.  Patient reports she is in a general state of health until 2200 last night when she noticed her left foot tingling and numb feeling like it \"fell " "asleep\".  She did not think much of it and went to bed however woke up in the middle of the night and when she lifted her left leg out of bed to walk to the bathroom she fell on her buttocks.  She denies hitting her head.  She was able to ambulate to the bathroom but felt her left foot was still asleep.  This morning she woke up and felt her left side had increasing numbness causing some weakness.  She states her speech is slightly changed from usual.  No history of stroke.  No nausea or vomiting.  Otherwise feeling well.  States her paresthesias are mildly improving.  She takes 2 blood pressure meds at home and states her blood pressure has been relatively controlled.  She has a history of hyperlipidemia with last LDL noted 180s and was attempting diet and lifestyle changes, no prior use of statins.    In the ER, she was hemodynamically stable and a code stroke was immediately called.  EKG normal sinus rhythm at 82 bpm with no obvious ST-T changes however difficult to discern given possible artifact.  MRI consistent with acute right-sided infarct region of the thalamus/posterior limb of internal capsule.  No hemorrhagic transformation.  No S of mass-effect or midline shift.  Subtle restricted diffusion of the left basal ganglia.  It is unclear whether this represents additional acute infarct or region of T2 with chronic lacunar infarct. Basic labs are done significant for mild hypokalemia which was replaced in the emergency department.  The patient was seen by stroke neurology and started on aspirin, loaded with Plavix.  tPA was not pursued given the patient was about of window for timing.    Review of Systems    The 10 point Review of Systems is negative other than noted in the HPI or here.     Past Medical History    I have reviewed this patient's medical history and updated it with pertinent information if needed.   Past Medical History:   Diagnosis Date     Allergic rhinitis      Broken wrist 2/10/2016    left " wrist     Depressive disorder      Fracture of fibula 2015     Hypertension      PONV (postoperative nausea and vomiting)      Uncomplicated asthma        Past Surgical History   I have reviewed this patient's surgical history and updated it with pertinent information if needed.  Past Surgical History:   Procedure Laterality Date     AS TMJ ARTHROSCOPY/SURGERY        SECTION       OPEN REDUCTION INTERNAL FIXATION WRIST Left 2016    Procedure: OPEN REDUCTION INTERNAL FIXATION WRIST;  Surgeon: Fritz Reid MD;  Location:  OR       Social History   I have reviewed this patient's social history and updated it with pertinent information if needed.  Patient is a high school counselor.  Social History     Tobacco Use     Smoking status: Never Smoker     Smokeless tobacco: Never Used   Substance Use Topics     Alcohol use: Yes     Alcohol/week: 0.0 standard drinks     Frequency: Monthly or less     Drinks per session: 1 or 2     Binge frequency: Never     Comment: occ     Drug use: No       Family History   I have reviewed this patient's family history and updated it with pertinent information if needed.   Family History   Problem Relation Age of Onset     Depression Mother      Hypertension Father      Depression Father      Substance Abuse Father      Cerebrovascular Disease Father 85     Diabetes Maternal Grandfather      Hypertension Brother      Depression Brother      Substance Abuse Brother      Mental Illness Brother        Prior to Admission Medications   Prior to Admission Medications   Prescriptions Last Dose Informant Patient Reported? Taking?   albuterol (PROAIR HFA/PROVENTIL HFA/VENTOLIN HFA) 108 (90 Base) MCG/ACT inhaler More than a month at Unknown time Self No No   Sig: INHALE 2 PUFFS INTO THE LUNGS EVERY 4 HOURS AS NEEDED   fluticasone (FLONASE) 50 MCG/ACT nasal spray Past Week at Unknown time Self Yes Yes   Sig: Spray 1-2 sprays into both nostrils daily as needed for rhinitis  or allergies   fluticasone-salmeterol (WIXELA INHUB) 100-50 MCG/DOSE inhaler More than a month at Unknown time Self Yes No   Sig: Inhale 1 puff into the lungs 2 times daily as needed   hydrOXYzine (VISTARIL) 50 MG capsule 3/17/2020 at Unknown time Self Yes Yes   Sig: Take 50 mg by mouth At Bedtime   hydrochlorothiazide (HYDRODIURIL) 12.5 MG tablet 3/17/2020 at Unknown time Self Yes Yes   Sig: Take 12.5 mg by mouth At Bedtime    losartan (COZAAR) 100 MG tablet 3/17/2020 at Unknown time Self Yes Yes   Sig: Take 100 mg by mouth At Bedtime   sertraline (ZOLOFT) 100 MG tablet 3/17/2020 at Unknown time Self Yes Yes   Sig: Take 200 mg by mouth At Bedtime   traZODone (DESYREL) 50 MG tablet 3/17/2020 at Unknown time Self Yes Yes   Sig: Take 50 mg by mouth At Bedtime      Facility-Administered Medications: None     Allergies   Allergies   Allergen Reactions     Seasonal Allergies        Physical Exam   Vital Signs: Temp: 98.3  F (36.8  C) Temp src: Oral BP: (!) 160/113 Pulse: 80   Resp: 18 SpO2: 98 % O2 Device: None (Room air)    Weight: 205 lbs 0 oz    General: Awake, alert, middle aged woman who appears stated age. Looks comfortable sitting up in bed. No acute distress.  HEENT: Normocephalic, atraumatic. Extraocular movements intact.   Respiratory: Clear to auscultation bilaterally, no rales, wheezing, or rhonchi.  Cardiovascular: Regular rate and rhythm, +S1 and S2, no murmur auscultated. No peripheral edema.   Gastrointestinal: Soft, non-tender, non-distended. Bowel sounds present.  Skin: Warm, dry. No obvious rashes or lesions on exposed skin. Dorsalis pedis pulses palpable bilaterally.  Musculoskeletal: No joint swelling, erythema or tenderness. Moves all extremities equally.  Neurologic: AAO x3. Cranial nerves 2-12 grossly intact, normal strength and sensation. Slight LUE drift. Very mild aphasia.  Psychiatric: Appropriate mood and affect. No obvious anxiety or depression.    Data   Data reviewed today: I reviewed all  medications, new labs and imaging results over the last 24 hours. I personally reviewed the EKG tracing showing see HPI.    Recent Labs   Lab 03/18/20  1003   WBC 6.8   HGB 15.7   MCV 78      INR 1.08      POTASSIUM 3.2*   CHLORIDE 106   CO2 26   BUN 14   CR 0.92   ANIONGAP 5   VAL[place] 9.1   *     Recent Results (from the past 24 hour(s))   MR Brain w/o & w Contrast    Narrative    MRI BRAIN WITHOUT AND WITH CONTRAST  3/18/2020 12:25 PM     HISTORY: Left foot and arm weakness, paresthesias.     TECHNIQUE: Multiplanar, multisequence MRI of the brain without and  with 9 mL Gadavist.      COMPARISON: None.     FINDINGS: Area of restricted diffusion and T2 hyperintensity in the  region of the right thalamus and posterior limb of the internal  capsule measuring 1 cm. This is concerning for an acute area of  infarct. No evidence of hemorrhagic transformation. No mass effect or  midline shift. Subtle small 3 mm region of restricted diffusion on the  left basal ganglia (series 5 image 84). Ventricular size is within  normal limits without evidence of hydrocephalus. No abnormal extra  axial fluid collection. Mild patchy periventricular white matter T2  hyperintensities are nonspecific, but likely related to chronic  microvascular ischemic disease. T2 hyperintensities in the basal  ganglia may be due to chronic lacunar infarcts or dilated perivascular  spaces.    There is no abnormal intracranial enhancement.     The facial structures appear normal.       Impression    IMPRESSION:    1. Acute infarct in the region of the right thalamus/posterior limb of  the internal capsule. No evidence of hemorrhagic transformation. No  mass effect or midline shift.   2. Subtle restricted diffusion in the left basal ganglia. Unclear  whether this represents an additional acute infarct or region of T2  shine through from chronic lacunar infarct.  3. Mild nonspecific white matter changes likely due to chronic  microvascular  ischemic disease.       JONE GOMES MD

## 2020-03-18 NOTE — ED PROVIDER NOTES
"  History   Chief Complaint:  One-Sided Weakness    HPI   Trini Ramirez is a 55 year old female, with a history of hypertension, who presents to the ED for evaluation of one-sided weakness. The patient reports the onset of unilateral left leg weakness beginning at 2200 last night. She states she also had some left arm numbness, but no other focal areas of numbness. She denies any headache, lightheadedness, syncope, speech abnormalities, or any other acute symptoms. She has not had a previous stroke and she does not smoke.    Allergies:  No known drug allergies    Medications:    Albuterol  Buspar  Flonase  Hydrodiuril  Vistaril  Losartan  Singulair  Zoloft  Desyrel    Past Medical History:    Hyperlipidemia  Hypertension  Asthma  Depression    Past Surgical History:    TMJ repair   section  Left wrist fixation repair    Family History:    Depression  Hypertension  Substance abuse    Social History:  Smoking status: Never  Alcohol use: Yes  Drug use: No  PCP: Lorrie Macias  Marital Status:   [2]    Review of Systems   Neurological: Positive for weakness and numbness. Negative for syncope, speech difficulty, light-headedness and headaches.   All other systems reviewed and are negative.     Physical Exam     Patient Vitals for the past 24 hrs:   BP Temp Temp src Pulse Resp SpO2 Height Weight   20 1000 111/88 -- -- 85 18 99 % -- --   20 0953 (!) 185/97 98.3  F (36.8  C) Oral 99 18 99 % 1.676 m (5' 6\") 93 kg (205 lb)     Physical Exam  General: Well-nourished  Eyes: PERRL, conjunctivae pink no scleral icterus or conjunctival injection  ENT:  Moist mucus membranes, posterior oropharynx clear without erythema or exudates  Respiratory:  Lungs clear to auscultation bilaterally, no crackles/rubs/wheezes.  Good air movement  CV: Normal rate and rhythm, no murmurs/rubs/gallops  GI:  Abdomen soft and non-distended.  Normoactive BS.  No tenderness, guarding or rebound  Skin: Warm, dry.  No " rashes or petechiae  Musculoskeletal: No peripheral edema or calf tenderness  Neuro: Alert and oriented to person/place/time. PERRL, EOMI no nystagmus, no aphasia/facial droop/dysarthria, tongue midline, symmetric palatal elevation, slightly decreased strength in the left upper and left lower extremity.  Decreased sensation to pinprick at left upper and lower extremity.  Gait deferred.  Negative romberg.  Psychiatric: Tearful affect    Emergency Department Course   ECG (11:02:01):  Rate 82 bpm. TX interval 158. QRS duration 76. QT/QTc 378/441. P-R-T axes 14 23 73. Normal Sinus rhythm. Nonspecific T wave abnormality. Abnormal ECG. No significant change compared to EKG on 2/2/16. Interpreted at 1104 by Massiel Newberry MD.    Imaging:  Radiology findings were communicated with the patient who voiced understanding of the findings.    MR Brain without & with contrast:  1. Acute infarct in the region of the right thalamus/posterior limb of   the internal capsule. No evidence of hemorrhagic transformation. No   mass effect or midline shift.   2. Subtle restricted diffusion in the left basal ganglia. Unclear   whether this represents an additional acute infarct or region of T2   shine through from chronic lacunar infarct.   3. Mild nonspecific white matter changes likely due to chronic   microvascular ischemic disease.     Imaging independently reviewed and agree with radiologist interpretation.     Laboratory:  Laboratory findings were communicated with the patient who voiced understanding of the findings.    CBC: WNL (WBC 6.8, HGB 15.7, )    BMP: K 3.2 (L),  (H), o/w WNL (Creatinine 0.92)    PTT: 27    INR: 1.08    Interventions:  1133: Gadavist 9 mL IV  1322: Aspirin 325 mg PO  1322 Plavix 300 mg PO    Emergency Department Course:  Past medical records, nursing notes, and vitals reviewed.    1008 I performed an exam of the patient as documented above.     EKG obtained in the ED, see results above.   IV was  inserted and blood was drawn for laboratory testing, results above.  The patient was sent for a brain MRI while in the emergency department, results above.     1008    Dr. Marshall, on-call for stroke neurology, saw the patient in conjunction with me.     1009    Code stroke was deescalated.    1312    I spoke to Therese MCHUGH, on-call for stroke neurology.    1325 I rechecked the patient and discussed the results of her workup thus far.     Findings and plan explained to the Patient who consents to admission. Discussed the patient with Dr. Dunbar, who will admit the patient to a neuro bed for further monitoring, evaluation, and treatment.     I personally reviewed the laboratory and imaging results with the Patient and answered all related questions prior to admission.     Impression & Plan   CMS Diagnoses: The patient has stroke symptoms:           ED Stroke specific documentation           NIHSS PDF          Protocol PDF     Patient last known well time: 3/17/2020- 2200  ED Provider first to bedside at: 1008  MRI Results received at: 1249    tPA:   Not given due to outside the time window.    If treating with tPA: Ensure SBP<185 and DBP<105 prior to treatment with IV tPA.  Administering IV tPA after treatment with IV labetalol, hydralazine, or nicardipine is reasonable once BP control is established.    Endovascular Retrieval:  Not initiated due to absence of proximal vessel occlusion    [employer] Stroke Scale (Baseline)  Time Performed: 1008      Score    Level of consciousness: (0)   Alert, keenly responsive    LOC questions: (0)   Answers both questions correctly    LOC commands: (0)   Performs both tasks correctly    Best gaze: (0)   Normal    Visual: (1)   Partial hemianopia    Facial palsy: (0)   Normal symmetrical movements    Motor arm (left): (1)   Drift    Motor arm (right): (0)   No drift    Motor leg (left): (1)   Drift    Motor leg (right): (0)   No drift    Limb ataxia: (0)   Absent     Sensory: (1)   Mild to moderate sensory loss    Best language: (0)   Normal- no aphasia    Dysarthria: (0)   Normal    Extinction and inattention: (0)   No abnormality        Total Score:  4     Stroke Mimics were considered (including migraine headache, seizure disorder, hypoglycemia (or hyperglycemia), head or spinal trauma, CNS infection, Toxin ingestion and shock state (e.g. sepsis) .    Medical Decision Making:  Trini Ramirez is a 55 year old female who presents for evaluation of left sided paresthesias and weakness.  This is consistent with likely cerebrovascular accident.  Imaging as noted above confirms this.  Based on duration of symptoms, they are not a candidate for TPA.  She is not a candidate for endovascular retrieval.  Neurology saw and evaluated the patient and recommended both Plavix and aspirin after her MRI.  They recommended a CT angiogram of the head and neck as an inpatient.. Differential considered for symptoms included CVA, TIA, dissection, cerebral tumor, migraine, infection, metabolic derangement, demyelination, etc.  EKG shows no atrial fibrillation nor arrhythmia noted on telemetry monitoring.  Will admit to medicine with neurology consulting for further cares.      Total Critical Care time: 35 minutes for this patient excluding procedures.     Diagnosis:    ICD-10-CM    1. Acute ischemic stroke (H)  I63.9      Disposition:  Admitted to a neuro bed.    Scribe Disclosure:  Jared LAURENT, am serving as a scribe at 10:08 AM on 3/18/2020 to document services personally performed by Massiel Newberry MD based on my observations and the provider's statements to me.        Massiel Newberry MD  03/18/20 1532

## 2020-03-18 NOTE — PROGRESS NOTES
MD Notification    Notified Person: MD    Notified Person Name: JEISON Adkins    Notification Date/Time: 3/18/20 1700     Notification Interaction: Paged    Purpose of Notification: K 3.2 today. Wondering if you would like replaced?    Orders Received: Protocol ordered.    Comments:

## 2020-03-18 NOTE — CONSULTS
"  [facility]    Stroke Consult Note    Reason for Consult: Left side weakness    Chief Complaint: One-sided Weakness      HPI  Trini Ramirez is a 55 year old female with post medical history significant for HTN, HLD, and depression who presented for evaluation of left side numbness and weakness. She reports that at approximately 10 pm last night she noticed that her left leg felt as though it was asleep. On exam she also reports some left arm numbness but denies speech difficulty, vision changes, or headache.     MRI brain revealed acute infarcts in the right thalamus as well as subtle restricted diffusion in the basal ganglia. Upon review, this is favored to be shine through rather than additional acute infarct.    TPA Treatment   Not given due to outside the time window.    Endovascular Treatment  Not initiated due to absence of proximal vessel occlusion    Impression  Ischemic Stroke due to small-vessel occlusion     Recommendations  Acute Ischemic Stroke (without tPA) Recommendations  - Neurochecks Q 4 hours  - CTA head/neck   - Permissive HTN; labetalol PRN for SBP > 220  - Daily aspirin 81 mg for secondary stroke prevention  - Plavix (clopidogrel) 300 mg PO loading dose x 1 given  - Plavix (clopidogrel) 75 mg PO Daily  - Statin:  on 2/14/20. Recheck but start atorvastatin 80 mg today. Goal LDL 40-70  - TTE with Bubble Study  - Telemetry, EKG  - Bedside Glucose Monitoring  - A1c, Lipid Panel, Troponin x 3  - PT/OT/SLP  - Stroke Class per [facility]    Patient Follow-up    - final recommendation pending work-up    Thank you for this consult. We will continue to follow.     JHONNY Miles, [facility]  Neurology  03/18/2020 3:00 PM  To page stroke neurology after hours or on a subsequent day, click here: AMCOM  Choose \"On Call\" tab at top, then search dropdown box for \"Neurology Adult\" & press Enter, look for Neuro " ICU/Stroke  ______________________________________________________    Past Medical History   Past Medical History:   Diagnosis Date     Allergic rhinitis      Broken wrist 2/10/2016    left wrist     Depressive disorder      Fracture of fibula 2015     Hypertension      PONV (postoperative nausea and vomiting)      Uncomplicated asthma      Past Surgical History   Past Surgical History:   Procedure Laterality Date     AS TMJ ARTHROSCOPY/SURGERY        SECTION       OPEN REDUCTION INTERNAL FIXATION WRIST Left 2016    Procedure: OPEN REDUCTION INTERNAL FIXATION WRIST;  Surgeon: Fritz Reid MD;  Location:  OR     Medications   Home Meds  Prior to Admission medications    Medication Sig Start Date End Date Taking? Authorizing Provider   albuterol (2.5 MG/3ML) 0.083% neb solution Take 1 vial (2.5 mg) by nebulization once for 1 dose 17  Crissy Mejia MD   albuterol (PROAIR HFA/PROVENTIL HFA/VENTOLIN HFA) 108 (90 Base) MCG/ACT inhaler INHALE 2 PUFFS INTO THE LUNGS EVERY 4 HOURS AS NEEDED 3/19/19   Lorrie Macias PA-C   busPIRone (BUSPAR) 10 MG tablet Take 1 tablet (10 mg) by mouth 2 times daily 1/3/19   Lorrie Macias PA-C   CLINDAMYCIN HCL PO Take 300 mg by mouth 4 times daily    Reported, Patient   fluticasone (FLONASE) 50 MCG/ACT nasal spray Spray 1-2 sprays into both nostrils daily 13   Nolvia Boles NP   hydrochlorothiazide (HYDRODIURIL) 12.5 MG tablet Take 1 tablet (12.5 mg) by mouth daily 20   Lorrie Macias PA-C   hydrOXYzine (VISTARIL) 50 MG capsule Take 1 capsule (50 mg) by mouth 3 times daily as needed for itching 20   Lorrie Macias PA-C   losartan (COZAAR) 100 MG tablet TAKE 1 TABLET(100 MG) BY MOUTH DAILY 20   Plosser, Lorrie Balbuena, PA-C   montelukast (SINGULAIR) 10 MG tablet Take 1 tablet (10 mg) by mouth At Bedtime 17   Lorrie Macias PA-C   sertraline (ZOLOFT) 100 MG  tablet TAKE 2 TABLETS(200 MG) BY MOUTH DAILY 2/14/20   Lorrie Macias PA-C   traZODone (DESYREL) 50 MG tablet Take 1-2 tablets ( mg) by mouth At Bedtime 2/14/20   Lorrie Macias PA-C   WIXELA INHUB 100-50 MCG/DOSE inhaler INHALE 1 PUFF INTO THE LUNGS TWICE DAILY 3/19/19   Lorrie Macias PA-C       Scheduled Meds    sodium chloride 0.9%  500 mL Intravenous Once     sodium chloride 0.9 %  100 mL Intravenous Once     aspirin  325 mg Oral Daily     clopidogrel  300 mg Oral Once     iopamidol  120 mL Intravenous Once       Infusion Meds      PRN Meds      Allergies   Allergies   Allergen Reactions     Seasonal Allergies      Family History   Family History   Problem Relation Age of Onset     Depression Mother      Hypertension Father      Depression Father      Substance Abuse Father      Depression Brother      Diabetes Maternal Grandfather      Substance Abuse Brother      Hypertension Brother      Hypertension Brother      Depression Brother      Substance Abuse Brother      Mental Illness Brother      Social History   Social History     Tobacco Use     Smoking status: Never Smoker     Smokeless tobacco: Never Used   Substance Use Topics     Alcohol use: Yes     Alcohol/week: 0.0 standard drinks     Comment: occ     Drug use: No       Review of Systems   The 10 point Review of Systems is negative other than noted in the HPI or here.        PHYSICAL EXAMINATION  Temp:  [98.3  F (36.8  C)] 98.3  F (36.8  C)  Pulse:  [85-99] 85  Resp:  [18] 18  BP: (111-185)/(88-97) 111/88  SpO2:  [99 %] 99 %     Neurologic  Mental Status:  alert, oriented x 3, follows commands, speech clear and fluent   Cranial Nerves:  EOMI with normal smooth pursuit, facial movements symmetric, hearing not formally tested but intact to conversation, no dysarthria, tongue protrusion midline  Motor:  normal muscle tone and bulk, no abnormal movements, able to move all limbs spontaneously, BRANDY/LLE with mild drift but  doesn't hit bed  Reflexes:  not assessed  Sensory:  no extinction on double simultaneous stimulation , decreased sensation to left hemibody  Coordination:  no obvious ataxia  Station/Gait:  deferred      Dysphagia Screen  Per Nursing    Stroke Scales    NIHSS  Interval     Interval Comments     1a. Level of Consciousness 0-->Alert, keenly responsive   1b. LOC Questions 0-->Answers both questions correctly   1c. LOC Commands 0-->Performs both tasks correctly   2.   Best Gaze 0-->Normal   3.   Visual 0-->No visual loss   4.   Facial Palsy 0-->Normal symmetrical movements   5a. Motor Arm, Left 1-->Drift, limb holds 90 (or 45) degrees, but drifts down before full 10 seconds, does not hit bed or other support   5b. Motor Arm, Right 0-->No drift, limb holds 90 (or 45) degrees for full 10 secs   6a. Motor Leg, Left 1-->Drift, leg falls by the end of the 5-sec period but does not hit bed   6b. Motor Leg, right 0-->No drift, leg holds 30 degree position for full 5 secs   7.   Limb Ataxia 0-->Absent   8.   Sensory 1-->Mild-to-moderate sensory loss, patient feels pinprick is less sharp or is dull on the affected side, or there is a loss of superficial pain with pinprick, but patient is aware of being touched   9.   Best Language 0-->No aphasia, normal   10. Dysarthria 0-->Normal   11. Extinction and Inattention  0-->No abnormality   Total 3 (03/18/20 1506)       Imaging  I personally reviewed all imaging; relevant findings per HPI.     Lab Results Data   CBC  Recent Labs   Lab 03/18/20  1003   WBC 6.8   RBC 5.87*   HGB 15.7   HCT 45.6        Basic Metabolic Panel    Recent Labs   Lab 03/18/20  1003      POTASSIUM 3.2*   CHLORIDE 106   CO2 26   BUN 14   CR 0.92   *   VALENT[place] 9.1     Liver Panel  Recent Labs   Lab Test 02/14/20  1527 01/03/19  0941   PROTTOTAL 7.5 7.7   ALBUMIN 3.8 4.0   BILITOTAL 0.4 0.5   ALKPHOS 105 99   AST 15 17   ALT 34 37     INR  Recent Labs   Lab Test 03/18/20  1003   INR 1.08      Lipid  Profile  Recent Labs   Lab Test 02/14/20  1527 01/03/19  0941 02/12/16  0808   CHOL 249* 257* 223*   HDL 37* 46* 44*   * 183* 144*   TRIG 158* 139 177*     A1CNo lab results found.  Troponin Vj results for input(s): TROPI in the last 168 hours.       Stroke Code / Stroke Consult Data Data   Stroke Code Data  (for stroke code without tele)  Stroke code activated 03/18/20   1001   First stroke provider response 03/18/20   1002   Last known normal 03/17/20   2145   Time of discovery   (or onset of symptoms) 03/17/20   2200   Head CT read by me (Stroke code de-escalated and patient taken to MRI.)       Was stroke code de-escalated? Yes 03/18/20 1012  patient is outside emergent treatment time parameters         I have personally spent a total of 30 minutes providing care supervising this patient's stroke code activation.  I personally reviewed all lab values and radiology images.

## 2020-03-18 NOTE — PLAN OF CARE
Pt arrived to unit from ED at 1600. A&O x4. VSS on RA. Denies pain. Neuro intact-left sided weakness, slurred speech, slow finger to nose on left. Bedside swallow failed. NPO until SLP sees. PIV to right AC infiltrated. New PIV to left forearm SL. CMS intact. Up with SBA. K 3.2 on admit. Replacement protocol ordered. LS clear. Tele NSR. BS active, + Flatus. LBM yesterday per pt. Voiding adequately in BR. Family updated via TC. Pt scoring green on the Aggression Stop Light Tool.  Plan for CT this evening.

## 2020-03-19 ENCOUNTER — APPOINTMENT (OUTPATIENT)
Dept: CARDIOLOGY | Facility: CLINIC | Age: 55
DRG: 066 | End: 2020-03-19
Attending: PHYSICIAN ASSISTANT
Payer: COMMERCIAL

## 2020-03-19 ENCOUNTER — APPOINTMENT (OUTPATIENT)
Dept: SPEECH THERAPY | Facility: CLINIC | Age: 55
DRG: 066 | End: 2020-03-19
Attending: PHYSICIAN ASSISTANT
Payer: COMMERCIAL

## 2020-03-19 ENCOUNTER — APPOINTMENT (OUTPATIENT)
Dept: OCCUPATIONAL THERAPY | Facility: CLINIC | Age: 55
DRG: 066 | End: 2020-03-19
Attending: PHYSICIAN ASSISTANT
Payer: COMMERCIAL

## 2020-03-19 ENCOUNTER — APPOINTMENT (OUTPATIENT)
Dept: PHYSICAL THERAPY | Facility: CLINIC | Age: 55
DRG: 066 | End: 2020-03-19
Attending: PHYSICIAN ASSISTANT
Payer: COMMERCIAL

## 2020-03-19 VITALS
SYSTOLIC BLOOD PRESSURE: 171 MMHG | OXYGEN SATURATION: 95 % | RESPIRATION RATE: 18 BRPM | HEIGHT: 66 IN | BODY MASS INDEX: 32.95 KG/M2 | HEART RATE: 94 BPM | WEIGHT: 205 LBS | DIASTOLIC BLOOD PRESSURE: 104 MMHG | TEMPERATURE: 98 F

## 2020-03-19 LAB
ALBUMIN SERPL-MCNC: 3.4 G/DL (ref 3.4–5)
ALP SERPL-CCNC: 89 U/L (ref 40–150)
ALT SERPL W P-5'-P-CCNC: 36 U/L (ref 0–50)
ANION GAP SERPL CALCULATED.3IONS-SCNC: 2 MMOL/L (ref 3–14)
ANION GAP SERPL CALCULATED.3IONS-SCNC: NORMAL MMOL/L (ref 6–17)
AST SERPL W P-5'-P-CCNC: 20 U/L (ref 0–45)
BILIRUB DIRECT SERPL-MCNC: 0.1 MG/DL (ref 0–0.2)
BILIRUB SERPL-MCNC: 0.5 MG/DL (ref 0.2–1.3)
BUN SERPL-MCNC: 13 MG/DL (ref 7–30)
BUN SERPL-MCNC: NORMAL MG/DL (ref 7–30)
CALCIUM SERPL-MCNC: 9.1 MG/DL (ref 8.5–10.1)
CALCIUM SERPL-MCNC: NORMAL MG/DL (ref 8.5–10.1)
CHLORIDE SERPL-SCNC: 111 MMOL/L (ref 94–109)
CHLORIDE SERPL-SCNC: NORMAL MMOL/L (ref 94–109)
CO2 SERPL-SCNC: 27 MMOL/L (ref 20–32)
CO2 SERPL-SCNC: NORMAL MMOL/L (ref 20–32)
CREAT SERPL-MCNC: 0.78 MG/DL (ref 0.52–1.04)
CREAT SERPL-MCNC: NORMAL MG/DL (ref 0.52–1.04)
ERYTHROCYTE [DISTWIDTH] IN BLOOD BY AUTOMATED COUNT: 12.9 % (ref 10–15)
GFR SERPL CREATININE-BSD FRML MDRD: 86 ML/MIN/{1.73_M2}
GFR SERPL CREATININE-BSD FRML MDRD: NORMAL ML/MIN/{1.73_M2}
GLUCOSE BLDC GLUCOMTR-MCNC: 76 MG/DL (ref 70–99)
GLUCOSE BLDC GLUCOMTR-MCNC: 87 MG/DL (ref 70–99)
GLUCOSE SERPL-MCNC: 82 MG/DL (ref 70–99)
GLUCOSE SERPL-MCNC: NORMAL MG/DL (ref 70–99)
HCT VFR BLD AUTO: 41.3 % (ref 35–47)
HGB BLD-MCNC: 14.1 G/DL (ref 11.7–15.7)
MCH RBC QN AUTO: 27.7 PG (ref 26.5–33)
MCHC RBC AUTO-ENTMCNC: 34.1 G/DL (ref 31.5–36.5)
MCV RBC AUTO: 81 FL (ref 78–100)
PLATELET # BLD AUTO: 193 10E9/L (ref 150–450)
POTASSIUM SERPL-SCNC: 3.8 MMOL/L (ref 3.4–5.3)
POTASSIUM SERPL-SCNC: NORMAL MMOL/L (ref 3.4–5.3)
PROT SERPL-MCNC: 6.9 G/DL (ref 6.8–8.8)
RBC # BLD AUTO: 5.09 10E12/L (ref 3.8–5.2)
SODIUM SERPL-SCNC: 140 MMOL/L (ref 133–144)
SODIUM SERPL-SCNC: NORMAL MMOL/L (ref 133–144)
TROPONIN I SERPL-MCNC: <0.015 UG/L (ref 0–0.04)
WBC # BLD AUTO: 5.7 10E9/L (ref 4–11)

## 2020-03-19 PROCEDURE — 0298T ZIO PATCH HOLTER ADULT PEDIATRIC GREATER THAN 48 HRS: CPT | Performed by: INTERNAL MEDICINE

## 2020-03-19 PROCEDURE — 36415 COLL VENOUS BLD VENIPUNCTURE: CPT | Performed by: PHYSICIAN ASSISTANT

## 2020-03-19 PROCEDURE — G0408 INPT/TELE FOLLOW UP 35: HCPCS | Mod: GO | Performed by: PHYSICIAN ASSISTANT

## 2020-03-19 PROCEDURE — 25000132 ZZH RX MED GY IP 250 OP 250 PS 637: Performed by: INTERNAL MEDICINE

## 2020-03-19 PROCEDURE — 99239 HOSP IP/OBS DSCHRG MGMT >30: CPT | Performed by: INTERNAL MEDICINE

## 2020-03-19 PROCEDURE — 93306 TTE W/DOPPLER COMPLETE: CPT

## 2020-03-19 PROCEDURE — 97162 PT EVAL MOD COMPLEX 30 MIN: CPT | Mod: GP

## 2020-03-19 PROCEDURE — 93306 TTE W/DOPPLER COMPLETE: CPT | Mod: 26 | Performed by: INTERNAL MEDICINE

## 2020-03-19 PROCEDURE — 84484 ASSAY OF TROPONIN QUANT: CPT | Performed by: PHYSICIAN ASSISTANT

## 2020-03-19 PROCEDURE — 00000146 ZZHCL STATISTIC GLUCOSE BY METER IP

## 2020-03-19 PROCEDURE — 97535 SELF CARE MNGMENT TRAINING: CPT | Mod: GO | Performed by: OCCUPATIONAL THERAPIST

## 2020-03-19 PROCEDURE — 80076 HEPATIC FUNCTION PANEL: CPT | Performed by: PHYSICIAN ASSISTANT

## 2020-03-19 PROCEDURE — 97110 THERAPEUTIC EXERCISES: CPT | Mod: GO | Performed by: OCCUPATIONAL THERAPIST

## 2020-03-19 PROCEDURE — 92610 EVALUATE SWALLOWING FUNCTION: CPT | Mod: GN | Performed by: SPEECH-LANGUAGE PATHOLOGIST

## 2020-03-19 PROCEDURE — 25000132 ZZH RX MED GY IP 250 OP 250 PS 637: Performed by: PHYSICIAN ASSISTANT

## 2020-03-19 PROCEDURE — 97116 GAIT TRAINING THERAPY: CPT | Mod: GP

## 2020-03-19 PROCEDURE — 85027 COMPLETE CBC AUTOMATED: CPT | Performed by: PHYSICIAN ASSISTANT

## 2020-03-19 PROCEDURE — 0296T ZIO PATCH HOLTER ADULT PEDIATRIC GREATER THAN 48 HRS: CPT

## 2020-03-19 PROCEDURE — 97165 OT EVAL LOW COMPLEX 30 MIN: CPT | Mod: GO | Performed by: OCCUPATIONAL THERAPIST

## 2020-03-19 PROCEDURE — 80048 BASIC METABOLIC PNL TOTAL CA: CPT | Performed by: PHYSICIAN ASSISTANT

## 2020-03-19 RX ORDER — ATORVASTATIN CALCIUM 80 MG/1
80 TABLET, FILM COATED ORAL DAILY
Qty: 30 TABLET | Refills: 0 | Status: SHIPPED | OUTPATIENT
Start: 2020-03-19 | End: 2020-04-17

## 2020-03-19 RX ORDER — CLOPIDOGREL BISULFATE 75 MG/1
75 TABLET ORAL DAILY
Qty: 21 TABLET | Refills: 0 | Status: SHIPPED | OUTPATIENT
Start: 2020-03-19 | End: 2020-07-27

## 2020-03-19 RX ORDER — LOSARTAN POTASSIUM 100 MG/1
100 TABLET ORAL AT BEDTIME
Status: DISCONTINUED | OUTPATIENT
Start: 2020-03-19 | End: 2020-03-19 | Stop reason: HOSPADM

## 2020-03-19 RX ORDER — ASPIRIN 325 MG
325 TABLET, DELAYED RELEASE (ENTERIC COATED) ORAL DAILY
Qty: 30 TABLET | Refills: 0 | Status: SHIPPED | OUTPATIENT
Start: 2020-04-10 | End: 2020-07-27

## 2020-03-19 RX ORDER — HYDROCHLOROTHIAZIDE 12.5 MG/1
12.5 TABLET ORAL AT BEDTIME
Status: DISCONTINUED | OUTPATIENT
Start: 2020-03-19 | End: 2020-03-19 | Stop reason: HOSPADM

## 2020-03-19 RX ADMIN — ASPIRIN 81 MG: 81 TABLET, DELAYED RELEASE ORAL at 11:10

## 2020-03-19 RX ADMIN — LOSARTAN POTASSIUM 100 MG: 100 TABLET, FILM COATED ORAL at 11:10

## 2020-03-19 RX ADMIN — CLOPIDOGREL BISULFATE 75 MG: 75 TABLET, FILM COATED ORAL at 11:10

## 2020-03-19 RX ADMIN — HYDROCHLOROTHIAZIDE 12.5 MG: 12.5 TABLET ORAL at 11:58

## 2020-03-19 ASSESSMENT — ACTIVITIES OF DAILY LIVING (ADL)
ADLS_ACUITY_SCORE: 10

## 2020-03-19 NOTE — PLAN OF CARE
PT:  Discharge Planner PT   Patient plan for discharge: Return home today  Current status: Currently pt requires CGA for gait of 350 ft without AD with dec balance and difficulty with L foot placement/coordination affecting her balance. Amb another 350 ft with SEC and SBA with improved gait pattern and safety. Ascended and descended 9 stairs with 1 rail with good safety and stability. Pt demonstrates dec L LE strength, balance, coordination and difficulty ambulating and would benefit from skilled PT services in order to improve this.  Barriers to return to prior living situation: None from a mobility standpoint  Recommendations for discharge: Return home with use of SEC and OPPT  Rationale for recommendations: Pt would benefit from OPPT for improvement of above deficits and return to PLOF post CVA. Pt plans to get a SEC at the store after discharge.       Entered by: Rebecca Dunn 03/19/2020 2:18 PM      Pt to discharge home today. PT goals met.    Physical Therapy Discharge Summary    Reason for therapy discharge:    Discharged to home with outpatient therapy.    Progress towards therapy goal(s). See goals on Care Plan in [facility] electronic health record for goal details.  Goals met    Therapy recommendation(s):    Continued therapy is recommended.  Rationale/Recommendations:  OPPT for CVA.

## 2020-03-19 NOTE — PLAN OF CARE
Pt here with  a stroke. A&O times. Neuro's with LUE and LLE tingling, improving, slight LUE pronator drift. VSS, except hypertensive. Regular diet, good appetite.  Takes pills whole with water. Up independently. Pt scoring green on the Aggression Stop Light Tool. Patient will discharge this afternoon. Discharge instructions discussed with patient, stroke book given, and stroke risk factors discussed.  Questions encouraged and answered. Patient will follow up with PCP and Neurologist.

## 2020-03-19 NOTE — PLAN OF CARE
A&O x4. VSS ex. HTN within parameters on RA. Denies pain. Neuro intact ex. left sided weakness, slightly slurred speech.  Bedside swallow failed. NPO until SLP sees. CMS intact ex. L Upper and Lower extremity tingling. Up with SBA. K 3.2, replaced, recheck in AM.  B. Tele SR.  Voiding adequately in BR. PIV infusing. Pt scoring green on the Aggression Stop Light Tool.  Continue to monitor

## 2020-03-19 NOTE — PLAN OF CARE
Discharge Planner OT       OT:(Late Entry)Evaluation completed and treatment initiated. Pt. admitted due to CVA. Pt. resides in a multi-level home w/ spouse, daughter. Pt. typically indep. W/ I/ADL's, works as a  high school counselor.     Patient plan for discharge: Home  Current status: Pt. A & O, 4/4 correct responses  safety/judgment questions, STM recall 2/3 words after delay. Pt. presents w/ mild LUE incoordination, reports some L hand tingling/numbness, LLE weaknes + impaired balance. Pt. able to demo. bed mobility, supine-sit at indep. level;sit-stand w/ SBA;ambulated to bathroom w/ SBA, slightly unsteady, no AD used;pt. completed toileting /toilet transfer w/ SBA;pt. tolerated standing at sink to complete grooming/hyg. tasks w/ SBA, some mild difficulty using LUE/hand in fx. tasks. Pt. returned to bed, wanting to eat breakfast. Ed. in use of shower chair for bathing--issued DME resource handout; Overall, doing well;Ed. in current DC recs.:OP OT.  Barriers to return to prior living situation: tub/shower + stairs  Recommendations for discharge: Home w/ assist for initial tub transfer/bathing as needed + OPOT  Rationale for recommendations: Overall, pt. doing quite well. Rec.family assist w/ initial tub transfer(unable to trial prior to discharge), use of shower chair for safety as indicated + OPOT to address LUE incoordination.     Occupational Therapy Discharge Summary    Reason for therapy discharge:    Discharged to home with outpatient therapy.    Progress towards therapy goal(s). See goals on Care Plan in [facility] electronic health record for goal details.  Goals partially met.  Barriers to achieving goals:   discharge on same date as initial evaluation.    Therapy recommendation(s):    Continued therapy is recommended.  Rationale/Recommendations:  Rec. continued skilled OT to address LUE incoordination, I/ADL's. Pt. discharged to home 3/19/20..           Entered by: Veronica Mantilla 03/19/2020 1:43 PM

## 2020-03-19 NOTE — PROGRESS NOTES
03/19/20 1344   Quick Adds   Type of Visit Initial PT Evaluation   Living Environment   Lives With spouse;child(david), adult   Living Arrangements house   Home Accessibility stairs to enter home;stairs within home   Number of Stairs, Main Entrance 3   Stair Railings, Main Entrance railing on right side (ascending)   Number of Stairs, Within Home, Primary other (see comments)  (14)   Stair Railings, Within Home, Primary railing on right side (ascending)   Self-Care   Usual Activity Tolerance good   Current Activity Tolerance moderate   Regular Exercise No   Equipment Currently Used at Home none   Activity/Exercise/Self-Care Comment Works full time as a high school conselor.   Functional Level Prior   Ambulation 0-->independent   Transferring 0-->independent   Fall history within last six months yes   Number of times patient has fallen within last six months 1   Which of the above functional risks had a recent onset or change? ambulation;transferring   General Information   Onset of Illness/Injury or Date of Surgery - Date 03/18/20   Referring Physician Gogo Deleon PA-C   Patient/Family Goals Statement Return home today   Pertinent History of Current Problem (include personal factors and/or comorbidities that impact the POC) Pt admitted with L sided weakness and L hand numbness, aphasia which has now greatly improved with no further slurred speech. Found to have a R sided CVA. PMH: HTN, asthma, depression.   Precautions/Limitations fall precautions   Weight-Bearing Status - LLE full weight-bearing   Weight-Bearing Status - RLE full weight-bearing   Cognitive Status Examination   Orientation orientation to person, place and time   Level of Consciousness alert   Follows Commands and Answers Questions 100% of the time;able to follow multistep instructions   Personal Safety and Judgment intact   Pain Assessment   Patient Currently in Pain No   Integumentary/Edema   Integumentary/Edema no deficits were identifed  "  Posture    Posture Not impaired   Range of Motion (ROM)   ROM Quick Adds No deficits were identified   Strength   Strength Comments R LE 5/5, L hip flex 4+/5, knee ext 5/5, DF 4+/5   Bed Mobility   Bed Mobility Comments Independent   Transfer Skills   Transfer Comments Independent   Gait   Gait Comments Pt ambulated 10 ft with CGA and no AD with varying L foot placement and dec in balance noted   Balance   Balance Comments Balance dec with gait. Sitting balance WNL.   Sensory Examination   Sensory Perception Comments Numbness in L fingertips   Coordination   Coordination Comments B heel to shin WNL   Muscle Tone   Muscle Tone no deficits were identified   General Therapy Interventions   Planned Therapy Interventions gait training;transfer training   Clinical Impression   Criteria for Skilled Therapeutic Intervention yes, treatment indicated   PT Diagnosis Difficulty ambulating   Influenced by the following impairments Dec L LE strength, coordination, balance.   Functional limitations due to impairments Difficulty ambulating   Clinical Presentation Stable/Uncomplicated   Clinical Presentation Rationale medically improving   Clinical Decision Making (Complexity) Moderate complexity   Therapy Frequency Daily   Predicted Duration of Therapy Intervention (days/wks) 1 day   Anticipated Discharge Disposition Home with Outpatient Therapy   Risk & Benefits of therapy have been explained Yes   Patient, Family & other staff in agreement with plan of care Yes   [facility] [employer] \"6 Clicks\"   2016, Trustees of [facility], under license to [employer].  All rights reserved.   6 Clicks Short Forms Basic Mobility Inpatient Short Form   [facility] [employer]  \"6 Clicks\" V.2 Basic Mobility Inpatient Short Form   1. Turning from your back to your side while in a flat bed without using bedrails? 4 - None   2. Moving from lying on your back to sitting on the side of a flat bed without using bedrails? 4 - None   3. " Moving to and from a bed to a chair (including a wheelchair)? 4 - None   4. Standing up from a chair using your arms (e.g., wheelchair, or bedside chair)? 4 - None   5. To walk in hospital room? 4 - None   6. Climbing 3-5 steps with a railing? 4 - None   Basic Mobility Raw Score (Score out of 24.Lower scores equate to lower levels of function) 24   Total Evaluation Time   Total Evaluation Time (Minutes) 15

## 2020-03-19 NOTE — DISCHARGE SUMMARY
[facility]  Hospitalist Discharge Summary       Date of Admission:  3/18/2020  Date of Discharge:  3/19/2020  Discharging Provider: Noemi Dunbar MD      Discharge Diagnoses   Ischemic acute right thalamic and internal capsule infarct secondary to small vessel occlusion  Uncontrolled hypertension  Dyslipidemia  Mild intermittent asthma  Depression    Follow-ups Needed After Discharge   Follow-up Appointments     Follow-up and recommended labs and tests       Follow up with primary care provider, Lorrie Macias, within 7   days for hospital follow- up/or phone check-in due to CO VID 19.  The   following labs/tests are recommended: Lipid panel in 6-8 weeks.  Follow-up with neurology in 4 to 6 weeks           Unresulted Labs Ordered in the Past 30 Days of this Admission     No orders found for last 31 day(s).        Discharge Disposition   Discharged to home  Condition at discharge: Stable    Hospital Course   Trini Ramirez is a 55 year old female with PMH significant for hypertension, hyperlipidemia, mild intermittent asthma, and depression who was admitted to [facility] on 3/18/20 with complaints of worsening left-sided weakness and paresthesias and mild aphasia the night before admission, found to have acute stroke. Did not receive tPA due to last known normal outside of window.      Acute right-sided stroke  Left foot paresthesias night prior to admission progressing to left upper extremity and very mild aphasia.  Came to the ED next morning, was out of window for TPA.  Code stroke called.  EKG without obvious ischemic findings.  Brain MRA revealed acute infarct in region of right thalamus/posterior limb of internal capsule.  Findings of possible chronic lacunar infarct are or additional acute infarct are unclear at the region of the Lt basal ganglia. On admission she had mild paresthesias on the Lt which are improving and mild aphasia.  Admission lipid panel showed , HDL 40 triglycerides  159, started on high-dose atorvastatin 80 mg.  Recent TSH 3.85.   No history of A. fib.  Not on anticoagulants.  -Stroke neurology followed patient in-house and recommended aspirin 81 mg and Plavix 75 mg for 21 days after initial loading in the ED altered by 325 mg of aspirin indefinitely  Initially allowed permissive hypertension, discussed with Dr. Marshall from neurology who recommended reinitiation of antihypertensives prior to discharge  -Patient needs to have better blood pressure control and her primary care provider can adjust her medication.  Goal blood pressure less than 135/80  -Occupational therapy recommended outpatient OT, will get recommendation from PT prior to discharge  -  ECHO with bubble study and CTA H&N unremarkable, hemoglobin A1c 5.3       Hypertension  Hyperlipidemia  Lipid panel 2/14/2020: , HDL 37, , .  Repeat lipid panel during this hospitalization showed LDL of 208. Been seeing primary care for blood pressure and lipid management.  No history of statin use.  Patient has been attempting to diet control her hyperlipidemia.  - Started the patient on atorvastatin 80 mg nightly,-resumed PTA losartan 100 mg daily and hydrochlorothiazide 12.5 mg daily at the time of discharge.  PCP to adjust medication with a goal blood pressure of 135/80 after discharge  -Lipid panel recheck in 8 to 12 weeks    Mild intermittent asthma: PRN albuterol available, patient states she has not taken any inhalers in quite a long period of time.     Depression  PTA medication resumed at the time of discharge.       Consultations This Hospital Stay   NEUROLOGY IP CONSULT  PHYSICAL THERAPY ADULT IP CONSULT  OCCUPATIONAL THERAPY ADULT IP CONSULT  SPEECH LANGUAGE PATH ADULT IP CONSULT  SWALLOW EVAL SPEECH PATH AT BEDSIDE IP CONSULT  PATIENT LEARNING CENTER IP CONSULT    Code Status   Full Code    Time Spent on this Encounter   I, Noemi Dunbar MD, personally saw the patient today and spent greater than 30  minutes discharging this patient.       Noemi Dunbar MD  [facility]  ______________________________________________________________________    Physical Exam   Vital Signs: Temp: 98  F (36.7  C) Temp src: Oral BP: (!) 171/104 Pulse: 94   Resp: 18 SpO2: 95 % O2 Device: None (Room air)    Weight: 205 lbs 0 oz  Exam:  Constitutional: Awake, alert and no distress. Appears comfortable  Head: Normocephalic. No masses, lesions, tenderness or abnormalities  ENT: ENT exam normal, no neck nodes or sinus tenderness  Cardiovascular: RRR.  no murmurs, no rubs or JVD  Respiratory:normal WOB,b/l equal air entry, no wheezes or crackles   Gastrointestinal: Abdomen soft, non-tender. BS normal. No masses, organomegaly  : Deferred  Extremities :no edema , no clubbing or cyanosis      Primary Care Physician   Lorrie Macias    Discharge Orders      Occupational Therapy Referral      Physical Therapy Referral      Reason for your hospital stay    Acute stroke     Follow-up and recommended labs and tests     Follow up with primary care provider, Lorrie Macias, within 7 days for hospital follow- up/or phone check-in due to CO VID 19.  The following labs/tests are recommended: Lipid panel in 6-8 weeks.     Activity    Your activity upon discharge: activity as tolerated     Monitor and record    blood pressure daily and readings to PCP for any medication adjustment.  Goal blood pressure less than 135/80     Discharge Instructions    Aspirin 81 mg and Plavix for 3 weeks, after that aspirin 325 mg indefinitely     Full Code    As documented during admission     Cardiac Event Monitor Adult Pediatric    Results to PCP     Diet    Follow this diet upon discharge: Orders Placed This Encounter      Regular Diet Adult       Significant Results and Procedures   Results for orders placed or performed during the hospital encounter of 03/18/20   MR Brain w/o & w Contrast    Narrative    MRI BRAIN WITHOUT AND WITH  CONTRAST  3/18/2020 12:25 PM     HISTORY: Left foot and arm weakness, paresthesias.     TECHNIQUE: Multiplanar, multisequence MRI of the brain without and  with 9 mL Gadavist.      COMPARISON: None.     FINDINGS: Area of restricted diffusion and T2 hyperintensity in the  region of the right thalamus and posterior limb of the internal  capsule measuring 1 cm. This is concerning for an acute area of  infarct. No evidence of hemorrhagic transformation. No mass effect or  midline shift. Subtle small 3 mm region of restricted diffusion on the  left basal ganglia (series 5 image 84). Ventricular size is within  normal limits without evidence of hydrocephalus. No abnormal extra  axial fluid collection. Mild patchy periventricular white matter T2  hyperintensities are nonspecific, but likely related to chronic  microvascular ischemic disease. T2 hyperintensities in the basal  ganglia may be due to chronic lacunar infarcts or dilated perivascular  spaces.    There is no abnormal intracranial enhancement.     The facial structures appear normal.       Impression    IMPRESSION:    1. Acute infarct in the region of the right thalamus/posterior limb of  the internal capsule. No evidence of hemorrhagic transformation. No  mass effect or midline shift.   2. Subtle restricted diffusion in the left basal ganglia. Unclear  whether this represents an additional acute infarct or region of T2  shine through from chronic lacunar infarct.  3. Mild nonspecific white matter changes likely due to chronic  microvascular ischemic disease.       JONE GOMES MD   CTA Angiogram Head Neck    Narrative    CT ANGIOGRAM OF THE HEAD AND NECK WITHOUT AND WITH CONTRAST  3/18/2020  8:24 PM     COMPARISON: None.    HISTORY: Neuro deficit(s), subacute.    TECHNIQUE:  Precontrast localizing scans were followed by CT  angiography with an injection of 70mL [id number]-370 nonionic intravenous  contrast material with scans through the head and neck.  Images  were  transferred to a separate 3-D workstation where multiplanar  reformations and 3-D images were created.  Estimates of carotid  stenoses are made relative to the distal internal carotid artery  diameters except as noted.      FINDINGS:   Neck CTA: The common carotid arteries bilaterally are patent without  vascular narrowing. There is noncalcified atherosclerotic plaque at  the origin of the right internal carotid artery that does not result  in any significant vascular narrowing. The cervical internal carotid  arteries bilaterally are otherwise patent and unremarkable. The  vertebral arteries bilaterally are patent and unremarkable.    Head CTA: The basilar, bilateral intracranial distal internal carotid,  bilateral anterior cerebral, bilateral middle cerebral and bilateral  posterior cerebral arteries are patent and unremarkable. The anterior  communicating and bilateral posterior communicating arteries are  patent.      Impression    IMPRESSION:  1. Noncalcified atherosclerotic plaque at the origin of the right  internal carotid artery that does not result in any significant  vascular narrowing.  2. Otherwise, normal neck and head CTA.      Radiation dose for this scan was reduced using automated exposure  control, adjustment of the mA and/or kV according to patient size, or  iterative reconstruction technique    MANGO WHEELER MD   Echocardiogram Complete - Bubble study    Narrative    [id number]  [id number]  [id number]^LEONORA^VIOLET^ADEN           [facility]  Echocardiography Laboratory  [address] [place] [address]        Name: JOI RODRIGEZ  MRN: [id number]  : 1965  Study Date: 2020 08:13 AM  Age: 55 yrs  Gender: Female  [facility]  Reason For Study: CVA  Ordering Physician: VIOLET DELEON  Referring Physician: МАРИНА PERRY  Performed By: Beti Deleon     BSA: 2.0 m2  Height: 66 in  Weight: 205 lb  HR: 77  BP: 160/103  mmHg  _____________________________________________________________________________  __        Procedure  Complete Portable Bubble Echo Adult.  _____________________________________________________________________________  __        Interpretation Summary     No cardiac source of emboli noted.  _____________________________________________________________________________  __        Left Ventricle  The left ventricle is normal in size. There is mild concentric left  ventricular hypertrophy. Left ventricular diastolic function is normal. The  visual ejection fraction is estimated at 55-60%.     Right Ventricle  The right ventricle is normal in structure, function and size.     Atria  Normal left atrial size. Right atrial size is normal. A contrast injection  (Bubble Study) was performed that was negative for flow across the interatrial  septum.     Mitral Valve  The mitral valve leaflets appear normal. There is no evidence of stenosis,  fluttering, or prolapse.        Tricuspid Valve  Normal tricuspid valve.     Aortic Valve  Normal tricuspid aortic valve.     Pulmonic Valve  The pulmonic valve is not well seen, but is grossly normal.     Vessels  The aortic root is normal size. Normal size ascending aorta. The inferior vena  cava is normal.     Pericardium  There is no pericardial effusion.        Rhythm  Sinus rhythm was noted.  _____________________________________________________________________________  __  MMode/2D Measurements & Calculations  IVSd: 1.2 cm     LVIDd: 4.5 cm  LVIDs: 2.6 cm  LVPWd: 1.2 cm  FS: 41.2 %  LV mass(C)d: 202.0 grams  LV mass(C)dI: 100.0 grams/m2  Ao root diam: 3.3 cm  LA dimension: 4.0 cm  asc Aorta Diam: 3.1 cm  LA/Ao: 1.2  LVOT diam: 2.1 cm  LVOT area: 3.4 cm2  LA Volume (BP): 60.6 ml  LA Volume Index (BP): 30.0 ml/m2  RWT: 0.54           Doppler Measurements & Calculations  MV E max aletha: 89.2 cm/sec  MV A max aletha: 110.6 cm/sec  MV E/A: 0.81  MV dec time: 0.20 sec  PA V2 max: 90.3  cm/sec  PA max PG: 3.3 mmHg  PA acc time: 0.06 sec  E/E' av.4  Lateral E/e': 9.0  Medial E/e': 15.7           _____________________________________________________________________________  __           Report approved by: Nicki Alvarez 2020 09:41 AM            Discharge Medications   Current Discharge Medication List      START taking these medications    Details   !! aspirin (ASA) 325 MG EC tablet Take 1 tablet (325 mg) by mouth daily  Qty: 30 tablet, Refills: 0    Comments: Future refills by PCP Dr. Lorrie Macias with phone number [phone].  Associated Diagnoses: Cerebrovascular accident (CVA), unspecified mechanism (H)      !! aspirin (ASA) 81 MG EC tablet Take 1 tablet (81 mg) by mouth daily for 22 days  Qty: 21 tablet, Refills: 0    Comments: 81 mg for 3 weeks while on Plavix after that full dose aspirin  Associated Diagnoses: Cerebrovascular accident (CVA), unspecified mechanism (H)      atorvastatin (LIPITOR) 80 MG tablet 1 tablet (80 mg) by Oral or NG Tube route daily  Qty: 30 tablet, Refills: 0    Comments: Future refills by PCP Dr. Lorrie Macias with phone number [phone].  Associated Diagnoses: Cerebrovascular accident (CVA), unspecified mechanism (H)      clopidogrel (PLAVIX) 75 MG tablet 1 tablet (75 mg) by Oral or NG Tube route daily for 21 days  Qty: 21 tablet, Refills: 0    Associated Diagnoses: Cerebrovascular accident (CVA), unspecified mechanism (H)       !! - Potential duplicate medications found. Please discuss with provider.      CONTINUE these medications which have NOT CHANGED    Details   fluticasone (FLONASE) 50 MCG/ACT nasal spray Spray 1-2 sprays into both nostrils daily as needed for rhinitis or allergies      hydrochlorothiazide (HYDRODIURIL) 12.5 MG tablet Take 12.5 mg by mouth At Bedtime       hydrOXYzine (VISTARIL) 50 MG capsule Take 50 mg by mouth At Bedtime      losartan (COZAAR) 100 MG tablet Take 100 mg by mouth At Bedtime      sertraline  (ZOLOFT) 100 MG tablet Take 200 mg by mouth At Bedtime      traZODone (DESYREL) 50 MG tablet Take 50 mg by mouth At Bedtime      albuterol (PROAIR HFA/PROVENTIL HFA/VENTOLIN HFA) 108 (90 Base) MCG/ACT inhaler INHALE 2 PUFFS INTO THE LUNGS EVERY 4 HOURS AS NEEDED  Qty: 18 g, Refills: 0    Associated Diagnoses: Acute bronchitis, unspecified organism      fluticasone-salmeterol (WIXELA INHUB) 100-50 MCG/DOSE inhaler Inhale 1 puff into the lungs 2 times daily as needed           Allergies   Allergies   Allergen Reactions     Seasonal Allergies

## 2020-03-19 NOTE — PROGRESS NOTES
03/19/20 0900   Quick Adds   Type of Visit Initial Occupational Therapy Evaluation   Living Environment   Lives With child(david), adult;spouse   Living Arrangements house   Home Accessibility stairs to enter home;stairs within home   Number of Stairs, Main Entrance   (3 steps to enter L side)   Number of Stairs, Within Home, Primary   (multiple stair 2 -10-2, 1 rail)   Transportation Anticipated car, drives self;family or friend will provide   Living Environment Comment tub/shower combo.;standard toilet    Self-Care   Regular Exercise No   Equipment Currently Used at Home none   Activity/Exercise/Self-Care Comment Pt. indep. w/I/ADL's;works aas a high school counselor   Functional Level   Ambulation 0-->independent   Transferring 0-->independent   Toileting 0-->independent   Bathing 0-->independent   Dressing 0-->independent   Eating 0-->independent   Communication 0-->understands/communicates without difficulty   Swallowing 0-->swallows foods/liquids without difficulty   Cognition 0 - no cognition issues reported   Fall history within last six months yes   Number of times patient has fallen within last six months 1   Which of the above functional risks had a recent onset or change? ambulation;transferring;toileting;bathing;dressing;fall history   General Information   Onset of Illness/Injury or Date of Surgery - Date 03/18/20   Referring Physician Gogo Deleon PA-C   Patient/Family Goals Statement Pt. would like to discharge home   Additional Occupational Profile Info/Pertinent History of Current Problem Per chart:Trini Ramirez is a 55 year old female with PMH significant for hypertension, hyperlipidemia, mild intermittent asthma, and depression who [facility] with complaints of worsening left-sided weakness and paresthesias since last night and mild aphasia, found to have acute stroke. Did not receive tPA due to last known normal outside of window.    Precautions/Limitations fall  precautions   General Observations Pt. agreeable to OT, pleasant and cooperative.   Cognitive Status Examination   Orientation orientation to person, place and time   Level of Consciousness alert   Follows Commands (Cognition) WNL   Memory   (STM recall 2/3 after short delay)   Cognitive Comment appears baseline/intact;will monitor   Visual Perception   Visual Perception Comments wears glasses;visual tracking intact;peripheral vision intact   Sensory Examination   Sensory Comments tingling reported L hand   Pain Assessment   Patient Currently in Pain No   Posture   Posture forward head position;protracted shoulders   Range of Motion (ROM)   ROM Comment BUE WNL   Strength   Strength Comments RUE: 5/5  LUE: 5/5   Hand Strength   Left hand  (pounds)   (42#, 32#, 38#)   Right hand  (pounds)   (52#, 49#, 51#)   Hand Strength Comments L hand  strength weaker than R   Coordination   Upper Extremity Coordination Left UE impaired   Fine Motor Coordination   (mild impairment)   Coordination Comments R=WNL L=able to oppose digits to thumb--min. difficulty   Mobility   Bed Mobility Comments supervision   Transfer Skill: Sit to Stand   Level of [place]: Sit/Stand stand-by assist   Toilet Transfer   Toilet Transfer Comments standard toilet   Tub/Shower Transfer   Tub/Shower Transfer Comments tub/shower combo.   Balance   Balance Comments slightly unsteady/SBA for room  mobility no AD used;short step length, looking at floor w/ room mobility   Lower Body Dressing   Level of [place]: Dress Lower Body independent   Toileting   Level of [place]: Toilet stand-by assist   Grooming   Level of [place]: Grooming stand-by assist   Eating/Self Feeding   Level of [place]: Eating independent   Instrumental Activities of Daily Living (IADL)   Previous Responsibilities meal prep;housekeeping;laundry;shopping;medication management;driving;work;   Activities of Daily Living Analysis   Impairments  "Contributing to Impaired Activities of Daily Living balance impaired;coordination impaired;motor control impaired;strength decreased  (monitor cognition)   General Therapy Interventions   Planned Therapy Interventions ADL retraining;cognition;motor coordination training;neuromuscular re-education;strengthening;home program guidelines  (monitor cognition)   Clinical Impression   Criteria for Skilled Therapeutic Interventions Met yes, treatment indicated   OT Diagnosis Decline in ADL performance   Influenced by the following impairments weakness, impaired balance, LUE incoordination   Assessment of Occupational Performance 3-5 Performance Deficits   Identified Performance Deficits Currently belopw baseline w/ dressing, grooming, bathing, toileitng, IADL\"s   Clinical Decision Making (Complexity) Low complexity   Therapy Frequency Daily   Predicted Duration of Therapy Intervention (days/wks) 2-3 days   Anticipated Equipment Needs at Discharge shower chair   Anticipated Discharge Disposition Home;Home with Assist;Home with Outpatient Therapy   Risks and Benefits of Treatment have been explained. Yes   Patient, Family & other staff in agreement with plan of care Yes   [facility] TM \"6 Clicks\"   2016, Trustees of [facility], under license to [employer].  All rights reserved.   6 Clicks Short Forms Daily Activity Inpatient Short Form   [facility] AM-PAC  \"6 Clicks\" Daily Activity Inpatient Short Form   1. Putting on and taking off regular lower body clothing? 3 - A Little   2. Bathing (including washing, rinsing, drying)? 3 - A Little   3. Toileting, which includes using toilet, bedpan or urinal? 3 - A Little   4. Putting on and taking off regular upper body clothing? 4 - None   5. Taking care of personal grooming such as brushing teeth? 4 - None   6. Eating meals? 4 - None   Daily Activity Raw Score (Score out of 24.Lower scores equate to lower levels of function) 21   Total Evaluation Time "   Total Evaluation Time (Minutes) 14

## 2020-03-19 NOTE — DISCHARGE INSTRUCTIONS
Your risk factors for stroke or TIA (transient ischemic attack):    Your Risk Factors Your Results Normal Ranges   High blood pressure BP Readings from Last 1 Encounters:   03/19/20 (!) 171/104    Less than 120/80   Cholesterol              Total Lab Results   Component Value Date    CHOL 280 03/18/2020      Less than 150    Triglycerides   Lab Results   Component Value Date    TRIG 159 03/18/2020    Less than 150   LDL Lab Results   Component Value Date     03/18/2020       Less than 70   HDL Lab Results   Component Value Date    HDL 40 03/18/2020            Greater than 40 (men)  Greater than 50 (women)   Diabetes Recent Labs   Lab 03/19/20  1138   GLC 82  Canceled, Test credited    Fasting blood glucose    Smoking/tobacco use  Quit smoking and tobacco   Overweight  Lose 1-2 pounds a week   Lack of exercise  30 minutes moderate activity each day   Other risk factors include carotid (neck) artery disease, atrial fibrillation and stress. You may be on new medicine to treat high blood pressure, cholesterol, diabetes or atrial fibrillation.    Understanding Stroke Booklet given to patient. Please refer to booklet for further information.    Stroke warning signs and symptoms - CALL 911 right away for:  - Sudden numbness or weakness in the face, arm or leg (often on one side of the body).  - Sudden confusion or trouble understanding what is going on.  - Sudden blurred or decreased vision in one or both eyes.  - Sudden trouble speaking, loss of balance, dizziness or problems with coordination.  - Sudden, severe headache for no reason.  - Fainting or seizures.  - Symptoms may go away then come back suddenly.      Please follow up with neurology in *** weeks. You may call any of the following neurology clinics for an appointment or a clinic of your choice. Tell them you were recently hospitalized and need follow up as recommended at discharge.    1. [facility]   [address] [facility]  [address]   [place] [address]   [phone]  2. [facility]   [address] [facility], [address]   [place] [address]   [phone]  3. [facility] - Zully Lim MD   [address] [place], [place] [id number] [phone]  4. [facility] - Shoshana Lim MD   [address]   [place], [place] [id number] [phone]      Neurology also recommends follow-up in 4-6 weeks.

## 2020-03-19 NOTE — PLAN OF CARE
Discharge Planner SLP   Patient plan for discharge: Did not state  Current status: SLP: Pt presents with a functional swallow on clinical swallow evaluation. Pt reported slight dysarthria has resolved. Oral motor exam WFL. No obvious dysarthria. Thin liquids, puree solids and regular solids trialed. No oral deficits and no s/sx of aspiration throughout the evaluation. Pt denied globus sensation.     Recommend: regular diet and thin liquids with standard aspiration precautions. Pt verbalized agreement with no further SLP services at this time. Will complete orders.     Barriers to return to prior living situation: None from SLP  Recommendations for discharge: Home  Rationale for recommendations: Evaluation only. No further SLP services.        Entered by: Amna Nunez 03/19/2020 9:14 AM

## 2020-03-20 ENCOUNTER — TELEPHONE (OUTPATIENT)
Dept: FAMILY MEDICINE | Facility: CLINIC | Age: 55
End: 2020-03-20

## 2020-03-20 NOTE — TELEPHONE ENCOUNTER
"Hospital/TCU/ED for chronic condition Discharge Protocol    \"Hi, my name is Leonardo Fink RN, a registered nurse, and I am calling from [facility].  I am calling to follow up and see how things are going for you after your recent emergency visit/hospital/TCU stay.\"    Tell me how you are doing now that you are home?\" pt is doing fine.      Discharge Instructions    \"Let's review your discharge instructions.  What is/are the follow-up recommendations?  Pt. Response: Pt made telephone visit with pcp for hosp f/u.    \"Has an appointment with your primary care provider been scheduled?\"   Yes. (confirm)    \"When you see the provider, I would recommend that you bring your medications with you.\"    Medications    \"Tell me what changed about your medicines when you discharged?\"    Changes to chronic meds?    2 or more - Epic MTM referral needed    \"What questions do you have about your medications?\"    None     New diagnoses of heart failure, COPD, diabetes, or MI?    No              Post Discharge Medication Reconciliation Status: discharge medications reconciled, continue medications without change.    Was MTM referral placed (*Make sure to put transitions as reason for referral)?   No    Call Summary    \"What questions or concerns do you have about your recent visit and your follow-up care?\"     none    \"If you have questions or things don't continue to improve, we encourage you contact us through the main clinic number (give number).  Even if the clinic is not open, triage nurses are available 24/7 to help you.     We would like you to know that our clinic has extended hours (provide information).  We also have urgent care (provide details on closest location and hours/contact info)\"      \"Thank you for your time and take care!\"  SHERIE Enamorado             "

## 2020-03-23 ENCOUNTER — HOSPITAL ENCOUNTER (OUTPATIENT)
Dept: PHYSICAL THERAPY | Facility: CLINIC | Age: 55
Setting detail: THERAPIES SERIES
End: 2020-03-23
Attending: INTERNAL MEDICINE
Payer: COMMERCIAL

## 2020-03-23 DIAGNOSIS — I63.9 CEREBROVASCULAR ACCIDENT (CVA), UNSPECIFIED MECHANISM (H): ICD-10-CM

## 2020-03-23 PROCEDURE — 97112 NEUROMUSCULAR REEDUCATION: CPT | Mod: GP | Performed by: PHYSICAL THERAPIST

## 2020-03-23 PROCEDURE — 97162 PT EVAL MOD COMPLEX 30 MIN: CPT | Mod: GP | Performed by: PHYSICAL THERAPIST

## 2020-03-23 ASSESSMENT — 6 MINUTE WALK TEST (6MWT)
TOTAL DISTANCE WALKED (FT): 1330
COMMENTS: NO AD

## 2020-03-23 NOTE — IP AVS SNAPSHOT
"                  MRN:[id number]                      After Visit Summary   3/23/2020    Trini Ramirez    MRN: [id number]           Visit Information        Provider Department      3/23/2020 10:00 AM Reyna Armenta PT [facility], [place], Physical Therapy - Outpatient        Your next 10 appointments already scheduled    Mar 24, 2020 10:20 AM CDT  Telephone Visit with Lorrie Macias PA-C  [facility] ([facility]) [address] [place] [id number] [phone]   Note: this is not an onsite visit; there is no need to come to the facility.          Further instructions from your care team       Try MyCharting your Blood pressures to your PCP every 3 days while you are making medication changes. She will tell you when to stop.      Depression is very common after a stroke, even a mild one. Highest incidence is in the first 6 months. You might need your meds changed. This will likely be temporary. I would encourage you to start some mental health therapy at this point. This is a big emotional deal!      Fatigue is completely normal, even after your body is \"back to normal.\" You may need to take naps for months.          MyChart Information    [facility] gives you secure access to your electronic health record. If you see a primary care provider, you can also send messages to your care team and make appointments. If you have questions, please call your primary care clinic.  If you do not have a primary care provider, please call [phone] and they will assist you.       Care EveryWhere ID    This is your Care EveryWhere ID. This could be used by other organizations to access your [place] medical records  [id number]       Equal Access to Services    [facility] : Hadii yordan Erazo, wakiya garcia, qaybmery bolivar. So [facility] [phone].    ATENCIÓN: Si habla español, tiene a pierce disposición servicios " savannah de asistencia lingüística. Sanjiv sow [phone].    We comply with applicable federal and state civil rights laws, including the [place] Human Rights Act. We do not discriminate on the basis of race, color, creed, Roman Catholic, national origin, marital status, age, disability, sex, sexual orientation, or gender identity.

## 2020-03-23 NOTE — DISCHARGE INSTRUCTIONS
"Try MyCharting your Blood pressures to your PCP every 3 days while you are making medication changes. She will tell you when to stop.      Depression is very common after a stroke, even a mild one. Highest incidence is in the first 6 months. You might need your meds changed. This will likely be temporary. I would encourage you to start some mental health therapy at this point. This is a big emotional deal!      Fatigue is completely normal, even after your body is \"back to normal.\" You may need to take naps for months.        "

## 2020-03-24 ENCOUNTER — VIRTUAL VISIT (OUTPATIENT)
Dept: FAMILY MEDICINE | Facility: CLINIC | Age: 55
End: 2020-03-24
Payer: COMMERCIAL

## 2020-03-24 VITALS — SYSTOLIC BLOOD PRESSURE: 137 MMHG | DIASTOLIC BLOOD PRESSURE: 108 MMHG

## 2020-03-24 DIAGNOSIS — F33.0 MILD RECURRENT MAJOR DEPRESSION (H): ICD-10-CM

## 2020-03-24 DIAGNOSIS — I10 HYPERTENSION GOAL BP (BLOOD PRESSURE) < 140/90: Primary | ICD-10-CM

## 2020-03-24 DIAGNOSIS — Z86.73 HISTORY OF CVA (CEREBROVASCULAR ACCIDENT): ICD-10-CM

## 2020-03-24 PROCEDURE — 99495 TRANSJ CARE MGMT MOD F2F 14D: CPT | Mod: TEL | Performed by: PHYSICIAN ASSISTANT

## 2020-03-24 RX ORDER — HYDROCHLOROTHIAZIDE 25 MG/1
25 TABLET ORAL AT BEDTIME
Qty: 90 TABLET | Refills: 1 | Status: SHIPPED | OUTPATIENT
Start: 2020-03-24 | End: 2020-11-30

## 2020-03-24 NOTE — PROGRESS NOTES
"Trini Ramirez is a 55 year old female who is being evaluated via a billable telephone visit.      The patient has been notified of following:     \"This telephone visit will be conducted via a call between you and your physician/provider. We have found that certain health care needs can be provided without the need for a physical exam.  This service lets us provide the care you need with a short phone conversation.  If a prescription is necessary we can send it directly to your pharmacy.  If lab work is needed we can place an order for that and you can then stop by our lab to have the test done at a later time.    If during the course of the call the physician/provider feels a telephone visit is not appropriate, you will not be charged for this service.\"     Trini Ramirez complains of    Chief Complaint   Patient presents with     Hospital F/U       I have reviewed and updated the patient's Past Medical History, Social History, Family History and Medication List.    ALLERGIES  Seasonal allergies    Additional provider notes:       Hospital Follow-up Visit:    Hospital/Nursing Home/IP Rehab Facility: [facility]  Date of Admission: 3/18/20  Date of Discharge: 3/19/20  Reason(s) for Admission: Stroke             Problems taking medications regularly:  None       Medication changes since discharge: start taking plavix 75mg, Lipitor 80mg, ASA 81 and 325 Mg       Problems adhering to non-medication therapy:  None    Summary of hospitalization:  [facility] discharge summary reviewed  Diagnostic Tests/Treatments reviewed.  Follow up needed: PT  Other Healthcare Providers Involved in Patient s Care:   None  Update since discharge: improved.   Post Discharge Medication Reconciliation: discharge medications reconciled and changed, per note/orders (see AVS).  Plan of care communicated with patient     Coding guidelines for this visit:  Type of Medical   Decision Making Face-to-Face Visit       within 7 " Days of discharge Face-to-Face Visit        within 14 days of discharge   Moderate Complexity [id number]   High Complexity [id number]          Patient taking losartan 100 mg and hydrochlorothiazide 12.5 mg daily with no issues and tolerating water pill ok.  BP readings at home 130/98, 137/108 range.  No chest pain, light headedness, dizziness; being good about eating bananas regularly as well.     Abnormal Mood Symptoms      Duration: years    Description:  Depression: YES  Anxiety: YES  Panic attacks: no      Accompanying signs and symptoms: see PHQ-9 and RAUL scores    History (similar episodes/previous evaluation): None    Precipitating or alleviating factors: recent CVA    Therapies tried and outcome: Zoloft (Sertraline) 200 mg daily with overall good results and seems to be well controlled  2  Recent visit with Physical Therapy reports BPs at:  176/93, HR 95 - this was after discussing her CVA event   After FGA (balance test): 158/98, HR 95   After 6 min walk test: 156/95, .     She was also worried about possibly worsening depression, which is common after CVA (in combo with COVID isolation), and Physical Therapy recommended a health psych referral.     Assessment/Plan:  1. Hypertension goal BP (blood pressure) < 140/90  Long standing, chronic, controlled but could be a little better  - **Potassium FUTURE anytime; Future  - hydrochlorothiazide (HYDRODIURIL) 25 MG tablet; Take 1 tablet (25 mg) by mouth At Bedtime  Dispense: 90 tablet; Refill: 1    2. History of CVA (cerebrovascular accident)  New onset and stable; continue with Physical Therapy and should follow up with neurology in 4-6 weeks as well.    3. Mild recurrent major depression (H)  Long standing, chronic, controlled but monitor for any worsening over the next few months; appointment made with Ruy Cabral, Behavioral Health Consultant today as well.      Phone call duration:  18 minutes    Lorrie Macias PA-C

## 2020-03-24 NOTE — PATIENT INSTRUCTIONS
Continue losartan 100 mg daily and increase hydrochlorothiazide to 25 mg daily.  If tolerated, option to combine these medicines with one pill in near future.    I would also encourage you to eat some potassium rich foods in the mean time - bananas, tomatoes, sweet potatoes, orange juice, beet green, white beans, dates/raisins, coconut water and yogurts are good sources of potassium.     Will also schedule lab only to officially check potassium in 2 weeks, cautiously though due to COVID situation.    Continue with Zoloft 200 mg daily and appointment scheduled with Ruy Cabral, Behavioral Health Consultant as well.

## 2020-03-24 NOTE — PROGRESS NOTES
03/23/20 1000   Signing Clinician's Name / Credentials   Signing clinician's name / credentials Latrice Armenta, DPT, NCS   Functional Gait Assessment (PATRICIA Tee, TY March, et al. (2004))   1. GAIT LEVEL SURFACE 1  (7.4 secs)   2. CHANGE IN GAIT SPEED 2   3. GAIT WITH HORIZONTAL HEAD TURNS 2   4. GAIT WITH VERTICAL HEAD TURNS 2   5. GAIT AND PIVOT TURN 3   6. STEP OVER OBSTACLE 1   7. GAIT WITH NARROW BASE OF SUPPORT 1   8. GAIT WITH EYES CLOSED 2   9. AMBULATING BACKWARDS 2   10. STEPS 2   Total Functional Gait Assessment Score   TOTAL SCORE: (MAXIMUM SCORE 30) 18     Functional Gait Assessment (FGA): The FGA assesses postural stability during various walking tasks.   Gait assistive device used: None    Patient Score: 18/30  Scores of <22/30 have been correlated with predicting falls in community-dwelling older adults according to Randal & Jatin 2010.   Scores of <18/30 have been correlated with increased risk for falls in patients with Parkinsons Disease according to Ezio Sauceda Zhou et al 2014.  Minimal Detectable Change for patients with acute/chronic stroke = 4.2 according to Myriam & Radhaschel 2009  Minimal Detectable Change for patients with vestibular disorder = 8 according to Randal & Jatin 2010    Assessment (rationale for performing, application to patient s function & care plan): assess falls risk, balance challenges. Lost points primarily due to speed.  Minutes billed as physical performance test: 0 - day of eval

## 2020-03-25 ENCOUNTER — VIRTUAL VISIT (OUTPATIENT)
Dept: BEHAVIORAL HEALTH | Facility: CLINIC | Age: 55
End: 2020-03-25
Payer: COMMERCIAL

## 2020-03-25 DIAGNOSIS — F43.23 ADJUSTMENT DISORDER WITH MIXED ANXIETY AND DEPRESSED MOOD: Primary | ICD-10-CM

## 2020-03-25 PROCEDURE — 90832 PSYTX W PT 30 MINUTES: CPT | Mod: TEL | Performed by: SOCIAL WORKER

## 2020-03-25 NOTE — PROGRESS NOTES
"  [facility] Primary Care: Integrated Behavioral Health    Progress Note - Initial Session     PATIENT'S NAME: Annie Ramirez  PREFERRED NAME: annie  PREFERRED PRONOUNS: She/Her/Hers/Herself  MRN:   [id number]  :   1965  ACCT. NUMBER: [id number]  DATE OF SERVICE: 3/25/20  START TIME: 2pm  END TIME: 235pm  SESSION LENGTH: 35min   ATTENDEES: Client  PREFERRED PHONE: cell  May we leave a program related message: Yes        Service Type: Individual    Service Location:   Phone call (patient / identified key support person reached)       Annie Ramirez is a 55 year old female who is being evaluated via a telephone visit.      The patient has been notified of the following:     \"We have found that certain health care needs can be provided without the need for a face to face visit.  This service lets us provide the care you need with a short phone conversation.      I will have full access to your [facility] medical record during this entire phone call.   I will be taking notes for your medical record.     Since this is like an office visit, we will bill your insurance company for this service.  Please check with your medical insurance if this type of telephone visit/virtual care is covered.  You may be responsible for the cost of this service if insurance coverage is denied.      There are potential benefits and risks of telephone visits (e.g. limits to patient confidentiality) that differ from in-person visits.?  Confidentiality still applies for telephone services, and nobody will record the visit.  It is important to be in a quiet, private space that is free of distractions (including cell phone or other devices) during the visit.??     If during the course of the call I believe a telephone visit is not appropriate, you will not be charged for this service\"    Consent has been obtained for this service by care team member: yes.        See Flowsheets for today's PHQ-9 and RAUL-7 results  Previous " PHQ-9:   PHQ-9 SCORE 1/12/2018 1/3/2019 2/14/2020   PHQ-9 Total Score - - -   PHQ-9 Total Score MyChart - - 2 (Minimal depression)   PHQ-9 Total Score 9 7 2     Previous RAUL-7:   RAUL-7 SCORE 1/12/2018 1/3/2019 2/14/2020   Total Score - - 3 (minimal anxiety)   Total Score 12 6 3        Diagnostic Assessment Date: To be completed next session  Treatment Plan Review Date: To be completed      Depression and Anxiety Follow-Up    Status since last visit:     PHQ-9 ([employer]) 1/12/2018 1/3/2019 2/14/2020   No Interest In Doing Things - - -   Feeling Depressed - - -   Trouble Sleeping - - -   Tired / No Energy - - -   No appetite or Over-Eating - - -   Feeling Bad about Self - - -   Trouble Concentrating - - -   Moving Slow or Restless - - -   Suicidal Thoughts - - -   Total Score - - -   1.  Little interest or pleasure in doing things 0 0 0   2.  Feeling down, depressed, or hopeless 0 1 0   3.  Trouble falling or staying asleep, or sleeping too much 2 2 1   4.  Feeling tired or having little energy 2 1 1   5.  Poor appetite or overeating 3 2 0   6.  Feeling bad about yourself 0 1 0   7.  Trouble concentrating 2 0 0   8.  Moving slowly or restless 0 0 0   9.  Suicidal or self-harm thoughts 0 0 0   PHQ-9 Total Score 9 7 2   Difficulty at work, home, or with people Somewhat difficult Not difficult at all -   1.  Little interest or pleasure in doing things - - Not at all   2.  Feeling down, depressed, or hopeless - - Not at all   3.  Trouble falling or staying asleep, or sleeping too much - - Several days   4.  Feeling tired or having little energy - - Several days   5.  Poor appetite or overeating - - Not at all   6.  Feeling bad about yourself - - Not at all   7.  Trouble concentrating - - Not at all   8.  Moving slowly or restless - - Not at all   9.  Suicidal or self-harm thoughts - - Not at all   PHQ-9 via [facility] TOTAL SCORE-----> - - 2 (Minimal depression)   Difficulty at work, home, or with people - - Somewhat difficult      RAUL-7   [employer], 2002; Used with Permission) 1/12/2018 1/3/2019 2/14/2020   1. Feeling nervous, anxious, or on edge - - Several days   2. Not being able to stop or control worrying - - Not at all   3. Worrying too much about different things - - Several days   4. Trouble relaxing - - Not at all   5. Being so restless that it is hard to sit still - - Not at all   6. Becoming easily annoyed or irritable - - Not at all   7. Feeling afraid, as if something awful might happen - - Several days   RAUL 7 TOTAL SCORE - - 3 (minimal anxiety)   1. Feeling nervous, anxious, or on edge 3 2 1   2. Not being able to stop or control worrying 3 2 0   3. Worrying too much about different things 3 1 1   4. Trouble relaxing 1 0 0   5. Being so restless that it is hard to sit still 0 0 0   6. Becoming easily annoyed or irritable 1 0 0   7. Feeling afraid, as if something awful might happen 1 1 1   RAUL-7 Total Score 12 6 3   If you checked any problems, how difficult have they made it for you to do your work, take care of things at home, or get along with other people? Somewhat difficult Not difficult at all -         EMBER LEVEL:  No flowsheet data found.    DATA  Extended Session (60+ minutes): No  Interactive Complexity: No  Crisis: No  [facility] Patient No    Treatment Objective(s) Addressed in This Session:  Target Behavior(s):  Depressed Mood: Increase interest, engagement, and pleasure in doing things  Decrease frequency and intensity of feeling down, depressed, hopeless  Improve quantity and quality of night time sleep / decrease daytime naps  Feel less tired and more energy during the day   Anxiety: will experience a reduction in anxiety, will develop more effective coping skills to manage anxiety symptoms and will develop healthy cognitive patterns and beliefs  Patient tearful presents in distress.    Current Stressors / Issues:    Therefore diagnostic assessment was not completed this time.  Initial focus was more on calming  "providing reframed to patient.    Patient reports increased anxiety and mood symptoms the past year.  Patient notified a clear stressor of her 's depression.  Patient reports 1 year ago her  was diagnosed with severe depression, suicidal and was referred to the [facility].  Patient reports he is now attending DBT weekly and amend support group and works part-time.    Patient reports she feels a pressure on her to keep the household running.  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  In addition to the financial responsibilities, patient also reports that she is her 's \"counselor\" as well is providing support to the 7-year-old daughter who is distress to the current coronavirus.    Patient tearful and frustrated.  Patient reports she is try to talk to her  we have Serena talk about \"it is all my fault\" for me\".  Explored with patient not so much the content but rather the functionality in the \"dance\" between herself and her  Nikki.  Patient began to realize that she is his \"fix her\".  Patient began to realize that she is enabling him.  Patient reports his therapist and DBT group keep recommending that he find a job and be more active but instead focuses on video games for most a day.  Patient notes that he will turn the issues away from her to himself.    Patient reports last week she went to the hospital overnight as she had a stroke.  Patient tearful feeling she cannot continue.  Validated and acknowledged patient's concern.  Role-play disc discussed different ways to express her own wellbeing but not blaming her judging her spouse.  Patient felt that her spouse feels is a failure as a  and may be asking for his support for her may be activating.  Patient recognize similar to her students, they are more able to help others and themselves.    Discussed with patient providing different mindfulness exercise to practice on just " noticing her thoughts feelings and physical sensations.  Patient felt this was helpful reframe.  Patient requested to continue to [facility] in 1 week.    [facility] will provide resources to patient via [employer].    Progress on Treatment Objective(s) / Homework:  Minimal progress - PREPARATION (Decided to change - considering how); Intervened by negotiating a change plan and determining options / strategies for behavior change, identifying triggers, exploring social supports, and working towards setting a date to begin behavior change    Motivational Interviewing    MI Intervention: Permission to raise concern or advise and Open-ended questions     Change Talk Expressed by the Patient: Reasons to change    Provider Response to Change Talk: E - Evoked more info from patient about behavior change, A - Affirmed patient's thoughts, decisions, or attempts at behavior change, R - Reflected patient's change talk and S - Summarized patient's change talk statements      MINDFULLNESS-BASED-STRATEGIES:  Discussed skills based on development and application of mindfulness, Skills drawn from dialectical behavior therapy, mindfulness-based stress reduction, mindfulness-based cognitive therapy, etc.    Care Plan review completed: No    Medication Review:  No current psychiatric medications prescribed    Medication Compliance:  NA    Changes in Health Issues:   None reported    Chemical Use Review:   Substance Use: Chemical use reviewed, no active concerns identified      Tobacco Use: No current tobacco use.      Assessment: Current Emotional / Mental Status (status of significant symptoms):    Risk status (Self / Other harm or suicidal ideation)  Patient denies current fears or concerns for personal safety.  Patient denies current or recent suicidal ideation or behaviors.  Patient denies current or recent homicidal ideation or behaviors.  Patient denies current or recent self injurious behavior or ideation.  Patient denies other safety  concerns.  A safety and risk management plan has not been developed at this time, however patient was encouraged to call [facility] should there be a change in any of these risk factors.    Appearance:   Na  Eye Contact:   na  Psychomotor Behavior: na  Attitude:   Cooperative   Orientation:   All  Speech   Rate / Production: Normal    Volume:  Soft   Mood:    Anxious   Affect:    Worrisome   Thought Content:  Clear   Thought Form:  Coherent   Insight:    Fair     Diagnoses:  1. Adjustment disorder with mixed anxiety and depressed mood        Collateral Reports Completed:  Routed note to PCP    Plan: (Homework, other):  Patient was given information about behavioral services and encouraged to schedule a follow up appointment with the clinic [facility] in 1 week.  She was also given information about mental health symptoms and treatment options .  CD Recommendations: No indications of CD issues.  MILI Nuñez, [facility]

## 2020-03-28 ENCOUNTER — E-VISIT (OUTPATIENT)
Dept: FAMILY MEDICINE | Facility: CLINIC | Age: 55
End: 2020-03-28
Payer: COMMERCIAL

## 2020-03-28 DIAGNOSIS — I10 HYPERTENSION GOAL BP (BLOOD PRESSURE) < 140/90: ICD-10-CM

## 2020-03-28 PROCEDURE — 99421 OL DIG E/M SVC 5-10 MIN: CPT | Performed by: PHYSICIAN ASSISTANT

## 2020-04-03 ENCOUNTER — MYC MEDICAL ADVICE (OUTPATIENT)
Dept: FAMILY MEDICINE | Facility: CLINIC | Age: 55
End: 2020-04-03

## 2020-04-03 NOTE — TELEPHONE ENCOUNTER
"phq9 would be great and offer telephone follow up next week.  BP looks wonderful, thank you  Lorrie \"Salvatore\" CHEYANNE Macias   "

## 2020-04-06 ENCOUNTER — MYC MEDICAL ADVICE (OUTPATIENT)
Dept: FAMILY MEDICINE | Facility: CLINIC | Age: 55
End: 2020-04-06

## 2020-04-06 NOTE — TELEPHONE ENCOUNTER
Writer responded as per below.    Separate [employer] message sent with PHQ-9 attached.  NANCY PerezN, RN

## 2020-04-07 ENCOUNTER — VIRTUAL VISIT (OUTPATIENT)
Dept: FAMILY MEDICINE | Facility: CLINIC | Age: 55
End: 2020-04-07
Payer: COMMERCIAL

## 2020-04-07 VITALS — WEIGHT: 195 LBS | BODY MASS INDEX: 31.34 KG/M2 | HEIGHT: 66 IN

## 2020-04-07 DIAGNOSIS — F33.0 MILD RECURRENT MAJOR DEPRESSION (H): ICD-10-CM

## 2020-04-07 DIAGNOSIS — I10 HYPERTENSION GOAL BP (BLOOD PRESSURE) < 140/90: Primary | ICD-10-CM

## 2020-04-07 DIAGNOSIS — I10 HYPERTENSION GOAL BP (BLOOD PRESSURE) < 140/90: ICD-10-CM

## 2020-04-07 PROCEDURE — 99213 OFFICE O/P EST LOW 20 MIN: CPT | Mod: TEL | Performed by: PHYSICIAN ASSISTANT

## 2020-04-07 PROCEDURE — 36415 COLL VENOUS BLD VENIPUNCTURE: CPT | Performed by: PHYSICIAN ASSISTANT

## 2020-04-07 PROCEDURE — 84132 ASSAY OF SERUM POTASSIUM: CPT | Performed by: PHYSICIAN ASSISTANT

## 2020-04-07 ASSESSMENT — MIFFLIN-ST. JEOR: SCORE: 1493.32

## 2020-04-07 NOTE — PROGRESS NOTES
"Subjective     Trini Ramirez is a 55 year old female who is being evaluated via a billable telephone visit.      The patient has been notified of following:     \"This telephone visit will be conducted via a call between you and your physician/provider. We have found that certain health care needs can be provided without the need for a physical exam.  This service lets us provide the care you need with a short phone conversation.  If a prescription is necessary we can send it directly to your pharmacy.  If lab work is needed we can place an order for that and you can then stop by our lab to have the test done at a later time.    If during the course of the call the physician/provider feels a telephone visit is not appropriate, you will not be charged for this service.\"     Patient has given verbal consent for Telephone visit?  Yes    Trini Ramirez complains of   Chief Complaint   Patient presents with     Hypertension       ALLERGIES  Seasonal allergies    Hypertension Follow-up      Do you check your blood pressure regularly outside of the clinic? YesAre you following a low salt diet? Yes    Are your blood pressures ever more than 140 on the top number (systolic) OR more   than 90 on the bottom number (diastolic), for example 140/90? Yes 121/80-90s - numbers continue to improve slowly/weekly.    Patient taking hydrochlorothiazide 25 mg and losartan 100 mg daily.  Patient eating potassium rich foods daily, but do to repeat potassium.      Abnormal Mood Symptoms       Duration: years    Description:  Depression: YES  Anxiety: YES  Panic attacks: no      Accompanying signs and symptoms: see PHQ-9 and RAUL scores    History (similar episodes/previous evaluation): None    Precipitating or alleviating factors: recent CVA    Therapies tried and outcome: Zoloft (Sertraline) 200 mg daily with overall good results and seems to be well controlled. Also takes Trazodone 50 mg nightly.    Last week was harder, but starting to " feel ok now.    No suicidal thoughts.    Plans to follow up with Ruy Cabral, Behavioral Health Consultant as well.      Patient Active Problem List   Diagnosis     Major depressive disorder, single episode in full remission (H)     CARDIOVASCULAR SCREENING; LDL GOAL LESS THAN 160     Seasonal allergic rhinitis     Metatarsalgia of right foot     Hypertension goal BP (blood pressure) < 140/90     Other closed extra-articular fracture of distal end of left radius, initial encounter     Stroke (H)     Past Surgical History:   Procedure Laterality Date     AS TMJ ARTHROSCOPY/SURGERY        SECTION       OPEN REDUCTION INTERNAL FIXATION WRIST Left 2016    Procedure: OPEN REDUCTION INTERNAL FIXATION WRIST;  Surgeon: Fritz Reid MD;  Location: [facility] OR       Social History     Tobacco Use     Smoking status: Never Smoker     Smokeless tobacco: Never Used   Substance Use Topics     Alcohol use: Yes     Alcohol/week: 0.0 standard drinks     Frequency: Monthly or less     Drinks per session: 1 or 2     Binge frequency: Never     Comment: occ     Family History   Problem Relation Age of Onset     Depression Mother      Hypertension Father      Depression Father      Substance Abuse Father      Cerebrovascular Disease Father 85     Diabetes Maternal Grandfather      Hypertension Brother      Depression Brother      Substance Abuse Brother      Mental Illness Brother          Current Outpatient Medications   Medication Sig Dispense Refill     aspirin (ASA) 81 MG EC tablet Take 1 tablet (81 mg) by mouth daily for 22 days 21 tablet 0     atorvastatin (LIPITOR) 80 MG tablet 1 tablet (80 mg) by Oral or NG Tube route daily 30 tablet 0     clopidogrel (PLAVIX) 75 MG tablet 1 tablet (75 mg) by Oral or NG Tube route daily for 21 days 21 tablet 0     fluticasone (FLONASE) 50 MCG/ACT nasal spray Spray 1-2 sprays into both nostrils daily as needed for rhinitis or allergies       hydrochlorothiazide (HYDRODIURIL)  "25 MG tablet Take 1 tablet (25 mg) by mouth At Bedtime 90 tablet 1     hydrOXYzine (VISTARIL) 50 MG capsule Take 50 mg by mouth At Bedtime       losartan (COZAAR) 100 MG tablet Take 100 mg by mouth At Bedtime       sertraline (ZOLOFT) 100 MG tablet Take 200 mg by mouth At Bedtime       traZODone (DESYREL) 50 MG tablet Take 50 mg by mouth At Bedtime       [START ON 4/10/2020] aspirin (ASA) 325 MG EC tablet Take 1 tablet (325 mg) by mouth daily (Patient not taking: Reported on 4/7/2020) 30 tablet 0     Allergies   Allergen Reactions     Seasonal Allergies      Recent Labs   Lab Test 03/19/20  1138 03/18/20  1003 02/14/20  1527 01/03/19  0941   A1C  --  5.3  --   --    LDL  --  208* 180* 183*   HDL  --  40* 37* 46*   TRIG  --  159* 158* 139   ALT 36  --  34 37   CR 0.78  Canceled, Test credited 0.92 0.74 0.77   GFRESTIMATED 86  Canceled, Test credited 71 >90 88   GFRESTBLACK >90  Canceled, Test credited 82 >90 >90   POTASSIUM 3.8  Canceled, Test credited 3.2* 3.8 3.9   TSH  --   --  3.85 1.99      BP Readings from Last 3 Encounters:   03/24/20 (!) 137/108   03/19/20 (!) 171/104   02/14/20 (!) 154/106    Wt Readings from Last 3 Encounters:   04/07/20 88.5 kg (195 lb)   03/18/20 93 kg (205 lb)   02/14/20 92.1 kg (203 lb)           Reviewed and updated as needed this visit by Provider  Tobacco  Allergies  Meds  Problems  Med Hx  Surg Hx  Fam Hx         Review of Systems   ROS COMP: Constitutional, HEENT, cardiovascular, pulmonary, GI, , musculoskeletal, neuro, skin, endocrine and psych systems are negative, except as otherwise noted.       Objective   Reported vitals:  Ht 1.664 m (5' 5.5\")   Wt 88.5 kg (195 lb)   BMI 31.96 kg/m     healthy, alert and no distress  Psych: Alert and oriented times 3; coherent speech, normal   rate and volume, able to articulate logical thoughts, able   to abstract reason, no tangential thoughts, no hallucinations   or delusions  Her affect is bright.     Diagnostic Test " Results:  Labs reviewed in Epic        Assessment/Plan:  1. Hypertension goal BP (blood pressure) < 140/90  Improving - taking medicine daily with no issues; rechecking potassium this afternoon.    2. Mild recurrent major depression (H)  Long standing, chronic, controlled at this time - has ups/downs, but overall feeling good with hopes to follow up with Ruy Cabral, Behavioral Health Consultant.      Return in about 3 months (around 7/7/2020) for Routine visit, or sooner with worsening symptoms.      Phone call duration:  11 minutes    Lorrie Macias PA-C

## 2020-04-08 LAB — POTASSIUM SERPL-SCNC: 3.4 MMOL/L (ref 3.4–5.3)

## 2020-04-09 NOTE — RESULT ENCOUNTER NOTE
"Sol Feliz  Your attached potassium levels are within normal limits - keep up the good work!  Please contact the office with any questions or concerns.    Lorrie Ball \"Salvatore\" CHEYANNE Macias    "

## 2020-04-25 DIAGNOSIS — J20.9 ACUTE BRONCHITIS, UNSPECIFIED ORGANISM: ICD-10-CM

## 2020-04-27 RX ORDER — ALBUTEROL SULFATE 90 UG/1
AEROSOL, METERED RESPIRATORY (INHALATION)
Qty: 18 G | Refills: 0 | Status: SHIPPED | OUTPATIENT
Start: 2020-04-27 | End: 2020-07-27

## 2020-05-04 ENCOUNTER — HOSPITAL ENCOUNTER (OUTPATIENT)
Dept: PHYSICAL THERAPY | Facility: CLINIC | Age: 55
Setting detail: THERAPIES SERIES
End: 2020-05-04
Attending: INTERNAL MEDICINE
Payer: COMMERCIAL

## 2020-05-04 PROCEDURE — 97110 THERAPEUTIC EXERCISES: CPT | Mod: GP,GT | Performed by: PHYSICAL THERAPIST

## 2020-05-04 PROCEDURE — 97112 NEUROMUSCULAR REEDUCATION: CPT | Mod: GP,95 | Performed by: PHYSICAL THERAPIST

## 2020-05-04 NOTE — PROGRESS NOTES
Trini Ramirez is a 55 year old female who is being seen via a billable video visit.      Patient has given verbal consent for Video visit? Yes    Video Start Time: 1108    Telehealth Visit Details    Type of Service:  Telehealth    Video End Time (time video stopped): 1130    Originating Location (pt. location): Home    Additional Participants in Telehealth Visit: none    Distant Location (provider location):  [facility], [place], PHYSICAL THERAPY - OUTPATIENT     Mode of Communication (Audio Visual or Audio Only):  both    Barbara Kenney, PT  May 4, 2020

## 2020-05-07 NOTE — PROGRESS NOTES
Patient was not examined by me.  Chart and note reviewed.      Francisco Hart MD  [facility]

## 2020-07-06 DIAGNOSIS — J98.09 BRONCHOSPASTIC AIRWAY DISEASE: ICD-10-CM

## 2020-07-09 ENCOUNTER — MYC MEDICAL ADVICE (OUTPATIENT)
Dept: FAMILY MEDICINE | Facility: CLINIC | Age: 55
End: 2020-07-09

## 2020-07-15 DIAGNOSIS — J98.09 BRONCHOSPASTIC AIRWAY DISEASE: ICD-10-CM

## 2020-07-17 NOTE — TELEPHONE ENCOUNTER
LM to [facility]-THis medication was discontinued-Is she taking this? Oanh Sprigner RN

## 2020-07-20 NOTE — TELEPHONE ENCOUNTER
Trini-Please set up a virtual visit with Salvatore Macias to discuss a plan for treatment. Oanh Springer RN    Last read by Trini Ramirez at 3:11 PM on 7/17/2020.   Trini Ramirez   to Me            2:42 PM   No, I used to take Advair, but haven't needed it for a long time.  We got a dog this spring, and I'm having allergy problems so I was hoping to get a refill of that

## 2020-07-27 ENCOUNTER — VIRTUAL VISIT (OUTPATIENT)
Dept: FAMILY MEDICINE | Facility: CLINIC | Age: 55
End: 2020-07-27
Payer: COMMERCIAL

## 2020-07-27 VITALS — BODY MASS INDEX: 30.7 KG/M2 | HEIGHT: 66 IN | WEIGHT: 191 LBS

## 2020-07-27 DIAGNOSIS — J30.81 ALLERGY TO DOG DANDER: Primary | ICD-10-CM

## 2020-07-27 DIAGNOSIS — J98.09 BRONCHOSPASTIC AIRWAY DISEASE: ICD-10-CM

## 2020-07-27 PROCEDURE — 99213 OFFICE O/P EST LOW 20 MIN: CPT | Mod: GT | Performed by: PHYSICIAN ASSISTANT

## 2020-07-27 RX ORDER — MONTELUKAST SODIUM 10 MG/1
10 TABLET ORAL AT BEDTIME
Qty: 90 TABLET | Refills: 3 | Status: SHIPPED | OUTPATIENT
Start: 2020-07-27 | End: 2021-08-05

## 2020-07-27 RX ORDER — ALBUTEROL SULFATE 90 UG/1
AEROSOL, METERED RESPIRATORY (INHALATION)
Qty: 18 G | Refills: 3 | Status: SHIPPED | OUTPATIENT
Start: 2020-07-27 | End: 2022-07-18

## 2020-07-27 ASSESSMENT — PATIENT HEALTH QUESTIONNAIRE - PHQ9: SUM OF ALL RESPONSES TO PHQ QUESTIONS 1-9: 5

## 2020-07-27 ASSESSMENT — MIFFLIN-ST. JEOR: SCORE: 1483.12

## 2020-07-27 NOTE — PROGRESS NOTES
"Trini Ramirez is a 55 year old female who is being evaluated via a billable video visit.      The patient has been notified of following:     \"This video visit will be conducted via a call between you and your physician/provider. We have found that certain health care needs can be provided without the need for an in-person physical exam.  This service lets us provide the care you need with a video conversation.  If a prescription is necessary we can send it directly to your pharmacy.  If lab work is needed we can place an order for that and you can then stop by our lab to have the test done at a later time.    Video visits are billed at different rates depending on your insurance coverage.  Please reach out to your insurance provider with any questions.    If during the course of the call the physician/provider feels a video visit is not appropriate, you will not be charged for this service.\"    Patient has given verbal consent for Video visit? Yes  How would you like to obtain your AVS? MyChart  Will anyone else be joining your video visit? No    Subjective     Trini Ramirez is a 55 year old female who presents today via video visit for the following health issues:    HPI    ALLERGIES      Duration: 2 months- got a new dog     Description:   Nasal congestion: no  Sneezing: no  Red, itchy eyes: no    Accompanying signs and symptoms: tight cough    History (similar episodes/allergy testing): None    Precipitating or alleviating factors: None    Therapies tried and outcome: inhaler, zyrtec- helps     COVID test was negative when started the cough          Video Start Time: 8:41 AM        Patient Active Problem List   Diagnosis     Major depressive disorder, single episode in full remission (H)     CARDIOVASCULAR SCREENING; LDL GOAL LESS THAN 160     Seasonal allergic rhinitis     Metatarsalgia of right foot     Hypertension goal BP (blood pressure) < 140/90     Other closed extra-articular fracture of distal end of " left radius, initial encounter     Stroke (H)     Past Surgical History:   Procedure Laterality Date     AS TMJ ARTHROSCOPY/SURGERY        SECTION       OPEN REDUCTION INTERNAL FIXATION WRIST Left 2016    Procedure: OPEN REDUCTION INTERNAL FIXATION WRIST;  Surgeon: Fritz Reid MD;  Location: [facility] OR       Social History     Tobacco Use     Smoking status: Never Smoker     Smokeless tobacco: Never Used   Substance Use Topics     Alcohol use: Yes     Alcohol/week: 0.0 standard drinks     Frequency: Monthly or less     Drinks per session: 1 or 2     Binge frequency: Never     Comment: occ     Family History   Problem Relation Age of Onset     Depression Mother      Hypertension Father      Depression Father      Substance Abuse Father      Cerebrovascular Disease Father 85     Diabetes Maternal Grandfather      Hypertension Brother      Depression Brother      Substance Abuse Brother      Mental Illness Brother          Current Outpatient Medications   Medication Sig Dispense Refill     atorvastatin (LIPITOR) 80 MG tablet Take 1 tablet (80 mg) by mouth daily 90 tablet 1     fluticasone (FLONASE) 50 MCG/ACT nasal spray Spray 1-2 sprays into both nostrils daily as needed for rhinitis or allergies       fluticasone-salmeterol (ADVAIR DISKUS) 250-50 MCG/DOSE inhaler Inhale 1 puff into the lungs every 12 hours 1 Inhaler 3     hydrochlorothiazide (HYDRODIURIL) 25 MG tablet Take 1 tablet (25 mg) by mouth At Bedtime 90 tablet 1     hydrOXYzine (VISTARIL) 50 MG capsule Take 50 mg by mouth At Bedtime       losartan (COZAAR) 100 MG tablet Take 100 mg by mouth At Bedtime       montelukast (SINGULAIR) 10 MG tablet Take 1 tablet (10 mg) by mouth At Bedtime 90 tablet 3     sertraline (ZOLOFT) 100 MG tablet Take 200 mg by mouth At Bedtime       traZODone (DESYREL) 50 MG tablet Take 50 mg by mouth At Bedtime       VENTOLIN  (90 Base) MCG/ACT inhaler INHALE 2 PUFFS INTO THE LUNGS EVERY 4 HOURS AS NEEDED  18 g 3     Allergies   Allergen Reactions     Seasonal Allergies      Recent Labs   Lab Test 04/07/20  1539 03/19/20  1138 03/18/20  1003 02/14/20  1527 01/03/19  0941   A1C  --   --  5.3  --   --    LDL  --   --  208* 180* 183*   HDL  --   --  40* 37* 46*   TRIG  --   --  159* 158* 139   ALT  --  36  --  34 37   CR  --  0.78  Canceled, Test credited 0.92 0.74 0.77   GFRESTIMATED  --  86  Canceled, Test credited 71 >90 88   GFRESTBLACK  --  >90  Canceled, Test credited 82 >90 >90   POTASSIUM 3.4 3.8  Canceled, Test credited 3.2* 3.8 3.9   TSH  --   --   --  3.85 1.99      BP Readings from Last 3 Encounters:   03/24/20 (!) 137/108   03/19/20 (!) 171/104   02/14/20 (!) 154/106    Wt Readings from Last 3 Encounters:   07/27/20 86.6 kg (191 lb)   04/07/20 88.5 kg (195 lb)   03/18/20 93 kg (205 lb)                    Reviewed and updated as needed this visit by Provider  Tobacco  Allergies  Meds  Problems  Med Hx  Surg Hx  Fam Hx         Review of Systems   Constitutional, HEENT, cardiovascular, pulmonary, GI, , musculoskeletal, neuro, skin, endocrine and psych systems are negative, except as otherwise noted.      Objective             Physical Exam     GENERAL: Healthy, alert and no distress  EYES: Eyes grossly normal to inspection.  No discharge or erythema, or obvious scleral/conjunctival abnormalities.  RESP: No audible wheeze, cough, or visible cyanosis.  No visible retractions or increased work of breathing.    SKIN: Visible skin clear. No significant rash, abnormal pigmentation or lesions.  NEURO: Cranial nerves grossly intact.  Mentation and speech appropriate for age.  PSYCH: Mentation appears normal, affect normal/bright, judgement and insight intact, normal speech and appearance well-groomed.      Diagnostic Test Results:  Labs reviewed in Epic        Assessment & Plan       ICD-10-CM    1. Allergy to dog dander  J30.81 VENTOLIN  (90 Base) MCG/ACT inhaler     montelukast (SINGULAIR) 10 MG  tablet     fluticasone-salmeterol (ADVAIR DISKUS) 250-50 MCG/DOSE inhaler   2. Bronchospastic airway disease  J98.09             Return in about 3 months (around 10/27/2020) for Routine visit, or sooner with worsening symptoms.    Lorrie Macias PA-C  [facility]      Video-Visit Details    Type of service:  Video Visit    Video End Time:8:53 AM    Originating Location (pt. Location): Home    Distant Location (provider location):  [facility]     Platform used for Video Visit: AmWell and doximity    Return in about 3 months (around 10/27/2020) for Routine visit, or sooner with worsening symptoms.       Lorrie Macias PA-C

## 2020-08-07 DIAGNOSIS — I10 HYPERTENSION GOAL BP (BLOOD PRESSURE) < 140/90: Primary | ICD-10-CM

## 2020-08-11 RX ORDER — LOSARTAN POTASSIUM 100 MG/1
TABLET ORAL
Qty: 90 TABLET | Refills: 3 | Status: SHIPPED | OUTPATIENT
Start: 2020-08-11 | End: 2021-08-05

## 2020-08-11 NOTE — TELEPHONE ENCOUNTER
Routing refill request to provider for review/approval because:  --Blood pressures not at goal.        --Last Office or Virtual visit: 7/27/2020   --Future Office Visit:  NONE      BP Readings from Last 6 Encounters:   03/24/20 (!) 137/108   03/19/20 (!) 171/104   02/14/20 (!) 154/106   01/03/19 136/82   06/14/18 138/86   01/12/18 (!) 161/112

## 2020-09-21 NOTE — PROGRESS NOTES
Outpatient Physical Therapy Discharge Note     Patient: Trini Ramirez  : 1965    Beginning/End Dates of Reporting Period:  3/23/2020 to 2020 (2 visits)    Referring Provider: Noemi Dunbar (hospitalist), Alyssa Macias (PCP)    Therapy Diagnosis: impaired gait and balance     Client Self Report: video visit;  bp is good.  135/85.  doing it daily.   I am doing well.  walking outside.  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  my L toes are stiff and don't move as well.  walking outside 5x/wk.      Goals:  Goal Identifier FGA   Goal Description Pt demo decreased risk for falls via FGA >2430.   Target Date 20   Date Met   NOT MET   Progress: Was not retested.     Goal Identifier 6MWT   Goal Description Pt demo improved improved LE strength and enduance for community ambulation >1596' (20% improved).   Target Date 20   Date Met   NOT MET   Progress: Was not retested.     Goal Identifier HEP   Goal Description Pt demo indep with HEP for LE strength and endurance, for ongoing wellness in context of new CVA.   Target Date 20   Date Met   PARTIALLY MET   Progress: Pt was working on recommended HEP at last attended telehealth visit.       Progress Toward Goals:   Progress limited due to limited attendance due to COVID-19 clinic restrictions. Pt did not choose to return to clinic once clinic had re-opened fully in 2020.    Plan:  Discharge from therapy.    Discharge:    Reason for Discharge: Patient has failed to schedule further appointments.    Discharge Plan: Patient to continue home program. Return to clinic with new referral as appropriate.

## 2020-10-19 DIAGNOSIS — I63.9 CEREBROVASCULAR ACCIDENT (CVA), UNSPECIFIED MECHANISM (H): ICD-10-CM

## 2020-10-19 NOTE — LETTER
October 22, 2020      Trini Ramirez  [address]  [place] [address]        To Whom It May Concern,        In order to ensure we are providing the best quality care, we have reviewed your chart and see that you are due for a follow-up appointment and a fasting labs.    We have also sent your medication to your pharmacy. For future refills on this medication, please contact the clinic to schedule the above appointment. This can be requested via [employer]ius or by calling the clinic at [phone].    We greatly appreciate the opportunity to serve you. Thank you for trusting us with your health care.      Sincerely,    Your care team at [facility]

## 2020-10-22 DIAGNOSIS — I63.9 CEREBROVASCULAR ACCIDENT (CVA), UNSPECIFIED MECHANISM (H): ICD-10-CM

## 2020-10-22 RX ORDER — ATORVASTATIN CALCIUM 80 MG/1
TABLET, FILM COATED ORAL
Qty: 90 TABLET | Refills: 0 | Status: SHIPPED | OUTPATIENT
Start: 2020-10-22 | End: 2020-11-30

## 2020-10-22 RX ORDER — ATORVASTATIN CALCIUM 80 MG/1
80 TABLET, FILM COATED ORAL DAILY
Qty: 30 TABLET | Refills: 0 | Status: SHIPPED | OUTPATIENT
Start: 2020-10-22 | End: 2020-10-22

## 2020-10-22 NOTE — TELEPHONE ENCOUNTER
Pt due in for appointment and for fasting lipids  B/p check  Please call and schedule  Thanks!     Missy Resendez RN

## 2020-10-22 NOTE — TELEPHONE ENCOUNTER
Medication is being filled for 1 time refill only due to:  Patient needs to be seen because Med check and chonic disease check. .

## 2020-11-30 ENCOUNTER — OFFICE VISIT (OUTPATIENT)
Dept: FAMILY MEDICINE | Facility: CLINIC | Age: 55
End: 2020-11-30
Payer: COMMERCIAL

## 2020-11-30 VITALS
TEMPERATURE: 98 F | RESPIRATION RATE: 18 BRPM | WEIGHT: 188 LBS | HEART RATE: 95 BPM | DIASTOLIC BLOOD PRESSURE: 88 MMHG | OXYGEN SATURATION: 97 % | SYSTOLIC BLOOD PRESSURE: 128 MMHG | BODY MASS INDEX: 30.34 KG/M2

## 2020-11-30 DIAGNOSIS — I63.9 CEREBROVASCULAR ACCIDENT (CVA), UNSPECIFIED MECHANISM (H): ICD-10-CM

## 2020-11-30 DIAGNOSIS — I10 HYPERTENSION GOAL BP (BLOOD PRESSURE) < 140/90: ICD-10-CM

## 2020-11-30 PROCEDURE — 82043 UR ALBUMIN QUANTITATIVE: CPT | Performed by: FAMILY MEDICINE

## 2020-11-30 PROCEDURE — 36415 COLL VENOUS BLD VENIPUNCTURE: CPT | Performed by: FAMILY MEDICINE

## 2020-11-30 PROCEDURE — 80061 LIPID PANEL: CPT | Performed by: FAMILY MEDICINE

## 2020-11-30 PROCEDURE — 99214 OFFICE O/P EST MOD 30 MIN: CPT | Performed by: FAMILY MEDICINE

## 2020-11-30 RX ORDER — ATORVASTATIN CALCIUM 80 MG/1
80 TABLET, FILM COATED ORAL DAILY
Qty: 90 TABLET | Refills: 1 | Status: SHIPPED | OUTPATIENT
Start: 2020-11-30 | End: 2021-06-03

## 2020-11-30 RX ORDER — HYDROCHLOROTHIAZIDE 25 MG/1
25 TABLET ORAL AT BEDTIME
Qty: 90 TABLET | Refills: 1 | Status: SHIPPED | OUTPATIENT
Start: 2020-11-30 | End: 2020-12-03

## 2020-11-30 NOTE — PROGRESS NOTES
"Subjective     Trini Ramirez is a 55 year old female who presents to clinic today for the following health issues:    HPI         Depression Followup    How are you doing with your depression since your last visit? { :943056::\"No change\"}    Are you having other symptoms that might be associated with depression? { :671786}    Have you had a significant life event?  { :368279}     Are you feeling anxious or having panic attacks?   { :169977}    Do you have any concerns with your use of alcohol or other drugs? { :873419}    Social History     Tobacco Use     Smoking status: Never Smoker     Smokeless tobacco: Never Used   Substance Use Topics     Alcohol use: Yes     Alcohol/week: 0.0 standard drinks     Frequency: Monthly or less     Drinks per session: 1 or 2     Binge frequency: Never     Comment: occ     Drug use: No     PHQ 1/3/2019 2/14/2020 7/27/2020   PHQ-9 Total Score 7 2 5   Q9: Thoughts of better off dead/self-harm past 2 weeks Not at all Not at all Not at all     RAUL-7 SCORE 1/12/2018 1/3/2019 2/14/2020   Total Score - - 3 (minimal anxiety)   Total Score 12 6 3     {Last PHQ9 or GAD7 Responses (Optional):203991}  {PROVIDER ONLY Complete follow-up questions for patients who report suicide ideation  (Optional):813098}    Suicide Assessment Five-step Evaluation and Treatment (SAFE-T)          {additonal problems for provider to add (Optional):110148}    Review of Systems   {ROS COMP (Optional):605651}      Objective    There were no vitals taken for this visit.  There is no height or weight on file to calculate BMI.  Physical Exam   {Exam List (Optional):550058}    {Diagnostic Test Results (Optional):397312}        {PROVIDER CHARTING PREFERENCE:843513}    "

## 2020-11-30 NOTE — PROGRESS NOTES
Subjective     Trini Ramirez is a 55 year old female who presents to clinic today for the following health issues:    HPI   Trini is here for refills Lipitor and hydrochlorothiazide. Hx of CVA 3/2020, tolerating stating well. Checks BP at home. Diastolic BP hovers around 90. No issues with hydrochlorothiazide.     Hyperlipidemia Follow-Up      Are you regularly taking any medication or supplement to lower your cholesterol?   Yes- statin    Are you having muscle aches or other side effects that you think could be caused by your cholesterol lowering medication?  No    Hypertension Follow-up      Do you check your blood pressure regularly outside of the clinic? Yes 130/90 this morning at home.     Are you following a low salt diet? Yes    Are your blood pressures ever more than 140 on the top number (systolic) OR more   than 90 on the bottom number (diastolic), for example 140/90? No       BP Readings from Last 2 Encounters:   11/30/20 128/88   03/24/20 (!) 137/108     Hemoglobin A1C (%)   Date Value   03/18/2020 5.3     LDL Cholesterol Calculated (mg/dL)   Date Value   03/18/2020 208 (H)   02/14/2020 180 (H)         How many servings of fruits and vegetables do you eat daily?  0-1    On average, how many sweetened beverages do you drink each day (Examples: soda, juice, sweet tea, etc.  Do NOT count diet or artificially sweetened beverages)?   0    How many days per week do you exercise enough to make your heart beat faster? 7    How many minutes a day do you exercise enough to make your heart beat faster? 20 - 29    How many days per week do you miss taking your medication? 0    Review of Systems   CONSTITUTIONAL: NEGATIVE for fever, chills, change in weight  EYES: NEGATIVE for vision changes or irritation  RESP: NEGATIVE for significant cough or SOB  CV: NEGATIVE for chest pain, palpitations or peripheral edema  NEURO: NEGATIVE for weakness, dizziness or paresthesias      Objective    /88 (BP Location: Left  arm, Patient Position: Sitting, Cuff Size: Adult Regular)   Pulse 95   Temp 98  F (36.7  C) (Tympanic)   Resp 18   Wt 85.3 kg (188 lb)   SpO2 97%   BMI 30.34 kg/m    Body mass index is 30.34 kg/m .  Physical Exam   GENERAL: healthy, alert and no distress  EYES: Eyes grossly normal to inspection, conjunctivae and sclerae normal  RESP: lungs clear to auscultation - no rales, rhonchi or wheezes  CV: regular rate and rhythm, normal S1 S2, no S3 or S4, no murmur, click or rub, no peripheral edema and peripheral pulses strong  PSYCH: mentation appears normal, affect normal/bright    Assessment & Plan     Trini was seen today for hypertension and lipids.    Diagnoses and all orders for this visit:    Hypertension goal BP (blood pressure) < 140/90 -at goal, continue to monitor diastolic number  -     Lipid panel reflex to direct LDL Fasting  -     Albumin Random Urine Quantitative with Creat Ratio, to monitor kidney function  -     hydrochlorothiazide (HYDRODIURIL) 25 MG tablet; Take 1 tablet (25 mg) by mouth At Bedtime    Cerebrovascular accident (CVA), unspecified mechanism (H)  -     Lipid panel reflex to direct LDL Fasting  -     atorvastatin (LIPITOR) 80 MG tablet; Take 1 tablet (80 mg) by mouth daily    Return in about 11 weeks (around 2/15/2021) for Routine preventive.    Ty Root DO  [facility]

## 2020-12-01 DIAGNOSIS — I10 HYPERTENSION GOAL BP (BLOOD PRESSURE) < 140/90: ICD-10-CM

## 2020-12-01 LAB
CHOLEST SERPL-MCNC: 164 MG/DL
CREAT UR-MCNC: 236 MG/DL
HDLC SERPL-MCNC: 47 MG/DL
LDLC SERPL CALC-MCNC: 88 MG/DL
MICROALBUMIN UR-MCNC: 31 MG/L
MICROALBUMIN/CREAT UR: 13.26 MG/G CR (ref 0–25)
NONHDLC SERPL-MCNC: 117 MG/DL
TRIGL SERPL-MCNC: 144 MG/DL

## 2020-12-03 RX ORDER — HYDROCHLOROTHIAZIDE 25 MG/1
TABLET ORAL
Qty: 90 TABLET | Refills: 1 | Status: SHIPPED | OUTPATIENT
Start: 2020-12-03 | End: 2021-06-01

## 2021-01-11 ENCOUNTER — MYC MEDICAL ADVICE (OUTPATIENT)
Dept: FAMILY MEDICINE | Facility: CLINIC | Age: 56
End: 2021-01-11

## 2021-02-02 DIAGNOSIS — F33.0 MILD RECURRENT MAJOR DEPRESSION (H): Primary | ICD-10-CM

## 2021-02-03 DIAGNOSIS — G47.00 INSOMNIA, UNSPECIFIED TYPE: Primary | ICD-10-CM

## 2021-02-03 RX ORDER — SERTRALINE HYDROCHLORIDE 100 MG/1
TABLET, FILM COATED ORAL
Qty: 180 TABLET | Refills: 0 | Status: SHIPPED | OUTPATIENT
Start: 2021-02-03 | End: 2021-04-19

## 2021-02-03 RX ORDER — TRAZODONE HYDROCHLORIDE 50 MG/1
TABLET, FILM COATED ORAL
Qty: 180 TABLET | Refills: 0 | Status: SHIPPED | OUTPATIENT
Start: 2021-02-03 | End: 2021-05-10

## 2021-02-03 NOTE — TELEPHONE ENCOUNTER
Routing refill request to provider for review/approval because:  Medication is reported/historical  aMry KAUFFMAN RN

## 2021-02-03 NOTE — TELEPHONE ENCOUNTER
Routing refill request to provider for review/approval because:  Medication is reported/historical  Mary KAUFFMAN RN

## 2021-02-17 ENCOUNTER — TELEPHONE (OUTPATIENT)
Dept: FAMILY MEDICINE | Facility: CLINIC | Age: 56
End: 2021-02-17

## 2021-02-17 NOTE — TELEPHONE ENCOUNTER
Patient Quality Outreach      Summary:    Patient has the following on her problem list/HM: None    Patient is due/failing the following:   Breast Cancer Screening - Mammogram    Type of outreach:    Sent [employer] message.    Questions for provider review:    None                                                                                     Mary KAUFFMAN RN

## 2021-03-18 DIAGNOSIS — Z12.11 ENCOUNTER FOR COLORECTAL CANCER SCREENING: ICD-10-CM

## 2021-03-18 DIAGNOSIS — Z12.12 ENCOUNTER FOR COLORECTAL CANCER SCREENING: ICD-10-CM

## 2021-03-19 NOTE — PROGRESS NOTES
This encounter was created solely for the purpose of releasing the future order that was placed for Cologuard.  This is a necessary step in order for the results to be abstracted once they are available.  Gabi Lovell

## 2021-04-10 LAB — COLOGUARD-ABSTRACT: NEGATIVE

## 2021-04-11 ENCOUNTER — HEALTH MAINTENANCE LETTER (OUTPATIENT)
Age: 56
End: 2021-04-11

## 2021-04-18 DIAGNOSIS — F33.0 MILD RECURRENT MAJOR DEPRESSION (H): ICD-10-CM

## 2021-04-19 ENCOUNTER — MYC REFILL (OUTPATIENT)
Dept: FAMILY MEDICINE | Facility: CLINIC | Age: 56
End: 2021-04-19

## 2021-04-19 DIAGNOSIS — F33.0 MILD RECURRENT MAJOR DEPRESSION (H): ICD-10-CM

## 2021-04-19 NOTE — TELEPHONE ENCOUNTER
Still seeing Salvatore Root new PCP?  Left message for patient to call [facility] back  When patient calls back please ask who PCP is so refill can go to correct provider   Mary KAUFFMAN RN

## 2021-04-20 RX ORDER — SERTRALINE HYDROCHLORIDE 100 MG/1
200 TABLET, FILM COATED ORAL DAILY
Start: 2021-04-20

## 2021-04-21 NOTE — TELEPHONE ENCOUNTER
MP,  Left  #2 for patient  See below messages  No response from patient to our outreach  You are still listed as PCP  Please advise on further refill  Thanks,  Mary KAUFFMAN RN

## 2021-04-22 RX ORDER — SERTRALINE HYDROCHLORIDE 100 MG/1
TABLET, FILM COATED ORAL
Qty: 60 TABLET | Refills: 0 | Status: SHIPPED | OUTPATIENT
Start: 2021-04-22 | End: 2021-05-21

## 2021-04-26 NOTE — RESULT ENCOUNTER NOTE
"Sol Feliz  Great news!  Your stool test was normal/negative for blood.  I recommend rechecking this test every three years in order to screen for colon cancer.      Alternatively, you can choose anytime to have a colonoscopy.  The prep is more difficult, but if the colonoscopy is normal, you don't need another one for 10 years.    If you have any questions, please contact the clinic or schedule an appointment with me, thank you!    Thanks  Lorrie \"Salvatore\" CHEYANNE Macias"

## 2021-05-21 DIAGNOSIS — F33.0 MILD RECURRENT MAJOR DEPRESSION (H): ICD-10-CM

## 2021-05-21 RX ORDER — SERTRALINE HYDROCHLORIDE 100 MG/1
TABLET, FILM COATED ORAL
Start: 2021-05-21

## 2021-05-22 DIAGNOSIS — G47.00 INSOMNIA, UNSPECIFIED TYPE: ICD-10-CM

## 2021-05-24 RX ORDER — TRAZODONE HYDROCHLORIDE 50 MG/1
50-100 TABLET, FILM COATED ORAL AT BEDTIME
Qty: 30 TABLET | Refills: 0 | Status: SHIPPED | OUTPATIENT
Start: 2021-05-24 | End: 2021-06-07

## 2021-06-01 DIAGNOSIS — I10 HYPERTENSION GOAL BP (BLOOD PRESSURE) < 140/90: ICD-10-CM

## 2021-06-02 DIAGNOSIS — I63.9 CEREBROVASCULAR ACCIDENT (CVA), UNSPECIFIED MECHANISM (H): ICD-10-CM

## 2021-06-03 RX ORDER — HYDROCHLOROTHIAZIDE 25 MG/1
TABLET ORAL
Qty: 90 TABLET | Refills: 0 | Status: SHIPPED | OUTPATIENT
Start: 2021-06-03 | End: 2021-08-05

## 2021-06-03 RX ORDER — ATORVASTATIN CALCIUM 80 MG/1
80 TABLET, FILM COATED ORAL DAILY
Qty: 90 TABLET | Refills: 0 | Status: SHIPPED | OUTPATIENT
Start: 2021-06-03 | End: 2021-08-05

## 2021-06-03 NOTE — TELEPHONE ENCOUNTER
,  --Please contact patient and ask to schedule a routine preventive visit as planned in last visit..    --She can get annual labs done at that time.      ---Prescription approved per [facility] Refill Protocol.       Ynes Maurer RN BSN     [facility]                       --Last visit:  11/30/2020 plan per [place] was to RTC 11 weeks ~ 2/15/21 for routine preventative.

## 2021-06-03 NOTE — TELEPHONE ENCOUNTER
A.S  Please advise in MP's absence:    Routing refill request to provider for review/approval because:  Drug prescribed by outside provider      Atorvastatin       Last Written Prescription Date:  11/30/20  Last Fill Quantity: 90,   # refills: 1  Last Office Visit: 11/30/20 Lauren, virtual 7/27/20 Colleen  Future Office visit:       Due for physical

## 2021-08-04 NOTE — PROGRESS NOTES
Assessment & Plan     Encounter for screening for cervical cancer   - Pap thin layer screen with HPV - recommended age 30 - 65 years    Mild recurrent major depression (H)  Long standing, chronic, controlled, but could be better - add wellbutrin and regular refills sent to pharmacy.  - EMOTIONAL / BEHAVIORAL ASSESSMENT  - buPROPion (WELLBUTRIN XL) 150 MG 24 hr tablet; Take 1 tablet (150 mg) by mouth every morning  - sertraline (ZOLOFT) 100 MG tablet; TAKE 2 TABLETS(200 MG) BY MOUTH DAILY  - hydrOXYzine (VISTARIL) 50 MG capsule; Take 1 capsule (50 mg) by mouth At Bedtime    Insomnia, unspecified type  Long standing, chronic, controlled-  Regular refills sent to pharm  - traZODone (DESYREL) 50 MG tablet; TAKE 1 TO 2 TABLETS(50  MG) BY MOUTH AT BEDTIME    Hypertension goal BP (blood pressure) < 140/90  Long standing, chronic, controlled-  Combine losartan and hydrochlorothiazide into one pill, labs updated and continue to work on healthy diet and exercise.  - Comprehensive metabolic panel; Future  - losartan (COZAAR) 100 MG tablet; Take 1 tablet (100 mg) by mouth daily  - hydrochlorothiazide (HYDRODIURIL) 25 MG tablet; TAKE 1 TABLET(25 MG) BY MOUTH AT BEDTIME  - losartan-hydrochlorothiazide (HYZAAR) 100-25 MG tablet; Take 1 tablet by mouth daily  - Comprehensive metabolic panel    Cerebrovascular accident (CVA), unspecified mechanism (H)  Long standing, chronic, controlled-  Regular refills sent to pharmacy.  - atorvastatin (LIPITOR) 80 MG tablet; Take 1 tablet (80 mg) by mouth daily    Allergy to dog dander  - montelukast (SINGULAIR) 10 MG tablet; Take 1 tablet (10 mg) by mouth At Bedtime    Screening for thyroid disorder  - TSH with free T4 reflex; Future  - TSH with free T4 reflex    Screening, anemia, deficiency, iron  - CBC with Platelets & Differential; Future  - CBC with Platelets & Differential    Encounter for screening mammogram for breast cancer  - *MA Screening Digital Bilateral;  Future    Encounter for hepatitis C screening test for low risk patient  - Hepatitis C Screen Reflex to HCV RNA Quant and Genotype; Future  - Hepatitis C Screen Reflex to HCV RNA Quant and Genotype    Review of prior external note(s) from - previous routine visits  20 minutes spent on the date of the encounter doing chart review, history and exam, documentation and further activities per the note       There are no Patient Instructions on file for this visit.    Return in about 3 months (around 11/5/2021) for Follow up, using virtual (video/phone) visit, or sooner with worsening symptoms.    Lorrie Macias PA-C  [facility]    Denton Feliz is a 56 year old who presents for the following health issues     HPI     Answers for HPI/ROS submitted by the patient on 8/5/2021  If you checked off any problems, how difficult have these problems made it for you to do your work, take care of things at home, or get along with other people?: Somewhat difficult  PHQ9 TOTAL SCORE: 5  RAUL 7 TOTAL SCORE: 5        Depression and Anxiety Follow-Up    How are you doing with your depression since your last visit? Worsened     How are you doing with your anxiety since your last visit?  Worsened     Are you having other symptoms that might be associated with depression or anxiety? No    Have you had a significant life event? OTHER:  is in the hospital and daughter is leaving for college in 1 week      Do you have any concerns with your use of alcohol or other drugs? No    Social History     Tobacco Use     Smoking status: Never Smoker     Smokeless tobacco: Never Used   Substance Use Topics     Alcohol use: Yes     Alcohol/week: 0.0 standard drinks     Comment: occ     Drug use: No     PHQ 2/14/2020 7/27/2020 8/5/2021   PHQ-9 Total Score 2 5 5   Q9: Thoughts of better off dead/self-harm past 2 weeks Not at all Not at all Not at all     RAUL-7 SCORE 1/3/2019 2/14/2020 8/5/2021   Total Score - 3  (minimal anxiety) 5 (mild anxiety)   Total Score 6 3 5     Last PHQ-9 8/5/2021   1.  Little interest or pleasure in doing things 1   2.  Feeling down, depressed, or hopeless 1   3.  Trouble falling or staying asleep, or sleeping too much 1   4.  Feeling tired or having little energy 1   5.  Poor appetite or overeating 0   6.  Feeling bad about yourself 0   7.  Trouble concentrating 1   8.  Moving slowly or restless 0   Q9: Thoughts of better off dead/self-harm past 2 weeks 0   PHQ-9 Total Score 5   Difficulty at work, home, or with people -     RAUL-7  8/5/2021   1. Feeling nervous, anxious, or on edge 1   2. Not being able to stop or control worrying 1   3. Worrying too much about different things 1   4. Trouble relaxing 1   5. Being so restless that it is hard to sit still 0   6. Becoming easily annoyed or irritable 0   7. Feeling afraid, as if something awful might happen 1   RAUL-7 Total Score 5   If you checked any problems, how difficult have they made it for you to do your work, take care of things at home, or get along with other people? -       Suicide Assessment Five-step Evaluation and Treatment (SAFE-T)      Hyperlipidemia Follow-Up      Are you regularly taking any medication or supplement to lower your cholesterol?   Yes- Lipitor     Are you having muscle aches or other side effects that you think could be caused by your cholesterol lowering medication?  No    Hypertension Follow-up      Do you check your blood pressure regularly outside of the clinic? No     Are you following a low salt diet? Yes    Are your blood pressures ever more than 140 on the top number (systolic) OR more   than 90 on the bottom number (diastolic), for example 140/90? No      How many servings of fruits and vegetables do you eat daily?  2-3    On average, how many sweetened beverages do you drink each day (Examples: soda, juice, sweet tea, etc.  Do NOT count diet or artificially sweetened beverages)?   0    How many days per week  "do you exercise enough to make your heart beat faster? 3 or less    How many minutes a day do you exercise enough to make your heart beat faster? 20 - 29    How many days per week do you miss taking your medication? 0    Patient is also due for pap smear    Review of Systems   Constitutional, HEENT, cardiovascular, pulmonary, GI, , musculoskeletal, neuro, skin, endocrine and psych systems are negative, except as otherwise noted.      Objective    Pulse 92   Temp 97.5  F (36.4  C) (Skin)   Resp 17   Ht 1.676 m (5' 6\")   Wt 92.1 kg (203 lb)   SpO2 98%   BMI 32.77 kg/m    Body mass index is 32.77 kg/m .  Physical Exam   GENERAL: healthy, alert and no distress  EYES: Eyes grossly normal to inspection, PERRL and conjunctivae and sclerae normal  HENT: ear canals and TM's normal, nose and mouth without ulcers or lesions  NECK: no adenopathy, no asymmetry, masses, or scars and thyroid normal to palpation  RESP: lungs clear to auscultation - no rales, rhonchi or wheezes  CV: regular rate and rhythm, normal S1 S2, no S3 or S4, no murmur, click or rub, no peripheral edema and peripheral pulses strong  ABDOMEN: soft, nontender, no hepatosplenomegaly, no masses and bowel sounds normal   (female): normal female external genitalia, normal urethral meatus, vaginal mucosa pink, moist, well rugated, and normal cervix/adnexa/uterus without masses or discharge; pap smear done today  MS: no gross musculoskeletal defects noted, no edema  SKIN: no suspicious lesions or rashes  NEURO: Normal strength and tone, mentation intact and speech normal  PSYCH: mentation appears normal, affect normal/bright            "

## 2021-08-05 ENCOUNTER — OFFICE VISIT (OUTPATIENT)
Dept: FAMILY MEDICINE | Facility: CLINIC | Age: 56
End: 2021-08-05
Payer: COMMERCIAL

## 2021-08-05 VITALS
BODY MASS INDEX: 32.62 KG/M2 | OXYGEN SATURATION: 98 % | WEIGHT: 203 LBS | HEIGHT: 66 IN | RESPIRATION RATE: 17 BRPM | HEART RATE: 92 BPM | TEMPERATURE: 97.5 F

## 2021-08-05 DIAGNOSIS — F33.0 MILD RECURRENT MAJOR DEPRESSION (H): ICD-10-CM

## 2021-08-05 DIAGNOSIS — G47.00 INSOMNIA, UNSPECIFIED TYPE: ICD-10-CM

## 2021-08-05 DIAGNOSIS — Z12.4 ENCOUNTER FOR SCREENING FOR CERVICAL CANCER: Primary | ICD-10-CM

## 2021-08-05 DIAGNOSIS — Z11.59 ENCOUNTER FOR HEPATITIS C SCREENING TEST FOR LOW RISK PATIENT: ICD-10-CM

## 2021-08-05 DIAGNOSIS — I63.9 CEREBROVASCULAR ACCIDENT (CVA), UNSPECIFIED MECHANISM (H): ICD-10-CM

## 2021-08-05 DIAGNOSIS — Z13.29 SCREENING FOR THYROID DISORDER: ICD-10-CM

## 2021-08-05 DIAGNOSIS — Z12.31 ENCOUNTER FOR SCREENING MAMMOGRAM FOR BREAST CANCER: ICD-10-CM

## 2021-08-05 DIAGNOSIS — J30.81 ALLERGY TO DOG DANDER: ICD-10-CM

## 2021-08-05 DIAGNOSIS — I10 HYPERTENSION GOAL BP (BLOOD PRESSURE) < 140/90: ICD-10-CM

## 2021-08-05 DIAGNOSIS — Z13.0 SCREENING, ANEMIA, DEFICIENCY, IRON: ICD-10-CM

## 2021-08-05 LAB
BASOPHILS # BLD AUTO: 0 10E3/UL (ref 0–0.2)
BASOPHILS NFR BLD AUTO: 0 %
EOSINOPHIL # BLD AUTO: 0.1 10E3/UL (ref 0–0.7)
EOSINOPHIL NFR BLD AUTO: 2 %
ERYTHROCYTE [DISTWIDTH] IN BLOOD BY AUTOMATED COUNT: 13.5 % (ref 10–15)
HCT VFR BLD AUTO: 41.4 % (ref 35–47)
HGB BLD-MCNC: 14 G/DL (ref 11.7–15.7)
LYMPHOCYTES # BLD AUTO: 1.6 10E3/UL (ref 0.8–5.3)
LYMPHOCYTES NFR BLD AUTO: 20 %
MCH RBC QN AUTO: 28.4 PG (ref 26.5–33)
MCHC RBC AUTO-ENTMCNC: 33.8 G/DL (ref 31.5–36.5)
MCV RBC AUTO: 84 FL (ref 78–100)
MONOCYTES # BLD AUTO: 0.4 10E3/UL (ref 0–1.3)
MONOCYTES NFR BLD AUTO: 5 %
NEUTROPHILS # BLD AUTO: 5.7 10E3/UL (ref 1.6–8.3)
NEUTROPHILS NFR BLD AUTO: 73 %
PLATELET # BLD AUTO: 211 10E3/UL (ref 150–450)
RBC # BLD AUTO: 4.93 10E6/UL (ref 3.8–5.2)
WBC # BLD AUTO: 7.8 10E3/UL (ref 4–11)

## 2021-08-05 PROCEDURE — 96127 BRIEF EMOTIONAL/BEHAV ASSMT: CPT | Performed by: PHYSICIAN ASSISTANT

## 2021-08-05 PROCEDURE — G0123 SCREEN CERV/VAG THIN LAYER: HCPCS | Performed by: PHYSICIAN ASSISTANT

## 2021-08-05 PROCEDURE — 80050 GENERAL HEALTH PANEL: CPT | Performed by: PHYSICIAN ASSISTANT

## 2021-08-05 PROCEDURE — 87624 HPV HI-RISK TYP POOLED RSLT: CPT | Performed by: PHYSICIAN ASSISTANT

## 2021-08-05 PROCEDURE — 83036 HEMOGLOBIN GLYCOSYLATED A1C: CPT | Performed by: PHYSICIAN ASSISTANT

## 2021-08-05 PROCEDURE — 36415 COLL VENOUS BLD VENIPUNCTURE: CPT | Performed by: PHYSICIAN ASSISTANT

## 2021-08-05 PROCEDURE — 99214 OFFICE O/P EST MOD 30 MIN: CPT | Performed by: PHYSICIAN ASSISTANT

## 2021-08-05 PROCEDURE — 86803 HEPATITIS C AB TEST: CPT | Performed by: PHYSICIAN ASSISTANT

## 2021-08-05 RX ORDER — SERTRALINE HYDROCHLORIDE 100 MG/1
TABLET, FILM COATED ORAL
Qty: 180 TABLET | Refills: 1 | Status: SHIPPED | OUTPATIENT
Start: 2021-08-05 | End: 2022-01-07

## 2021-08-05 RX ORDER — TRAZODONE HYDROCHLORIDE 50 MG/1
TABLET, FILM COATED ORAL
Qty: 60 TABLET | Refills: 3 | Status: SHIPPED | OUTPATIENT
Start: 2021-08-05 | End: 2021-12-03

## 2021-08-05 RX ORDER — BUPROPION HYDROCHLORIDE 150 MG/1
150 TABLET ORAL EVERY MORNING
Qty: 90 TABLET | Refills: 1 | Status: SHIPPED | OUTPATIENT
Start: 2021-08-05 | End: 2022-01-07

## 2021-08-05 RX ORDER — MONTELUKAST SODIUM 10 MG/1
10 TABLET ORAL AT BEDTIME
Qty: 90 TABLET | Refills: 3 | Status: SHIPPED | OUTPATIENT
Start: 2021-08-05 | End: 2022-08-01

## 2021-08-05 RX ORDER — HYDROXYZINE PAMOATE 50 MG/1
50 CAPSULE ORAL AT BEDTIME
Qty: 90 CAPSULE | Refills: 1 | Status: SHIPPED | OUTPATIENT
Start: 2021-08-05 | End: 2022-09-27

## 2021-08-05 RX ORDER — HYDROCHLOROTHIAZIDE 25 MG/1
TABLET ORAL
Qty: 90 TABLET | Refills: 3 | Status: SHIPPED | OUTPATIENT
Start: 2021-08-05 | End: 2021-09-16

## 2021-08-05 RX ORDER — ATORVASTATIN CALCIUM 80 MG/1
80 TABLET, FILM COATED ORAL DAILY
Qty: 90 TABLET | Refills: 3 | Status: SHIPPED | OUTPATIENT
Start: 2021-08-05 | End: 2022-01-07

## 2021-08-05 RX ORDER — LOSARTAN POTASSIUM 100 MG/1
100 TABLET ORAL DAILY
Qty: 90 TABLET | Refills: 3 | Status: SHIPPED | OUTPATIENT
Start: 2021-08-05 | End: 2022-01-07

## 2021-08-05 RX ORDER — LOSARTAN POTASSIUM AND HYDROCHLOROTHIAZIDE 25; 100 MG/1; MG/1
1 TABLET ORAL DAILY
Qty: 90 TABLET | Refills: 3 | Status: SHIPPED | OUTPATIENT
Start: 2021-08-05 | End: 2022-01-07

## 2021-08-05 ASSESSMENT — ANXIETY QUESTIONNAIRES
GAD7 TOTAL SCORE: 5
4. TROUBLE RELAXING: SEVERAL DAYS
7. FEELING AFRAID AS IF SOMETHING AWFUL MIGHT HAPPEN: SEVERAL DAYS
GAD7 TOTAL SCORE: 5
6. BECOMING EASILY ANNOYED OR IRRITABLE: NOT AT ALL
3. WORRYING TOO MUCH ABOUT DIFFERENT THINGS: SEVERAL DAYS
2. NOT BEING ABLE TO STOP OR CONTROL WORRYING: SEVERAL DAYS
5. BEING SO RESTLESS THAT IT IS HARD TO SIT STILL: NOT AT ALL
1. FEELING NERVOUS, ANXIOUS, OR ON EDGE: SEVERAL DAYS
7. FEELING AFRAID AS IF SOMETHING AWFUL MIGHT HAPPEN: SEVERAL DAYS
8. IF YOU CHECKED OFF ANY PROBLEMS, HOW DIFFICULT HAVE THESE MADE IT FOR YOU TO DO YOUR WORK, TAKE CARE OF THINGS AT HOME, OR GET ALONG WITH OTHER PEOPLE?: SOMEWHAT DIFFICULT
GAD7 TOTAL SCORE: 5

## 2021-08-05 ASSESSMENT — PATIENT HEALTH QUESTIONNAIRE - PHQ9
SUM OF ALL RESPONSES TO PHQ QUESTIONS 1-9: 5
10. IF YOU CHECKED OFF ANY PROBLEMS, HOW DIFFICULT HAVE THESE PROBLEMS MADE IT FOR YOU TO DO YOUR WORK, TAKE CARE OF THINGS AT HOME, OR GET ALONG WITH OTHER PEOPLE: SOMEWHAT DIFFICULT
SUM OF ALL RESPONSES TO PHQ QUESTIONS 1-9: 5

## 2021-08-05 ASSESSMENT — MIFFLIN-ST. JEOR: SCORE: 1532.55

## 2021-08-06 LAB
ALBUMIN SERPL-MCNC: 4.1 G/DL (ref 3.4–5)
ALP SERPL-CCNC: 88 U/L (ref 40–150)
ALT SERPL W P-5'-P-CCNC: 32 U/L (ref 0–50)
ANION GAP SERPL CALCULATED.3IONS-SCNC: 7 MMOL/L (ref 3–14)
AST SERPL W P-5'-P-CCNC: 18 U/L (ref 0–45)
BILIRUB SERPL-MCNC: 0.5 MG/DL (ref 0.2–1.3)
BUN SERPL-MCNC: 13 MG/DL (ref 7–30)
CALCIUM SERPL-MCNC: 9.5 MG/DL (ref 8.5–10.1)
CHLORIDE BLD-SCNC: 106 MMOL/L (ref 94–109)
CO2 SERPL-SCNC: 26 MMOL/L (ref 20–32)
CREAT SERPL-MCNC: 0.7 MG/DL (ref 0.52–1.04)
GFR SERPL CREATININE-BSD FRML MDRD: >90 ML/MIN/1.73M2
GLUCOSE BLD-MCNC: 143 MG/DL (ref 70–99)
HBA1C MFR BLD: 5.2 % (ref 0–5.6)
HCV AB SERPL QL IA: NONREACTIVE
POTASSIUM BLD-SCNC: 2.9 MMOL/L (ref 3.4–5.3)
PROT SERPL-MCNC: 7.5 G/DL (ref 6.8–8.8)
SODIUM SERPL-SCNC: 139 MMOL/L (ref 133–144)
TSH SERPL DL<=0.005 MIU/L-ACNC: 1.69 MU/L (ref 0.4–4)

## 2021-08-06 RX ORDER — POTASSIUM CHLORIDE 1500 MG/1
20 TABLET, EXTENDED RELEASE ORAL DAILY
Qty: 90 TABLET | Refills: 3 | Status: SHIPPED | OUTPATIENT
Start: 2021-08-06 | End: 2022-08-01

## 2021-08-06 ASSESSMENT — ANXIETY QUESTIONNAIRES: GAD7 TOTAL SCORE: 5

## 2021-08-06 NOTE — RESULT ENCOUNTER NOTE
"Sol Feliz  Your attached labs are overall within normal limits and stable, but your potassium is low - this is most likely from your hematocrit (water pill).  I will send an additional prescription for potassium to your pharmacy, but I would also encourage you to eat some potassium rich foods in the mean time - bananas, tomatoes, sweet potatoes, orange juice, beet green, white beans, dates/raisins, coconut water and yogurts are good sources of potassium.     Please contact the office with any questions or concerns.    Lorrie Ball \"Salvatore\" CHEYANNE Macias    "

## 2021-08-09 LAB
BKR LAB AP GYN ADEQUACY: NORMAL
BKR LAB AP GYN INTERPRETATION: NORMAL
BKR LAB AP HPV REFLEX: NORMAL
BKR LAB AP PREVIOUS ABNORMAL: NORMAL
PATH REPORT.COMMENTS IMP SPEC: NORMAL

## 2021-08-11 LAB
HUMAN PAPILLOMA VIRUS 16 DNA: NEGATIVE
HUMAN PAPILLOMA VIRUS 18 DNA: NEGATIVE
HUMAN PAPILLOMA VIRUS FINAL DIAGNOSIS: NORMAL
HUMAN PAPILLOMA VIRUS OTHER HR: NEGATIVE

## 2021-09-13 DIAGNOSIS — I10 HYPERTENSION GOAL BP (BLOOD PRESSURE) < 140/90: ICD-10-CM

## 2021-09-15 NOTE — TELEPHONE ENCOUNTER
Routing refill request to provider for review/approval because:  Labs out of range:  8/5/21 potassium was 2.9.  Sending to PCP.  SHERIE Enamorado

## 2021-09-16 RX ORDER — HYDROCHLOROTHIAZIDE 25 MG/1
TABLET ORAL
Qty: 90 TABLET | Refills: 3 | Status: SHIPPED | OUTPATIENT
Start: 2021-09-16 | End: 2022-01-07

## 2021-09-25 ENCOUNTER — HEALTH MAINTENANCE LETTER (OUTPATIENT)
Age: 56
End: 2021-09-25

## 2021-11-20 ENCOUNTER — HEALTH MAINTENANCE LETTER (OUTPATIENT)
Age: 56
End: 2021-11-20

## 2022-01-05 NOTE — PROGRESS NOTES
"Trini is a 56 year old who is being evaluated via a billable video visit.      How would you like to obtain your AVS? MyChart  If the video visit is dropped, the invitation should be resent by: Text to cell phone: see smith  Will anyone else be joining your video visit? No      Video Start Time: 4:00 PM    Assessment & Plan     Mild recurrent major depression (H)  Long standing, chronic, controlled-  Regular refills sent to pharmacy today, feeling well overall, despite work/covid stressors.  - sertraline (ZOLOFT) 100 MG tablet; TAKE 2 TABLETS(200 MG) BY MOUTH DAILY  - buPROPion (WELLBUTRIN XL) 150 MG 24 hr tablet; Take 1 tablet (150 mg) by mouth every morning    Hypertension goal BP (blood pressure) < 140/90  Long standing, chronic, controlled, but may not have been taking medicine correctly - new prescription sent with corrections and discussed with patient at length. Follow up for BP check at [place] pharmacy at least as soon as possible. Lab only appointment to be done in next few weeks as well to check status once back on medicines daily.  - Lipid panel reflex to direct LDL Fasting; Future  - losartan-hydrochlorothiazide (HYZAAR) 100-25 MG tablet; Take 1 tablet by mouth daily    Insomnia, unspecified type  Long standing, chronic, controlled-  Regular refills sent to pharmacy today  - traZODone (DESYREL) 50 MG tablet; Take 2 tablets (100 mg) by mouth At Bedtime    Cerebrovascular accident (CVA), unspecified mechanism (H)  - atorvastatin (LIPITOR) 80 MG tablet; Take 1 tablet (80 mg) by mouth daily    Review of prior external note(s) from - previous routine notes  20 minutes spent on the date of the encounter doing chart review, history and exam, documentation and further activities per the note       BMI:   Estimated body mass index is 32.77 kg/m  as calculated from the following:    Height as of 8/5/21: 1.676 m (5' 6\").    Weight as of 8/5/21: 92.1 kg (203 lb).   Weight management plan: Discussed healthy diet and " exercise guidelines    Patient Instructions   Please make a lab only appointment prior to returning to clinic for fasting bloodwork (nothing to eat or drink x 6-8 hours) - labs ordered today.      Did you know?      You can schedule a video visit for follow-up appointments as well as future appointments for certain conditions.  Please see the below link.     [email or web address]    If you have not already done so,  I encourage you to sign up for [email or web address] ([email or web address]).  This will allow you to review your results, securely communicate with a provider, and schedule virtual visits as   well.      Return in about 3 months (around 4/7/2022) for Follow up, lab only appointment, or sooner with worsening symptoms.    Lorrie Macias PA-C  [facility]    Denton Feliz is a 56 year old who presents for the following health issues     History of Present Illness       Mental Health Follow-up:  Patient presents to follow-up on Depression & Anxiety.Patient's depression since last visit has been:  Better  The patient is not having other symptoms associated with depression.  Patient's anxiety since last visit has been:  Medium  The patient is not having other symptoms associated with anxiety.  Any significant life events: No  Patient is feeling anxious or having panic attacks.  Patient has no concerns about alcohol or drug use.     Social History  Tobacco Use    Smoking status: Never Smoker    Smokeless tobacco: Never Used  Alcohol use: Yes    Alcohol/week: 0.0 standard drinks    Comment: occ  Drug use: No      Today's PHQ-9         PHQ-9 Total Score:     (P) 6   PHQ-9 Q9 Thoughts of better off dead/self-harm past 2 weeks :   (P) Not at all   Thoughts of suicide or self harm:      Self-harm Plan:        Self-harm Action:          Safety concerns for self or others:            Hypertension Follow-up      Do you check your blood pressure regularly  outside of the clinic? No     Are you following a low salt diet? Yes    Are your blood pressures ever more than 140 on the top number (systolic) OR more   than 90 on the bottom number (diastolic), for example 140/90? No   Patient taking losartan 100mg with hydrochlorothiazide 25 mg - with potassium supplement - recently combined them for easier access but may not have been taking them correctly.    The ASCVD Risk score (Lakeshagabrielle PAULSON Jr., et al., 2013) failed to calculate for the following reasons:    The patient has a prior MI or stroke diagnosis     Depression and Anxiety Follow-Up    How are you doing with your depression since your last visit? No change    How are you doing with your anxiety since your last visit?  No change    Are you having other symptoms that might be associated with depression or anxiety? No    Have you had a significant life event? Job Concerns     Do you have any concerns with your use of alcohol or other drugs? No     Patient taking zoloft 200 mg with wellbutrin  mg daily with as needed hydroxyzine and nightly trazodone 100 mg.    Social History     Tobacco Use     Smoking status: Never Smoker     Smokeless tobacco: Never Used   Substance Use Topics     Alcohol use: Yes     Alcohol/week: 0.0 standard drinks     Comment: occ     Drug use: No     PHQ 7/27/2020 8/5/2021 1/7/2022   PHQ-9 Total Score 5 5 6   Q9: Thoughts of better off dead/self-harm past 2 weeks Not at all Not at all Not at all     RAUL-7 SCORE 2/14/2020 8/5/2021 1/7/2022   Total Score 3 (minimal anxiety) 5 (mild anxiety) 2 (minimal anxiety)   Total Score 3 5 2     Last PHQ-9 1/7/2022   1.  Little interest or pleasure in doing things 1   2.  Feeling down, depressed, or hopeless 1   3.  Trouble falling or staying asleep, or sleeping too much 1   4.  Feeling tired or having little energy 1   5.  Poor appetite or overeating 1   6.  Feeling bad about yourself 0   7.  Trouble concentrating 1   8.  Moving slowly or restless 0   Q9:  Thoughts of better off dead/self-harm past 2 weeks 0   PHQ-9 Total Score 6   Difficulty at work, home, or with people -     RAUL-7  1/7/2022   1. Feeling nervous, anxious, or on edge 1   2. Not being able to stop or control worrying 1   3. Worrying too much about different things 0   4. Trouble relaxing 0   5. Being so restless that it is hard to sit still 0   6. Becoming easily annoyed or irritable 0   7. Feeling afraid, as if something awful might happen 0   RAUL-7 Total Score 2   If you checked any problems, how difficult have they made it for you to do your work, take care of things at home, or get along with other people? -       Suicide Assessment Five-step Evaluation and Treatment (SAFE-T)      Potassium done 8/5/21 and low at 2.9, due to repeat; ordered previously.    Review of Systems   Constitutional, HEENT, cardiovascular, pulmonary, GI, , musculoskeletal, neuro, skin, endocrine and psych systems are negative, except as otherwise noted.      Objective           Vitals:  No vitals were obtained today due to virtual visit.    Physical Exam   GENERAL: Healthy, alert and no distress  EYES: Eyes grossly normal to inspection.  No discharge or erythema, or obvious scleral/conjunctival abnormalities.  RESP: No audible wheeze, cough, or visible cyanosis.  No visible retractions or increased work of breathing.    SKIN: Visible skin clear. No significant rash, abnormal pigmentation or lesions.  NEURO: Cranial nerves grossly intact.  Mentation and speech appropriate for age.  PSYCH: Mentation appears normal, affect normal/bright, judgement and insight intact, normal speech and appearance well-groomed.            Video-Visit Details    Type of service:  Video Visit    Video End Time:4:14 PM    Originating Location (pt. Location): Home    Distant Location (provider location):  [facility]     Platform used for Video Visit: Audionamix     Answers for HPI/ROS submitted by the patient on 1/7/2022  If you  checked off any problems, how difficult have these problems made it for you to do your work, take care of things at home, or get along with other people?: Not difficult at all  PHQ9 TOTAL SCORE: 6  RAUL 7 TOTAL SCORE: 2

## 2022-01-07 ENCOUNTER — VIRTUAL VISIT (OUTPATIENT)
Dept: FAMILY MEDICINE | Facility: CLINIC | Age: 57
End: 2022-01-07
Payer: COMMERCIAL

## 2022-01-07 DIAGNOSIS — G47.00 INSOMNIA, UNSPECIFIED TYPE: ICD-10-CM

## 2022-01-07 DIAGNOSIS — I10 HYPERTENSION GOAL BP (BLOOD PRESSURE) < 140/90: ICD-10-CM

## 2022-01-07 DIAGNOSIS — F33.0 MILD RECURRENT MAJOR DEPRESSION (H): Primary | ICD-10-CM

## 2022-01-07 DIAGNOSIS — I63.9 CEREBROVASCULAR ACCIDENT (CVA), UNSPECIFIED MECHANISM (H): ICD-10-CM

## 2022-01-07 PROCEDURE — 99214 OFFICE O/P EST MOD 30 MIN: CPT | Mod: GT | Performed by: PHYSICIAN ASSISTANT

## 2022-01-07 PROCEDURE — 96127 BRIEF EMOTIONAL/BEHAV ASSMT: CPT | Mod: 95 | Performed by: PHYSICIAN ASSISTANT

## 2022-01-07 RX ORDER — TRAZODONE HYDROCHLORIDE 50 MG/1
100 TABLET, FILM COATED ORAL AT BEDTIME
Qty: 180 TABLET | Refills: 1 | Status: SHIPPED | OUTPATIENT
Start: 2022-01-07 | End: 2022-08-01

## 2022-01-07 RX ORDER — SERTRALINE HYDROCHLORIDE 100 MG/1
TABLET, FILM COATED ORAL
Qty: 180 TABLET | Refills: 1 | Status: SHIPPED | OUTPATIENT
Start: 2022-01-07 | End: 2022-07-05

## 2022-01-07 RX ORDER — BUPROPION HYDROCHLORIDE 150 MG/1
150 TABLET ORAL EVERY MORNING
Qty: 90 TABLET | Refills: 1 | Status: SHIPPED | OUTPATIENT
Start: 2022-01-07 | End: 2022-07-05

## 2022-01-07 RX ORDER — ATORVASTATIN CALCIUM 80 MG/1
80 TABLET, FILM COATED ORAL DAILY
Qty: 90 TABLET | Refills: 3 | Status: SHIPPED | OUTPATIENT
Start: 2022-01-07 | End: 2023-11-28

## 2022-01-07 RX ORDER — LOSARTAN POTASSIUM AND HYDROCHLOROTHIAZIDE 25; 100 MG/1; MG/1
1 TABLET ORAL DAILY
Qty: 90 TABLET | Refills: 3 | Status: SHIPPED | OUTPATIENT
Start: 2022-01-07 | End: 2023-03-24

## 2022-01-07 ASSESSMENT — ANXIETY QUESTIONNAIRES
GAD7 TOTAL SCORE: 2
1. FEELING NERVOUS, ANXIOUS, OR ON EDGE: SEVERAL DAYS
6. BECOMING EASILY ANNOYED OR IRRITABLE: NOT AT ALL
5. BEING SO RESTLESS THAT IT IS HARD TO SIT STILL: NOT AT ALL
GAD7 TOTAL SCORE: 2
2. NOT BEING ABLE TO STOP OR CONTROL WORRYING: SEVERAL DAYS
7. FEELING AFRAID AS IF SOMETHING AWFUL MIGHT HAPPEN: NOT AT ALL
4. TROUBLE RELAXING: NOT AT ALL
7. FEELING AFRAID AS IF SOMETHING AWFUL MIGHT HAPPEN: NOT AT ALL
3. WORRYING TOO MUCH ABOUT DIFFERENT THINGS: NOT AT ALL
GAD7 TOTAL SCORE: 2

## 2022-01-07 ASSESSMENT — PATIENT HEALTH QUESTIONNAIRE - PHQ9
SUM OF ALL RESPONSES TO PHQ QUESTIONS 1-9: 6
SUM OF ALL RESPONSES TO PHQ QUESTIONS 1-9: 6
10. IF YOU CHECKED OFF ANY PROBLEMS, HOW DIFFICULT HAVE THESE PROBLEMS MADE IT FOR YOU TO DO YOUR WORK, TAKE CARE OF THINGS AT HOME, OR GET ALONG WITH OTHER PEOPLE: NOT DIFFICULT AT ALL

## 2022-01-08 ASSESSMENT — ANXIETY QUESTIONNAIRES: GAD7 TOTAL SCORE: 2

## 2022-01-08 ASSESSMENT — PATIENT HEALTH QUESTIONNAIRE - PHQ9: SUM OF ALL RESPONSES TO PHQ QUESTIONS 1-9: 6

## 2022-03-12 ENCOUNTER — HEALTH MAINTENANCE LETTER (OUTPATIENT)
Age: 57
End: 2022-03-12

## 2022-05-07 ENCOUNTER — HEALTH MAINTENANCE LETTER (OUTPATIENT)
Age: 57
End: 2022-05-07

## 2022-07-01 DIAGNOSIS — F33.0 MILD RECURRENT MAJOR DEPRESSION (H): ICD-10-CM

## 2022-07-05 ENCOUNTER — TELEPHONE (OUTPATIENT)
Dept: FAMILY MEDICINE | Facility: CLINIC | Age: 57
End: 2022-07-05

## 2022-07-05 DIAGNOSIS — F33.0 MILD RECURRENT MAJOR DEPRESSION (H): ICD-10-CM

## 2022-07-05 RX ORDER — BUPROPION HYDROCHLORIDE 150 MG/1
TABLET ORAL
Qty: 90 TABLET | Refills: 1 | Status: SHIPPED | OUTPATIENT
Start: 2022-07-05 | End: 2022-09-27

## 2022-07-05 RX ORDER — SERTRALINE HYDROCHLORIDE 100 MG/1
TABLET, FILM COATED ORAL
Qty: 180 TABLET | Refills: 1 | Status: SHIPPED | OUTPATIENT
Start: 2022-07-05 | End: 2022-09-27

## 2022-07-18 ENCOUNTER — E-VISIT (OUTPATIENT)
Dept: FAMILY MEDICINE | Facility: CLINIC | Age: 57
End: 2022-07-18
Payer: COMMERCIAL

## 2022-07-18 DIAGNOSIS — J30.81 ALLERGY TO DOG DANDER: ICD-10-CM

## 2022-07-18 PROCEDURE — 99421 OL DIG E/M SVC 5-10 MIN: CPT | Performed by: PHYSICIAN ASSISTANT

## 2022-07-18 RX ORDER — ALBUTEROL SULFATE 90 UG/1
AEROSOL, METERED RESPIRATORY (INHALATION)
Qty: 18 G | Refills: 3 | Status: SHIPPED | OUTPATIENT
Start: 2022-07-18

## 2022-07-18 RX ORDER — ALBUTEROL SULFATE 90 UG/1
AEROSOL, METERED RESPIRATORY (INHALATION)
Qty: 18 G | Refills: 3 | Status: SHIPPED | OUTPATIENT
Start: 2022-07-18 | End: 2022-07-18

## 2022-09-24 ASSESSMENT — ENCOUNTER SYMPTOMS
HEADACHES: 0
CONSTIPATION: 0
PALPITATIONS: 0
NERVOUS/ANXIOUS: 0
DIARRHEA: 0
CHILLS: 0
ARTHRALGIAS: 0
PARESTHESIAS: 0
HEMATOCHEZIA: 0
ABDOMINAL PAIN: 0
JOINT SWELLING: 0
FEVER: 0
SHORTNESS OF BREATH: 0
DYSURIA: 0
FREQUENCY: 0
NAUSEA: 0
WEAKNESS: 0
COUGH: 0
MYALGIAS: 0
HEARTBURN: 0
BREAST MASS: 0
EYE PAIN: 0
HEMATURIA: 0
SORE THROAT: 0
DIZZINESS: 0

## 2022-09-27 ENCOUNTER — OFFICE VISIT (OUTPATIENT)
Dept: FAMILY MEDICINE | Facility: CLINIC | Age: 57
End: 2022-09-27
Payer: COMMERCIAL

## 2022-09-27 VITALS
SYSTOLIC BLOOD PRESSURE: 118 MMHG | DIASTOLIC BLOOD PRESSURE: 84 MMHG | BODY MASS INDEX: 32.62 KG/M2 | RESPIRATION RATE: 14 BRPM | WEIGHT: 202.1 LBS | OXYGEN SATURATION: 99 % | TEMPERATURE: 98.2 F | HEART RATE: 83 BPM

## 2022-09-27 DIAGNOSIS — Z13.1 SCREENING FOR DIABETES MELLITUS: ICD-10-CM

## 2022-09-27 DIAGNOSIS — Z13.29 SCREENING FOR THYROID DISORDER: ICD-10-CM

## 2022-09-27 DIAGNOSIS — Z00.00 ROUTINE GENERAL MEDICAL EXAMINATION AT A HEALTH CARE FACILITY: Primary | ICD-10-CM

## 2022-09-27 DIAGNOSIS — G47.00 INSOMNIA, UNSPECIFIED TYPE: ICD-10-CM

## 2022-09-27 DIAGNOSIS — I10 HYPERTENSION GOAL BP (BLOOD PRESSURE) < 140/90: ICD-10-CM

## 2022-09-27 DIAGNOSIS — Z12.31 VISIT FOR SCREENING MAMMOGRAM: ICD-10-CM

## 2022-09-27 DIAGNOSIS — Z13.6 CARDIOVASCULAR SCREENING; LDL GOAL LESS THAN 160: ICD-10-CM

## 2022-09-27 DIAGNOSIS — Z23 HIGH PRIORITY FOR 2019-NCOV VACCINE: ICD-10-CM

## 2022-09-27 DIAGNOSIS — F33.0 MILD RECURRENT MAJOR DEPRESSION (H): ICD-10-CM

## 2022-09-27 LAB
HBA1C MFR BLD: 5.5 % (ref 0–5.6)
HGB BLD-MCNC: 13.8 G/DL (ref 11.7–15.7)

## 2022-09-27 PROCEDURE — 80053 COMPREHEN METABOLIC PANEL: CPT | Performed by: PHYSICIAN ASSISTANT

## 2022-09-27 PROCEDURE — 91312 COVID-19,PF,PFIZER BOOSTER BIVALENT: CPT | Performed by: PHYSICIAN ASSISTANT

## 2022-09-27 PROCEDURE — 85018 HEMOGLOBIN: CPT | Performed by: PHYSICIAN ASSISTANT

## 2022-09-27 PROCEDURE — 0124A COVID-19,PF,PFIZER BOOSTER BIVALENT: CPT | Performed by: PHYSICIAN ASSISTANT

## 2022-09-27 PROCEDURE — 99396 PREV VISIT EST AGE 40-64: CPT | Mod: 25 | Performed by: PHYSICIAN ASSISTANT

## 2022-09-27 PROCEDURE — 83036 HEMOGLOBIN GLYCOSYLATED A1C: CPT | Performed by: PHYSICIAN ASSISTANT

## 2022-09-27 PROCEDURE — 80061 LIPID PANEL: CPT | Performed by: PHYSICIAN ASSISTANT

## 2022-09-27 PROCEDURE — 36415 COLL VENOUS BLD VENIPUNCTURE: CPT | Performed by: PHYSICIAN ASSISTANT

## 2022-09-27 PROCEDURE — 84443 ASSAY THYROID STIM HORMONE: CPT | Performed by: PHYSICIAN ASSISTANT

## 2022-09-27 RX ORDER — BUPROPION HYDROCHLORIDE 150 MG/1
150 TABLET ORAL EVERY MORNING
Qty: 90 TABLET | Refills: 1 | Status: SHIPPED | OUTPATIENT
Start: 2022-09-27 | End: 2023-03-30

## 2022-09-27 RX ORDER — HYDROXYZINE PAMOATE 50 MG/1
50 CAPSULE ORAL AT BEDTIME
Qty: 90 CAPSULE | Refills: 1 | Status: SHIPPED | OUTPATIENT
Start: 2022-09-27 | End: 2023-04-05

## 2022-09-27 RX ORDER — SERTRALINE HYDROCHLORIDE 100 MG/1
TABLET, FILM COATED ORAL
Qty: 180 TABLET | Refills: 1 | Status: SHIPPED | OUTPATIENT
Start: 2022-09-27 | End: 2023-04-05

## 2022-09-27 RX ORDER — TRAZODONE HYDROCHLORIDE 100 MG/1
100 TABLET ORAL AT BEDTIME
Qty: 90 TABLET | Refills: 1 | Status: SHIPPED | OUTPATIENT
Start: 2022-09-27 | End: 2023-04-05

## 2022-09-27 RX ORDER — POTASSIUM CHLORIDE 1500 MG/1
TABLET, EXTENDED RELEASE ORAL
Qty: 90 TABLET | Refills: 1 | Status: SHIPPED | OUTPATIENT
Start: 2022-09-27 | End: 2023-04-05

## 2022-09-27 ASSESSMENT — PATIENT HEALTH QUESTIONNAIRE - PHQ9
SUM OF ALL RESPONSES TO PHQ QUESTIONS 1-9: 1
SUM OF ALL RESPONSES TO PHQ QUESTIONS 1-9: 1
10. IF YOU CHECKED OFF ANY PROBLEMS, HOW DIFFICULT HAVE THESE PROBLEMS MADE IT FOR YOU TO DO YOUR WORK, TAKE CARE OF THINGS AT HOME, OR GET ALONG WITH OTHER PEOPLE: NOT DIFFICULT AT ALL

## 2022-09-27 ASSESSMENT — ENCOUNTER SYMPTOMS
EYE PAIN: 0
COUGH: 0
HEADACHES: 0
NAUSEA: 0
JOINT SWELLING: 0
HEARTBURN: 0
WEAKNESS: 0
SHORTNESS OF BREATH: 0
DYSURIA: 0
DIZZINESS: 0
CHILLS: 0
SORE THROAT: 0
CONSTIPATION: 0
DIARRHEA: 0
MYALGIAS: 0
PALPITATIONS: 0
PARESTHESIAS: 0
BREAST MASS: 0
NERVOUS/ANXIOUS: 0
HEMATOCHEZIA: 0
FREQUENCY: 0
ABDOMINAL PAIN: 0
ARTHRALGIAS: 0
FEVER: 0
HEMATURIA: 0

## 2022-09-27 ASSESSMENT — PAIN SCALES - GENERAL: PAINLEVEL: NO PAIN (0)

## 2022-09-27 NOTE — PROGRESS NOTES
SUBJECTIVE:   CC: Trini is an 57 year old who presents for preventive health visit.     Patient has been advised of split billing requirements and indicates understanding: Yes  Healthy Habits:     Getting at least 3 servings of Calcium per day:  Yes    Bi-annual eye exam:  Yes    Dental care twice a year:  NO    Sleep apnea or symptoms of sleep apnea:  None    Diet:  Regular (no restrictions)    Frequency of exercise:  1 day/week    Duration of exercise:  Less than 15 minutes    Taking medications regularly:  Yes    Medication side effects:  Not applicable    PHQ-2 Total Score: 0    Additional concerns today:  No      Today's PHQ-2 Score:   PHQ-2 ( 1999 [employer]) 9/24/2022   Q1: Little interest or pleasure in doing things 0   Q2: Feeling down, depressed or hopeless 0   PHQ-2 Score 0   PHQ-2 Total Score (12-17 Years)- Positive if 3 or more points; Administer PHQ-A if positive -   Q1: Little interest or pleasure in doing things Not at all   Q2: Feeling down, depressed or hopeless Not at all   PHQ-2 Score 0       Abuse: Current or Past (Physical, Sexual or Emotional) - No  Do you feel safe in your environment? Yes    Have you ever done Advance Care Planning? (For example, a Health Directive, POLST, or a discussion with a medical provider or your loved ones about your wishes): No, advance care planning information given to patient to review.  Patient declined advance care planning discussion at this time.    Social History     Tobacco Use     Smoking status: Never Smoker     Smokeless tobacco: Never Used   Substance Use Topics     Alcohol use: Yes     Comment: occ     If you drink alcohol do you typically have >3 drinks per day or >7 drinks per week? No    No flowsheet data found.    Reviewed orders with patient.  Reviewed health maintenance and updated orders accordingly - Yes  Lab work is in process  Labs reviewed in EPIC  BP Readings from Last 3 Encounters:   09/27/22 118/84   11/30/20 128/88   03/24/20 (!) 137/108     Wt Readings from Last 3 Encounters:   22 91.7 kg (202 lb 1.6 oz)   21 92.1 kg (203 lb)   20 85.3 kg (188 lb)                  Patient Active Problem List   Diagnosis     Major depressive disorder, single episode in full remission (H)     CARDIOVASCULAR SCREENING; LDL GOAL LESS THAN 160     Seasonal allergic rhinitis     Metatarsalgia of right foot     Hypertension goal BP (blood pressure) < 140/90     Other closed extra-articular fracture of distal end of left radius, initial encounter     Stroke (H)     Past Surgical History:   Procedure Laterality Date     AS TMJ ARTHROSCOPY/SURGERY        SECTION       OPEN REDUCTION INTERNAL FIXATION WRIST Left 2016    Procedure: OPEN REDUCTION INTERNAL FIXATION WRIST;  Surgeon: Fritz Reid MD;  Location:  OR       Social History     Tobacco Use     Smoking status: Never Smoker     Smokeless tobacco: Never Used   Substance Use Topics     Alcohol use: Yes     Comment: occ     Family History   Problem Relation Age of Onset     Depression Mother      Hypertension Father      Depression Father      Substance Abuse Father      Cerebrovascular Disease Father 85     Diabetes Maternal Grandfather      Hypertension Brother      Depression Brother      Substance Abuse Brother      Mental Illness Brother          Current Outpatient Medications   Medication Sig Dispense Refill     atorvastatin (LIPITOR) 80 MG tablet Take 1 tablet (80 mg) by mouth daily 90 tablet 3     buPROPion (WELLBUTRIN XL) 150 MG 24 hr tablet Take 1 tablet (150 mg) by mouth every morning 90 tablet 1     fluticasone (FLONASE) 50 MCG/ACT nasal spray Spray 1-2 sprays into both nostrils daily as needed for rhinitis or allergies       hydrOXYzine (VISTARIL) 50 MG capsule Take 1 capsule (50 mg) by mouth At Bedtime 90 capsule 1     losartan-hydrochlorothiazide (HYZAAR) 100-25 MG tablet Take 1 tablet by mouth daily 90 tablet 3     montelukast (SINGULAIR) 10 MG tablet TAKE 1  TABLET(10 MG) BY MOUTH AT BEDTIME 90 tablet 0     potassium chloride ER (K-TAB) 20 MEQ CR tablet TAKE 1 TABLET(20 MEQ) BY MOUTH DAILY 90 tablet 1     sertraline (ZOLOFT) 100 MG tablet TAKE 2 TABLETS(200 MG) BY MOUTH DAILY 180 tablet 1     traZODone (DESYREL) 100 MG tablet Take 1 tablet (100 mg) by mouth At Bedtime 90 tablet 1     aspirin (ASA) 325 MG EC tablet        VENTOLIN  (90 Base) MCG/ACT inhaler INHALE 2 PUFFS INTO THE LUNGS EVERY 4 HOURS AS NEEDED 18 g 3     Allergies   Allergen Reactions     Seasonal Allergies      Recent Labs   Lab Test 09/27/22  1501 08/05/21  1452 11/30/20  1503 04/07/20  1539 03/19/20  1138 03/18/20  1003   A1C 5.5 5.2  --   --   --  5.3   LDL 58  --  88  --   --  208*   HDL 44*  --  47*  --   --  40*   TRIG 174*  --  144  --   --  159*   ALT 29 32  --   --  36  --    CR 0.66 0.70  --   --  0.78  Canceled, Test credited 0.92   GFRESTIMATED >90 >90  --   --  86  Canceled, Test credited 71   GFRESTBLACK  --   --   --   --  >90  Canceled, Test credited 82   POTASSIUM 3.5 2.9*  --    < > 3.8  Canceled, Test credited 3.2*   TSH 2.86 1.69  --   --   --   --     < > = values in this interval not displayed.        Breast Cancer Screening:    Breast CA Risk Assessment (FHS-7) 9/24/2022   Do you have a family history of breast, colon, or ovarian cancer? No / Unknown       Mammogram Screening: Recommended mammography every 1-2 years with patient discussion and risk factor consideration  Pertinent mammograms are reviewed under the imaging tab.    History of abnormal Pap smear: NO - age 30-65 PAP every 5 years with negative HPV co-testing recommended  PAP / HPV Latest Ref Rng & Units 8/5/2021 5/13/2016   PAP   Other-Negative for Intraepithelial Lesion or Malignancy (NILM) -   PAP (Historical) - - NIL   HPV16 Negative Negative Negative   HPV18 Negative Negative Negative   HRHPV Negative Negative Negative     Reviewed and updated as needed this visit by clinical staff   Tobacco  Allergies   Meds  Problems  Med Hx  Surg Hx  Fam Hx  Soc   Hx          Reviewed and updated as needed this visit by Provider   Tobacco  Allergies  Meds  Problems  Med Hx  Surg Hx  Fam Hx             Past Medical History:   Diagnosis Date     Allergic rhinitis      Broken wrist 2/10/2016    left wrist     Depressive disorder      Fracture of fibula 2015     Hypertension      PONV (postoperative nausea and vomiting)      Stroke (H) 3/18/2020     Uncomplicated asthma       Past Surgical History:   Procedure Laterality Date     AS TMJ ARTHROSCOPY/SURGERY        SECTION       OPEN REDUCTION INTERNAL FIXATION WRIST Left 2016    Procedure: OPEN REDUCTION INTERNAL FIXATION WRIST;  Surgeon: Fritz Reid MD;  Location:  OR       Review of Systems   Constitutional: Negative for chills and fever.   HENT: Negative for congestion, ear pain, hearing loss and sore throat.    Eyes: Negative for pain and visual disturbance.   Respiratory: Negative for cough and shortness of breath.    Cardiovascular: Negative for chest pain, palpitations and peripheral edema.   Gastrointestinal: Negative for abdominal pain, constipation, diarrhea, heartburn, hematochezia and nausea.   Breasts:  Negative for tenderness, breast mass and discharge.   Genitourinary: Positive for urgency. Negative for dysuria, frequency, genital sores, hematuria, pelvic pain, vaginal bleeding and vaginal discharge.   Musculoskeletal: Negative for arthralgias, joint swelling and myalgias.   Skin: Negative for rash.   Neurological: Negative for dizziness, weakness, headaches and paresthesias.   Psychiatric/Behavioral: Negative for mood changes. The patient is not nervous/anxious.         OBJECTIVE:   /84   Pulse 83   Temp 98.2  F (36.8  C) (Temporal)   Resp 14   Wt 91.7 kg (202 lb 1.6 oz)   SpO2 99%   BMI 32.62 kg/m    Physical Exam  GENERAL: healthy, alert and no distress  EYES: Eyes grossly normal to inspection, PERRL and  conjunctivae and sclerae normal  HENT: ear canals and TM's normal, nose and mouth without ulcers or lesions  NECK: no adenopathy, no asymmetry, masses, or scars and thyroid normal to palpation  RESP: lungs clear to auscultation - no rales, rhonchi or wheezes  CV: regular rate and rhythm, normal S1 S2, no S3 or S4, no murmur, click or rub, no peripheral edema and peripheral pulses strong  ABDOMEN: soft, nontender, no hepatosplenomegaly, no masses and bowel sounds normal  MS: no gross musculoskeletal defects noted, no edema  SKIN: no suspicious lesions or rashes  NEURO: Normal strength and tone, mentation intact and speech normal  PSYCH: mentation appears normal, affect normal/bright    Diagnostic Test Results:  Labs reviewed in Epic    ASSESSMENT/PLAN:       ICD-10-CM    1. Routine general medical examination at a health care facility  Z00.00    2. Hypertension goal BP (blood pressure) < 140/90  I10 Comprehensive metabolic panel     Hemoglobin     potassium chloride ER (K-TAB) 20 MEQ CR tablet     Comprehensive metabolic panel     Hemoglobin   3. Mild recurrent major depression (H)  F33.0 sertraline (ZOLOFT) 100 MG tablet     buPROPion (WELLBUTRIN XL) 150 MG 24 hr tablet     hydrOXYzine (VISTARIL) 50 MG capsule   4. Insomnia, unspecified type  G47.00 traZODone (DESYREL) 100 MG tablet   5. Visit for screening mammogram  Z12.31 MA SCREENING DIGITAL BILAT - Future  (s+30)   6. CARDIOVASCULAR SCREENING; LDL GOAL LESS THAN 160  Z13.6 Lipid panel reflex to direct LDL Fasting     Lipid panel reflex to direct LDL Fasting   7. Screening for thyroid disorder  Z13.29 TSH with free T4 reflex     TSH with free T4 reflex   8. Screening for diabetes mellitus  Z13.1 HEMOGLOBIN A1C     HEMOGLOBIN A1C   9. High priority for 2019-nCoV vaccine  Z23 COVID-19,PF,PFIZER BOOSTER BIVALENT 12+Yrs       Patient has been advised of split billing requirements and indicates understanding: Yes    COUNSELING:  Reviewed preventive health counseling,  "as reflected in patient instructions       Regular exercise       Healthy diet/nutrition       Vision screening       Hearing screening    Estimated body mass index is 32.62 kg/m  as calculated from the following:    Height as of 8/5/21: 1.676 m (5' 6\").    Weight as of this encounter: 91.7 kg (202 lb 1.6 oz).    Weight management plan: Discussed healthy diet and exercise guidelines    She reports that she has never smoked. She has never used smokeless tobacco.      Counseling Resources:  ATP IV Guidelines  Pooled Cohorts Equation Calculator  Breast Cancer Risk Calculator  BRCA-Related Cancer Risk Assessment: FHS-7 Tool  FRAX Risk Assessment  ICSI Preventive Guidelines  Dietary Guidelines for Americans, 2010  [employer]'s MyPlate  ASA Prophylaxis  Lung CA Screening    Lorrie Macias PA-C  [facility] [place]    Answers for HPI/ROS submitted by the patient on 9/27/2022  If you checked off any problems, how difficult have these problems made it for you to do your work, take care of things at home, or get along with other people?: Not difficult at all  PHQ9 TOTAL SCORE: 1      "

## 2022-09-28 LAB
ALBUMIN SERPL-MCNC: 3.7 G/DL (ref 3.4–5)
ALP SERPL-CCNC: 97 U/L (ref 40–150)
ALT SERPL W P-5'-P-CCNC: 29 U/L (ref 0–50)
ANION GAP SERPL CALCULATED.3IONS-SCNC: 8 MMOL/L (ref 3–14)
AST SERPL W P-5'-P-CCNC: 22 U/L (ref 0–45)
BILIRUB SERPL-MCNC: 0.4 MG/DL (ref 0.2–1.3)
BUN SERPL-MCNC: 12 MG/DL (ref 7–30)
CALCIUM SERPL-MCNC: 9.5 MG/DL (ref 8.5–10.1)
CHLORIDE BLD-SCNC: 102 MMOL/L (ref 94–109)
CHOLEST SERPL-MCNC: 137 MG/DL
CO2 SERPL-SCNC: 27 MMOL/L (ref 20–32)
CREAT SERPL-MCNC: 0.66 MG/DL (ref 0.52–1.04)
FASTING STATUS PATIENT QL REPORTED: YES
GFR SERPL CREATININE-BSD FRML MDRD: >90 ML/MIN/1.73M2
GLUCOSE BLD-MCNC: 83 MG/DL (ref 70–99)
HDLC SERPL-MCNC: 44 MG/DL
LDLC SERPL CALC-MCNC: 58 MG/DL
NONHDLC SERPL-MCNC: 93 MG/DL
POTASSIUM BLD-SCNC: 3.5 MMOL/L (ref 3.4–5.3)
PROT SERPL-MCNC: 7.2 G/DL (ref 6.8–8.8)
SODIUM SERPL-SCNC: 137 MMOL/L (ref 133–144)
TRIGL SERPL-MCNC: 174 MG/DL
TSH SERPL DL<=0.005 MIU/L-ACNC: 2.86 MU/L (ref 0.4–4)

## 2022-09-29 NOTE — RESULT ENCOUNTER NOTE
"Sol Feliz  Your attached labs are normal. Keep up the good work!  Please contact the office with any questions or concerns.    Lorrie Ball \"Salvatore\" CHEYANNE Macias  "

## 2022-11-28 DIAGNOSIS — J30.81 ALLERGY TO DOG DANDER: ICD-10-CM

## 2022-11-29 RX ORDER — MONTELUKAST SODIUM 10 MG/1
TABLET ORAL
Qty: 90 TABLET | Refills: 0 | Status: SHIPPED | OUTPATIENT
Start: 2022-11-29

## 2022-12-05 ENCOUNTER — MYC MEDICAL ADVICE (OUTPATIENT)
Dept: FAMILY MEDICINE | Facility: CLINIC | Age: 57
End: 2022-12-05

## 2022-12-05 DIAGNOSIS — F33.0 MILD RECURRENT MAJOR DEPRESSION (H): ICD-10-CM

## 2022-12-05 RX ORDER — SERTRALINE HYDROCHLORIDE 100 MG/1
100 TABLET, FILM COATED ORAL DAILY
Qty: 30 TABLET | Refills: 0 | Status: SHIPPED | OUTPATIENT
Start: 2022-12-05 | End: 2023-04-05

## 2022-12-05 NOTE — TELEPHONE ENCOUNTER
Patient called to request refill.  Prescription approved per [facility] Refill Protocol.  Patient needs supply replenished as they lost while traveling.  Carmen COLBERT RN

## 2023-02-02 NOTE — PATIENT INSTRUCTIONS
Please make a lab only appointment prior to returning to clinic for fasting bloodwork (nothing to eat or drink x 6-8 hours) - labs ordered today.      Did you know?      You can schedule a video visit for follow-up appointments as well as future appointments for certain conditions.  Please see the below link.     [email or web address]    If you have not already done so,  I encourage you to sign up for [employer] ([email or web address]).  This will allow you to review your results, securely communicate with a provider, and schedule virtual visits as   well.  
This was a shared visit with the KRYSTEN. I reviewed and verified the documentation and independently performed the documented:

## 2023-04-05 ENCOUNTER — VIRTUAL VISIT (OUTPATIENT)
Dept: FAMILY MEDICINE | Facility: CLINIC | Age: 58
End: 2023-04-05
Payer: COMMERCIAL

## 2023-04-05 DIAGNOSIS — G47.00 INSOMNIA, UNSPECIFIED TYPE: ICD-10-CM

## 2023-04-05 DIAGNOSIS — I10 HYPERTENSION GOAL BP (BLOOD PRESSURE) < 140/90: ICD-10-CM

## 2023-04-05 DIAGNOSIS — F33.0 MILD RECURRENT MAJOR DEPRESSION (H): ICD-10-CM

## 2023-04-05 PROCEDURE — 99213 OFFICE O/P EST LOW 20 MIN: CPT | Mod: VID | Performed by: NURSE PRACTITIONER

## 2023-04-05 RX ORDER — BUPROPION HYDROCHLORIDE 150 MG/1
150 TABLET ORAL EVERY MORNING
Qty: 90 TABLET | Refills: 1 | Status: SHIPPED | OUTPATIENT
Start: 2023-04-05 | End: 2023-11-28

## 2023-04-05 RX ORDER — SERTRALINE HYDROCHLORIDE 100 MG/1
TABLET, FILM COATED ORAL
Qty: 180 TABLET | Refills: 1 | Status: SHIPPED | OUTPATIENT
Start: 2023-04-05 | End: 2023-11-20

## 2023-04-05 RX ORDER — LOSARTAN POTASSIUM AND HYDROCHLOROTHIAZIDE 25; 100 MG/1; MG/1
1 TABLET ORAL DAILY
Qty: 90 TABLET | Refills: 1 | Status: SHIPPED | OUTPATIENT
Start: 2023-04-05 | End: 2023-11-28

## 2023-04-05 RX ORDER — HYDROXYZINE PAMOATE 50 MG/1
50 CAPSULE ORAL AT BEDTIME
Qty: 90 CAPSULE | Refills: 1 | Status: SHIPPED | OUTPATIENT
Start: 2023-04-05 | End: 2023-11-28

## 2023-04-05 RX ORDER — TRAZODONE HYDROCHLORIDE 100 MG/1
100 TABLET ORAL AT BEDTIME
Qty: 90 TABLET | Refills: 1 | Status: SHIPPED | OUTPATIENT
Start: 2023-04-05 | End: 2023-09-28

## 2023-04-05 RX ORDER — POTASSIUM CHLORIDE 1500 MG/1
TABLET, EXTENDED RELEASE ORAL
Qty: 90 TABLET | Refills: 1 | Status: SHIPPED | OUTPATIENT
Start: 2023-04-05 | End: 2023-09-28

## 2023-04-05 ASSESSMENT — PATIENT HEALTH QUESTIONNAIRE - PHQ9
10. IF YOU CHECKED OFF ANY PROBLEMS, HOW DIFFICULT HAVE THESE PROBLEMS MADE IT FOR YOU TO DO YOUR WORK, TAKE CARE OF THINGS AT HOME, OR GET ALONG WITH OTHER PEOPLE: NOT DIFFICULT AT ALL
SUM OF ALL RESPONSES TO PHQ QUESTIONS 1-9: 2
SUM OF ALL RESPONSES TO PHQ QUESTIONS 1-9: 2

## 2023-04-05 NOTE — PROGRESS NOTES
Trini is a 58 year old who is being evaluated via a billable video visit.      How would you like to obtain your AVS? MyChart  If the video visit is dropped, the invitation should be resent by: Text to cell phone: [phone]  Will anyone else be joining your video visit? No        Assessment & Plan     Mild recurrent major depression (H)  Stable refill sent  - sertraline (ZOLOFT) 100 MG tablet; TAKE 2 TABLETS(200 MG) BY MOUTH DAILY  - buPROPion (WELLBUTRIN XL) 150 MG 24 hr tablet; Take 1 tablet (150 mg) by mouth every morning  - hydrOXYzine (VISTARIL) 50 MG capsule; Take 1 capsule (50 mg) by mouth At Bedtime    Hypertension goal BP (blood pressure) < 140/90  Stable refill sent.  Advised to check kidney function in the next week or 2  - potassium chloride ER (K-TAB) 20 MEQ CR tablet; TAKE 1 TABLET(20 MEQ) BY MOUTH DAILY  - losartan-hydrochlorothiazide (HYZAAR) 100-25 MG tablet; Take 1 tablet by mouth daily  - Basic metabolic panel  (Ca, Cl, CO2, Creat, Gluc, K, Na, BUN); Future    Insomnia, unspecified type  Stable refill sent  - traZODone (DESYREL) 100 MG tablet; Take 1 tablet (100 mg) by mouth At Bedtime    JHONNY Lilly, NP-C  [facility]   Trini is a 58 year old, presenting for the following health issues:  Insomnia        4/5/2023    12:40 PM   Additional Questions   Roomed by Zaira     History of Present Illness       Reason for visit:  Medication refills    She eats 0-1 servings of fruits and vegetables daily.She consumes 0 sweetened beverage(s) daily.She exercises with enough effort to increase her heart rate 10 to 19 minutes per day.  She exercises with enough effort to increase her heart rate 3 or less days per week.   She is taking medications regularly.    Today's PHQ-9         PHQ-9 Total Score: 2    PHQ-9 Q9 Thoughts of better off dead/self-harm past 2 weeks :   Not at all    How difficult have these problems made it for you to do your work, take care of things at  home, or get along with other people: Not difficult at all           Review of Systems   Constitutional, HEENT, cardiovascular, pulmonary, gi and gu systems are negative, except as otherwise noted.      Objective           Vitals:  No vitals were obtained today due to virtual visit.    Physical Exam   GENERAL: Healthy, alert and no distress  EYES: Eyes grossly normal to inspection.  No discharge or erythema, or obvious scleral/conjunctival abnormalities.  RESP: No audible wheeze, cough, or visible cyanosis.  No visible retractions or increased work of breathing.    SKIN: Visible skin clear. No significant rash, abnormal pigmentation or lesions.  NEURO: Cranial nerves grossly intact.  Mentation and speech appropriate for age.  PSYCH: Mentation appears normal, affect normal/bright, judgement and insight intact, normal speech and appearance well-groomed.    Office Visit on 09/27/2022   Component Date Value Ref Range Status     Hemoglobin A1C 09/27/2022 5.5  0.0 - 5.6 % Final    Normal <5.7%   Prediabetes 5.7-6.4%    Diabetes 6.5% or higher     Note: Adopted from ADA consensus guidelines.     Sodium 09/27/2022 137  133 - 144 mmol/L Final     Potassium 09/27/2022 3.5  3.4 - 5.3 mmol/L Final     Chloride 09/27/2022 102  94 - 109 mmol/L Final     Carbon Dioxide (CO2) 09/27/2022 27  20 - 32 mmol/L Final     Anion Gap 09/27/2022 8  3 - 14 mmol/L Final     Urea Nitrogen 09/27/2022 12  7 - 30 mg/dL Final     Creatinine 09/27/2022 0.66  0.52 - 1.04 mg/dL Final     Calcium 09/27/2022 9.5  8.5 - 10.1 mg/dL Final     Glucose 09/27/2022 83  70 - 99 mg/dL Final     Alkaline Phosphatase 09/27/2022 97  40 - 150 U/L Final     AST 09/27/2022 22  0 - 45 U/L Final     ALT 09/27/2022 29  0 - 50 U/L Final     Protein Total 09/27/2022 7.2  6.8 - 8.8 g/dL Final     Albumin 09/27/2022 3.7  3.4 - 5.0 g/dL Final     Bilirubin Total 09/27/2022 0.4  0.2 - 1.3 mg/dL Final     GFR Estimate 09/27/2022 >90  >60 mL/min/1.73m2 Final    Effective  December 21, 2021 eGFRcr in adults is calculated using the 2021 CKD-EPI creatinine equation which includes age and gender (Beatris et al., NEJM, DOI: 10.1056/QMMQur3650335)     Hemoglobin 09/27/2022 13.8  11.7 - 15.7 g/dL Final     TSH 09/27/2022 2.86  0.40 - 4.00 mU/L Final     Cholesterol 09/27/2022 137  <200 mg/dL Final     Triglycerides 09/27/2022 174 (H)  <150 mg/dL Final     Direct Measure HDL 09/27/2022 44 (L)  >=50 mg/dL Final     LDL Cholesterol Calculated 09/27/2022 58  <=100 mg/dL Final     Non HDL Cholesterol 09/27/2022 93  <130 mg/dL Final     Patient Fasting > 8hrs? 09/27/2022 Yes   Final             Video-Visit Details    Type of service:  Video Visit     Originating Location (pt. Location): Home  Distant Location (provider location):  On-site  Platform used for Video Visit: Anyi

## 2023-04-22 ENCOUNTER — HEALTH MAINTENANCE LETTER (OUTPATIENT)
Age: 58
End: 2023-04-22

## 2023-06-01 DIAGNOSIS — F33.0 MILD RECURRENT MAJOR DEPRESSION (H): ICD-10-CM

## 2023-06-01 RX ORDER — SERTRALINE HYDROCHLORIDE 100 MG/1
TABLET, FILM COATED ORAL
Qty: 1 TABLET | Refills: 0 | OUTPATIENT
Start: 2023-06-01

## 2023-07-24 NOTE — ADDENDUM NOTE
Problem: Bleeding ( Delivery)  Goal: Bleeding is Controlled  Outcome: Met     Problem: Change in Fetal Wellbeing ( Delivery)  Goal: Stable Fetal Wellbeing  Outcome: Met     Problem: Infection ( Delivery)  Goal: Absence of Infection Signs and Symptoms  Outcome: Met     Problem: Respiratory Compromise ( Delivery)  Goal: Effective Oxygenation and Ventilation  Outcome: Met   Goal Outcome Evaluation:                         Addended by: COLETTE PERRY on: 7/18/2022 01:35 PM     Modules accepted: Orders

## 2023-08-28 ENCOUNTER — PATIENT OUTREACH (OUTPATIENT)
Dept: CARE COORDINATION | Facility: CLINIC | Age: 58
End: 2023-08-28
Payer: COMMERCIAL

## 2023-09-28 DIAGNOSIS — I10 HYPERTENSION GOAL BP (BLOOD PRESSURE) < 140/90: ICD-10-CM

## 2023-09-28 DIAGNOSIS — G47.00 INSOMNIA, UNSPECIFIED TYPE: ICD-10-CM

## 2023-09-28 RX ORDER — POTASSIUM CHLORIDE 1500 MG/1
TABLET, EXTENDED RELEASE ORAL
Qty: 90 TABLET | Refills: 1 | Status: SHIPPED | OUTPATIENT
Start: 2023-09-28 | End: 2024-03-27

## 2023-09-28 RX ORDER — TRAZODONE HYDROCHLORIDE 100 MG/1
100 TABLET ORAL AT BEDTIME
Qty: 90 TABLET | Refills: 1 | Status: SHIPPED | OUTPATIENT
Start: 2023-09-28 | End: 2024-03-14

## 2023-11-15 NOTE — PROGRESS NOTES
"  [facility]    Stroke Progress Note    Interval Events  Pt reports left leg has been stronger, has been walking to the bathroom ok but has not worked with PT. L hand still weak. No slurred speech today. No new complaints    Stroke Evaluation summarized:  MRI/Head CT: MRI brain shows acute infarct R thalamus/posterior limb of internal capsule,   Intracranial Vascular Imaging: CTA head unremarkable  Cervical Carotid and Vertebral Artery Vascular Imaging: CTA neck shows atherosclerosis without stenosis origin of R ICA  Echocardiogram: EF 55-60%, bubble study negative, normal LA size  EKG/Telemetry: SR  LDL: 208  A1c: 5.3  Troponin: <0.015   Other testing: Not Applicable    Impression  Ischemic Stroke due to small-vessel occlusion     Plan  - Continue Plavix 75 mg and aspirin 81mg daily together for 21 days; then aspirin 325mg alone  - Lipitor 80mg daily for goal LDL 40-70  - Ok to continue permissive HTN today, then treat gradually to goal of <135/80 long term. Discussed with patient using home monitor and starting with keeping a log BID for now and calling PCP with results.  - PT/OT/ST  - PLC stroke education if time allows  - 14 day ziopatch upon discharge to screen for afib although this is a less likely etiology of her stroke given her risk factors    No further workup needed, ok for discharge if does ok with PT. Neurology f/u in 4-6 weeks and consider phone check in only with PCP in 1 week due to COVID-19.    Valentine Ayala PA-C  Neurology  03/19/2020 11:05 AM  To page me or covering stroke neurology team member, click here: AMCOM  Choose \"On Call\" tab at top, then search dropdown box for \"Neurology Adult\" & press Enter, look for Neuro ICU/Stroke    ______________________________________________________    Medications   Home Meds  Prior to Admission medications    Medication Sig Start Date End Date Taking? Authorizing Provider   fluticasone (FLONASE) 50 MCG/ACT nasal spray Spray 1-2 sprays into " Hpi Title: Evaluation of Skin Lesions How Severe Are Your Spot(S)?: mild both nostrils daily as needed for rhinitis or allergies   Yes Unknown, Entered By History   hydrochlorothiazide (HYDRODIURIL) 12.5 MG tablet Take 12.5 mg by mouth At Bedtime    Yes Unknown, Entered By History   hydrOXYzine (VISTARIL) 50 MG capsule Take 50 mg by mouth At Bedtime   Yes Unknown, Entered By History   losartan (COZAAR) 100 MG tablet Take 100 mg by mouth At Bedtime   Yes Unknown, Entered By History   sertraline (ZOLOFT) 100 MG tablet Take 200 mg by mouth At Bedtime   Yes Unknown, Entered By History   traZODone (DESYREL) 50 MG tablet Take 50 mg by mouth At Bedtime   Yes Unknown, Entered By History   albuterol (PROAIR HFA/PROVENTIL HFA/VENTOLIN HFA) 108 (90 Base) MCG/ACT inhaler INHALE 2 PUFFS INTO THE LUNGS EVERY 4 HOURS AS NEEDED 3/19/19   Lorrie Macias PA-C   fluticasone-salmeterol (WIXELA INHUB) 100-50 MCG/DOSE inhaler Inhale 1 puff into the lungs 2 times daily as needed    Unknown, Entered By History       Scheduled Meds    aspirin  81 mg Oral Daily     atorvastatin  80 mg Oral or NG Tube Daily at 8 pm     clopidogrel  75 mg Oral or NG Tube Daily     hydrochlorothiazide  12.5 mg Oral At Bedtime     losartan  100 mg Oral At Bedtime     senna-docusate  1 tablet Oral BID    Or     senna-docusate  2 tablet Oral BID     sertraline  200 mg Oral At Bedtime     sodium chloride (PF)  3 mL Intracatheter Q8H     traZODone  50 mg Oral At Bedtime       Infusion Meds    0.9% sodium chloride + KCl 20 mEq/L 75 mL/hr at 03/18/20 7659     - MEDICATION INSTRUCTIONS -       - MEDICATION INSTRUCTIONS -         PRN Meds  acetaminophen **OR** acetaminophen **OR** acetaminophen, albuterol, bisacodyl **OR** bisacodyl **OR** bisacodyl, bisacodyl, fluticasone, HYDROmorphone, labetalol, lidocaine 4%, lidocaine (buffered or not buffered), magnesium sulfate, - MEDICATION INSTRUCTIONS -, - MEDICATION INSTRUCTIONS -, naloxone, ondansetron **OR** ondansetron, oxyCODONE IR, polyethylene glycol, potassium chloride, potassium  chloride with lidocaine, potassium chloride, potassium chloride, potassium chloride, prochlorperazine **OR** prochlorperazine **OR** prochlorperazine, sodium chloride (PF), sodium phosphate, sodium phosphate, sodium phosphate       PHYSICAL EXAMINATION  Temp:  [97.6  F (36.4  C)-98.1  F (36.7  C)] 98.1  F (36.7  C)  Pulse:  [70-90] 74  Resp:  [16-20] 18  BP: (134-183)/() 183/110  SpO2:  [94 %-99 %] 94 %     General:  patient lying in bed without any acute distress    HEENT:  normocephalic/atraumatic  Pulmonary:  no respiratory distress    Neurologic  Mental Status:  alert, oriented x 3, follows commands, speech clear and fluent, naming and repetition normal  Cranial Nerves:  visual fields intact (tested by nurse), EOMI with normal smooth pursuit, facial sensation intact and symmetric (tested by nurse), facial movements symmetric, hearing not formally tested but intact to conversation, no dysarthria, shoulder shrug equal bilaterally, tongue protrusion midline  Motor:  no abnormal movements, able to move all limbs antigravity spontaneously with no signs of hemiparesis observed, no pronator drift  Reflexes:  unable to test (telestroke)  Sensory:  light touch sensation intact and symmetric throughout upper and lower extremities (assessed by nurse), no extinction on double simultaneous stimulation (assessed by nurse)  Coordination:  normal finger-to-nose and heel-to-shin bilaterally without dysmetria, PRUNIMA slightly slow on L  Station/Gait:  unable to test due to telestroke      Stroke Scales    NIHSS  Interval     Interval Comments     1a. Level of Consciousness 0-->Alert, keenly responsive   1b. LOC Questions 0-->Answers both questions correctly   1c. LOC Commands 0-->Performs both tasks correctly   2.   Best Gaze 0-->Normal   3.   Visual 0-->No visual loss   4.   Facial Palsy 0-->Normal symmetrical movements   5a. Motor Arm, Left 1-->Drift, limb holds 90 (or 45) degrees, but drifts down before full 10 seconds,  does not hit bed or other support   5b. Motor Arm, Right 0-->No drift, limb holds 90 (or 45) degrees for full 10 secs   6a. Motor Leg, Left 0-->No drift, leg holds 30 degree position for full 5 secs   6b. Motor Leg, right 0-->No drift, leg holds 30 degree position for full 5 secs   7.   Limb Ataxia 0-->Absent   8.   Sensory 0-->Normal, no sensory loss   9.   Best Language 0-->No aphasia, normal   10. Dysarthria 0-->Normal   11. Extinction and Inattention  0-->No abnormality   Total 1 (03/19/20 1020)       Imaging  I personally reviewed all imaging; relevant findings per HPI.     Lab Results Data   CBC  Recent Labs   Lab 03/18/20  1003   WBC 6.8   RBC 5.87*   HGB 15.7   HCT 45.6        Basic Metabolic Panel    Recent Labs   Lab 03/18/20  1003      POTASSIUM 3.2*   CHLORIDE 106   CO2 26   BUN 14   CR 0.92   *   [place] 9.1     Liver Panel  Recent Labs   Lab Test 02/14/20  1527 01/03/19  0941   PROTTOTAL 7.5 7.7   ALBUMIN 3.8 4.0   BILITOTAL 0.4 0.5   ALKPHOS 105 99   AST 15 17   ALT 34 37     INR  Recent Labs   Lab Test 03/18/20  1003   INR 1.08      Lipid Profile  Recent Labs   Lab Test 03/18/20  1003 02/14/20  1527 01/03/19  0941   CHOL 280* 249* 257*   HDL 40* 37* 46*   * 180* 183*   TRIG 159* 158* 139     A1C  Recent Labs   Lab Test 03/18/20  1003   A1C 5.3     Troponin I  Recent Labs   Lab 03/19/20  0013 03/18/20  1659   TROPI <0.015 <0.015           Data Telestroke Service Details  (for non-emergent stroke consult with tele)  Video start time 03/19/20   1015   Video end time 03/19/20   1052   Type of service telemedicine diagnostic assessment of acute neurological changes   Reason telemedicine is appropriate patient requires assessment with a specialist for diagnosis and treatment of neurological symptoms    Telemedicine used to see patient due to COVID-19, desire to minimize staff entering pt rooms.   Mode of transmission secure interactive audio and video communication per Demetra    Originating site (patient location) [facility]    Distant site (provider location) [facility] (provider office)       I have personally spent a total of 35 minutes providing critical care services supervising this patient's stroke code activation.  I personally reviewed all lab values and radiology images.  I evaluated and directed care for the patient's critical condition.

## 2023-11-20 ENCOUNTER — MYC REFILL (OUTPATIENT)
Dept: FAMILY MEDICINE | Facility: CLINIC | Age: 58
End: 2023-11-20
Payer: COMMERCIAL

## 2023-11-20 DIAGNOSIS — F33.0 MILD RECURRENT MAJOR DEPRESSION (H): ICD-10-CM

## 2023-11-20 RX ORDER — SERTRALINE HYDROCHLORIDE 100 MG/1
TABLET, FILM COATED ORAL
Qty: 180 TABLET | Refills: 1 | Status: SHIPPED | OUTPATIENT
Start: 2023-11-20 | End: 2024-05-20

## 2023-11-20 RX ORDER — SERTRALINE HYDROCHLORIDE 100 MG/1
TABLET, FILM COATED ORAL
Qty: 180 TABLET | Refills: 1 | OUTPATIENT
Start: 2023-11-20

## 2023-11-21 ASSESSMENT — ENCOUNTER SYMPTOMS
FEVER: 0
HEADACHES: 0
NERVOUS/ANXIOUS: 1
DIZZINESS: 0
COUGH: 0
EYE PAIN: 0
MYALGIAS: 0
SORE THROAT: 0
PARESTHESIAS: 0
HEARTBURN: 0
DYSURIA: 0
WEAKNESS: 0
CHILLS: 0
DIARRHEA: 0
HEMATOCHEZIA: 0
HEMATURIA: 0
CONSTIPATION: 0
NAUSEA: 0
ARTHRALGIAS: 0
SHORTNESS OF BREATH: 0
PALPITATIONS: 0
BREAST MASS: 0
JOINT SWELLING: 0
ABDOMINAL PAIN: 0
FREQUENCY: 1

## 2023-11-27 DIAGNOSIS — I10 HYPERTENSION GOAL BP (BLOOD PRESSURE) < 140/90: ICD-10-CM

## 2023-11-27 RX ORDER — LOSARTAN POTASSIUM AND HYDROCHLOROTHIAZIDE 25; 100 MG/1; MG/1
1 TABLET ORAL DAILY
Qty: 90 TABLET | Refills: 1 | Status: CANCELLED | OUTPATIENT
Start: 2023-11-27

## 2023-11-28 ENCOUNTER — OFFICE VISIT (OUTPATIENT)
Dept: FAMILY MEDICINE | Facility: CLINIC | Age: 58
End: 2023-11-28
Payer: COMMERCIAL

## 2023-11-28 VITALS
DIASTOLIC BLOOD PRESSURE: 90 MMHG | OXYGEN SATURATION: 96 % | WEIGHT: 207 LBS | SYSTOLIC BLOOD PRESSURE: 120 MMHG | HEIGHT: 66 IN | BODY MASS INDEX: 33.27 KG/M2 | HEART RATE: 88 BPM | RESPIRATION RATE: 16 BRPM | TEMPERATURE: 97.1 F

## 2023-11-28 DIAGNOSIS — I10 HYPERTENSION GOAL BP (BLOOD PRESSURE) < 140/90: ICD-10-CM

## 2023-11-28 DIAGNOSIS — Z12.31 VISIT FOR SCREENING MAMMOGRAM: ICD-10-CM

## 2023-11-28 DIAGNOSIS — Z00.00 ROUTINE GENERAL MEDICAL EXAMINATION AT A HEALTH CARE FACILITY: Primary | ICD-10-CM

## 2023-11-28 DIAGNOSIS — I63.9 CEREBROVASCULAR ACCIDENT (CVA), UNSPECIFIED MECHANISM (H): ICD-10-CM

## 2023-11-28 DIAGNOSIS — F33.0 MILD RECURRENT MAJOR DEPRESSION (H): ICD-10-CM

## 2023-11-28 LAB
BASOPHILS # BLD AUTO: 0 10E3/UL (ref 0–0.2)
BASOPHILS NFR BLD AUTO: 0 %
EOSINOPHIL # BLD AUTO: 0.1 10E3/UL (ref 0–0.7)
EOSINOPHIL NFR BLD AUTO: 1 %
ERYTHROCYTE [DISTWIDTH] IN BLOOD BY AUTOMATED COUNT: 13.1 % (ref 10–15)
HBA1C MFR BLD: 5 % (ref 0–5.6)
HCT VFR BLD AUTO: 48 % (ref 35–47)
HGB BLD-MCNC: 15.9 G/DL (ref 11.7–15.7)
IMM GRANULOCYTES # BLD: 0 10E3/UL
IMM GRANULOCYTES NFR BLD: 0 %
LYMPHOCYTES # BLD AUTO: 1.6 10E3/UL (ref 0.8–5.3)
LYMPHOCYTES NFR BLD AUTO: 22 %
MCH RBC QN AUTO: 27.6 PG (ref 26.5–33)
MCHC RBC AUTO-ENTMCNC: 33.1 G/DL (ref 31.5–36.5)
MCV RBC AUTO: 83 FL (ref 78–100)
MONOCYTES # BLD AUTO: 0.6 10E3/UL (ref 0–1.3)
MONOCYTES NFR BLD AUTO: 8 %
NEUTROPHILS # BLD AUTO: 5.2 10E3/UL (ref 1.6–8.3)
NEUTROPHILS NFR BLD AUTO: 69 %
PLATELET # BLD AUTO: 236 10E3/UL (ref 150–450)
RBC # BLD AUTO: 5.77 10E6/UL (ref 3.8–5.2)
WBC # BLD AUTO: 7.5 10E3/UL (ref 4–11)

## 2023-11-28 PROCEDURE — 82728 ASSAY OF FERRITIN: CPT | Performed by: PHYSICIAN ASSISTANT

## 2023-11-28 PROCEDURE — 83036 HEMOGLOBIN GLYCOSYLATED A1C: CPT | Performed by: PHYSICIAN ASSISTANT

## 2023-11-28 PROCEDURE — 83550 IRON BINDING TEST: CPT | Performed by: PHYSICIAN ASSISTANT

## 2023-11-28 PROCEDURE — 83540 ASSAY OF IRON: CPT | Performed by: PHYSICIAN ASSISTANT

## 2023-11-28 PROCEDURE — 36415 COLL VENOUS BLD VENIPUNCTURE: CPT | Performed by: PHYSICIAN ASSISTANT

## 2023-11-28 PROCEDURE — 87635 SARS-COV-2 COVID-19 AMP PRB: CPT | Performed by: PHYSICIAN ASSISTANT

## 2023-11-28 PROCEDURE — 80053 COMPREHEN METABOLIC PANEL: CPT | Performed by: PHYSICIAN ASSISTANT

## 2023-11-28 PROCEDURE — 84443 ASSAY THYROID STIM HORMONE: CPT | Performed by: PHYSICIAN ASSISTANT

## 2023-11-28 PROCEDURE — 85025 COMPLETE CBC W/AUTO DIFF WBC: CPT | Performed by: PHYSICIAN ASSISTANT

## 2023-11-28 PROCEDURE — 99396 PREV VISIT EST AGE 40-64: CPT | Performed by: PHYSICIAN ASSISTANT

## 2023-11-28 RX ORDER — HYDROXYZINE PAMOATE 50 MG/1
50 CAPSULE ORAL AT BEDTIME
Qty: 90 CAPSULE | Refills: 1 | Status: SHIPPED | OUTPATIENT
Start: 2023-11-28

## 2023-11-28 RX ORDER — ATORVASTATIN CALCIUM 80 MG/1
80 TABLET, FILM COATED ORAL DAILY
Qty: 90 TABLET | Refills: 3 | Status: SHIPPED | OUTPATIENT
Start: 2023-11-28

## 2023-11-28 RX ORDER — FLUTICASONE PROPIONATE 50 MCG
2 SPRAY, SUSPENSION (ML) NASAL DAILY
Qty: 18 ML | Refills: 11 | Status: SHIPPED | OUTPATIENT
Start: 2023-11-28

## 2023-11-28 RX ORDER — LOSARTAN POTASSIUM AND HYDROCHLOROTHIAZIDE 25; 100 MG/1; MG/1
1 TABLET ORAL DAILY
Qty: 90 TABLET | Refills: 1 | Status: SHIPPED | OUTPATIENT
Start: 2023-11-28 | End: 2024-05-30

## 2023-11-28 RX ORDER — BUPROPION HYDROCHLORIDE 150 MG/1
150 TABLET ORAL EVERY MORNING
Qty: 90 TABLET | Refills: 1 | Status: SHIPPED | OUTPATIENT
Start: 2023-11-28

## 2023-11-28 ASSESSMENT — ENCOUNTER SYMPTOMS
BREAST MASS: 0
EYE PAIN: 0
HEMATOCHEZIA: 0
ABDOMINAL PAIN: 0
DIARRHEA: 0
HEADACHES: 0
CONSTIPATION: 0
HEMATURIA: 0
PALPITATIONS: 0
JOINT SWELLING: 0
FEVER: 0
DIZZINESS: 0
MYALGIAS: 0
WEAKNESS: 0
COUGH: 0
SORE THROAT: 0
PARESTHESIAS: 0
SHORTNESS OF BREATH: 0
NERVOUS/ANXIOUS: 1
NAUSEA: 0
FREQUENCY: 1
HEARTBURN: 0
DYSURIA: 0
CHILLS: 0
ARTHRALGIAS: 0

## 2023-11-28 ASSESSMENT — ANXIETY QUESTIONNAIRES
6. BECOMING EASILY ANNOYED OR IRRITABLE: NOT AT ALL
5. BEING SO RESTLESS THAT IT IS HARD TO SIT STILL: NOT AT ALL
7. FEELING AFRAID AS IF SOMETHING AWFUL MIGHT HAPPEN: SEVERAL DAYS
IF YOU CHECKED OFF ANY PROBLEMS ON THIS QUESTIONNAIRE, HOW DIFFICULT HAVE THESE PROBLEMS MADE IT FOR YOU TO DO YOUR WORK, TAKE CARE OF THINGS AT HOME, OR GET ALONG WITH OTHER PEOPLE: NOT DIFFICULT AT ALL
GAD7 TOTAL SCORE: 3
1. FEELING NERVOUS, ANXIOUS, OR ON EDGE: SEVERAL DAYS
GAD7 TOTAL SCORE: 3
4. TROUBLE RELAXING: NOT AT ALL
2. NOT BEING ABLE TO STOP OR CONTROL WORRYING: NOT AT ALL
3. WORRYING TOO MUCH ABOUT DIFFERENT THINGS: SEVERAL DAYS

## 2023-11-28 ASSESSMENT — PATIENT HEALTH QUESTIONNAIRE - PHQ9
10. IF YOU CHECKED OFF ANY PROBLEMS, HOW DIFFICULT HAVE THESE PROBLEMS MADE IT FOR YOU TO DO YOUR WORK, TAKE CARE OF THINGS AT HOME, OR GET ALONG WITH OTHER PEOPLE: NOT DIFFICULT AT ALL
SUM OF ALL RESPONSES TO PHQ QUESTIONS 1-9: 3
SUM OF ALL RESPONSES TO PHQ QUESTIONS 1-9: 3

## 2023-11-28 ASSESSMENT — PAIN SCALES - GENERAL: PAINLEVEL: NO PAIN (0)

## 2023-11-28 NOTE — PROGRESS NOTES
SUBJECTIVE:   Trini is a 58 year old, presenting for the following:  Physical        2023     3:45 PM   Additional Questions   Roomed by SHEIRE Valero       Healthy Habits:     Getting at least 3 servings of Calcium per day:  Yes    Bi-annual eye exam:  Yes    Dental care twice a year:  NO    Sleep apnea or symptoms of sleep apnea:  None    Diet:  Regular (no restrictions)    Frequency of exercise:  None    Taking medications regularly:  Yes    Medication side effects:  Not applicable    Additional concerns today:  No    Social History     Tobacco Use    Smoking status: Never    Smokeless tobacco: Never   Substance Use Topics    Alcohol use: Yes     Comment: occ             2023    12:28 PM   Alcohol Use   Prescreen: >3 drinks/day or >7 drinks/week? No     Reviewed orders with patient.  Reviewed health maintenance and updated orders accordingly - Yes  Lab work is in process  Labs reviewed in EPIC  BP Readings from Last 3 Encounters:   23 (!) 120/90   22 118/84   20 128/88    Wt Readings from Last 3 Encounters:   23 93.9 kg (207 lb)   22 91.7 kg (202 lb 1.6 oz)   21 92.1 kg (203 lb)                  Patient Active Problem List   Diagnosis    Major depressive disorder, single episode in full remission (H24)    CARDIOVASCULAR SCREENING; LDL GOAL LESS THAN 160    Seasonal allergic rhinitis    Metatarsalgia of right foot    Hypertension goal BP (blood pressure) < 140/90    Other closed extra-articular fracture of distal end of left radius, initial encounter    Stroke (H)     Past Surgical History:   Procedure Laterality Date    AS TMJ ARTHROSCOPY/SURGERY       SECTION      OPEN REDUCTION INTERNAL FIXATION WRIST Left 2016    Procedure: OPEN REDUCTION INTERNAL FIXATION WRIST;  Surgeon: Fritz Reid MD;  Location:  OR       Social History     Tobacco Use    Smoking status: Never    Smokeless tobacco: Never   Substance Use Topics    Alcohol use: Yes      Comment: occ     Family History   Problem Relation Age of Onset    Depression Mother     Hypertension Father     Depression Father     Substance Abuse Father     Cerebrovascular Disease Father 85    Diabetes Maternal Grandfather     Hypertension Brother     Depression Brother     Substance Abuse Brother     Mental Illness Brother          Current Outpatient Medications   Medication Sig Dispense Refill    atorvastatin (LIPITOR) 80 MG tablet Take 1 tablet (80 mg) by mouth daily 90 tablet 3    buPROPion (WELLBUTRIN XL) 150 MG 24 hr tablet Take 1 tablet (150 mg) by mouth every morning 90 tablet 1    fluticasone (FLONASE) 50 MCG/ACT nasal spray Spray 2 sprays into both nostrils daily 18 mL 11    fluticasone (FLONASE) 50 MCG/ACT nasal spray Spray 1-2 sprays into both nostrils daily as needed for rhinitis or allergies      hydrOXYzine (VISTARIL) 50 MG capsule Take 1 capsule (50 mg) by mouth at bedtime 90 capsule 1    losartan-hydrochlorothiazide (HYZAAR) 100-25 MG tablet Take 1 tablet by mouth daily 90 tablet 1    montelukast (SINGULAIR) 10 MG tablet TAKE 1 TABLET(10 MG) BY MOUTH AT BEDTIME 90 tablet 0    potassium chloride ER (K-TAB) 20 MEQ CR tablet TAKE 1 TABLET(20 MEQ) BY MOUTH DAILY 90 tablet 1    sertraline (ZOLOFT) 100 MG tablet TAKE 2 TABLETS(200 MG) BY MOUTH DAILY 180 tablet 1    traZODone (DESYREL) 100 MG tablet Take 1 tablet (100 mg) by mouth At Bedtime 90 tablet 1    VENTOLIN  (90 Base) MCG/ACT inhaler INHALE 2 PUFFS INTO THE LUNGS EVERY 4 HOURS AS NEEDED 18 g 3     Allergies   Allergen Reactions    Seasonal Allergies      Recent Labs   Lab Test 09/27/22  1501 08/05/21  1452 11/30/20  1503 04/07/20  1539 03/19/20  1138 03/18/20  1003   A1C 5.5 5.2  --   --   --  5.3   LDL 58  --  88  --   --  208*   HDL 44*  --  47*  --   --  40*   TRIG 174*  --  144  --   --  159*   ALT 29 32  --   --  36  --    CR 0.66 0.70  --   --  0.78  Canceled, Test credited 0.92   GFRESTIMATED >90 >90  --   --  86  Canceled,  Test credited 71   GFRESTBLACK  --   --   --   --  >90  Canceled, Test credited 82   POTASSIUM 3.5 2.9*  --    < > 3.8  Canceled, Test credited 3.2*   TSH 2.86 1.69  --   --   --   --     < > = values in this interval not displayed.        Breast Cancer Screenin/24/2022    10:10 PM   Breast CA Risk Assessment (FHS-7)   Do you have a family history of breast, colon, or ovarian cancer? No / Unknown       Mammogram Screening: Recommended mammography every 1-2 years with patient discussion and risk factor consideration  Pertinent mammograms are reviewed under the imaging tab.    History of abnormal Pap smear: NO - age 30-65 PAP every 5 years with negative HPV co-testing recommended      Latest Ref Rng & Units 2021     2:41 PM 2016     5:32 PM   PAP / HPV   PAP  Other-Negative for Intraepithelial Lesion or Malignancy (NILM)     PAP (Historical)   NIL    HPV 16 DNA Negative Negative  Negative    HPV 18 DNA Negative Negative  Negative    Other HR HPV Negative Negative  Negative      Reviewed and updated as needed this visit by clinical staff   Tobacco  Allergies  Meds  Problems  Med Hx  Surg Hx  Fam Hx          Reviewed and updated as needed this visit by Provider   Tobacco  Allergies  Meds  Problems  Med Hx  Surg Hx  Fam Hx           Past Medical History:   Diagnosis Date    Allergic rhinitis     Broken wrist 2/10/2016    left wrist    Depressive disorder     Fracture of fibula 2015    Hypertension     PONV (postoperative nausea and vomiting)     Stroke (H) 3/18/2020    Uncomplicated asthma       Past Surgical History:   Procedure Laterality Date    AS TMJ ARTHROSCOPY/SURGERY       SECTION      OPEN REDUCTION INTERNAL FIXATION WRIST Left 2016    Procedure: OPEN REDUCTION INTERNAL FIXATION WRIST;  Surgeon: Fritz Reid MD;  Location:  OR       Review of Systems   Constitutional:  Negative for chills and fever.   HENT:  Positive for congestion. Negative for  "ear pain, hearing loss and sore throat.    Eyes:  Negative for pain and visual disturbance.   Respiratory:  Negative for cough and shortness of breath.    Cardiovascular:  Negative for chest pain, palpitations and peripheral edema.   Gastrointestinal:  Negative for abdominal pain, constipation, diarrhea, heartburn, hematochezia and nausea.   Breasts:  Negative for tenderness, breast mass and discharge.   Genitourinary:  Positive for frequency and urgency. Negative for dysuria, genital sores, hematuria, pelvic pain, vaginal bleeding and vaginal discharge.   Musculoskeletal:  Negative for arthralgias, joint swelling and myalgias.   Skin:  Negative for rash.   Neurological:  Negative for dizziness, weakness, headaches and paresthesias.   Psychiatric/Behavioral:  Negative for mood changes. The patient is nervous/anxious.         OBJECTIVE:   BP (!) 120/90 (BP Location: Left arm, Patient Position: Sitting, Cuff Size: Adult Regular)   Pulse 88   Temp 97.1  F (36.2  C) (Temporal)   Resp 16   Ht 1.67 m (5' 5.75\")   Wt 93.9 kg (207 lb)   LMP  (LMP Unknown)   SpO2 96%   BMI 33.67 kg/m    Physical Exam  GENERAL: healthy, alert and no distress  EYES: Eyes grossly normal to inspection, PERRL and conjunctivae and sclerae normal  HENT: ear canals and TM's normal, nose and mouth without ulcers or lesions  NECK: no adenopathy, no asymmetry, masses, or scars and thyroid normal to palpation  RESP: lungs clear to auscultation - no rales, rhonchi or wheezes  BREAST: normal without masses, tenderness or nipple discharge and no palpable axillary masses or adenopathy  CV: regular rate and rhythm, normal S1 S2, no S3 or S4, no murmur, click or rub, no peripheral edema and peripheral pulses strong  ABDOMEN: soft, nontender, no hepatosplenomegaly, no masses and bowel sounds normal  MS: no gross musculoskeletal defects noted, no edema  SKIN: no suspicious lesions or rashes  NEURO: Normal strength and tone, mentation intact and speech " normal  PSYCH: mentation appears normal, affect normal/bright    Diagnostic Test Results:  Labs reviewed in Epic    ASSESSMENT/PLAN:       ICD-10-CM    1. Routine general medical examination at a health care facility  Z00.00 fluticasone (FLONASE) 50 MCG/ACT nasal spray      2. Mild recurrent major depression (H24)  F33.0 buPROPion (WELLBUTRIN XL) 150 MG 24 hr tablet     hydrOXYzine (VISTARIL) 50 MG capsule     Comprehensive metabolic panel     CBC with Platelets & Differential     Iron & Iron Binding Capacity     Ferritin     TSH with free T4 reflex     Symptomatic COVID-19 Virus (Coronavirus) by PCR     Comprehensive metabolic panel     CBC with Platelets & Differential     Iron & Iron Binding Capacity     Ferritin     TSH with free T4 reflex     Symptomatic COVID-19 Virus (Coronavirus) by PCR      3. Hypertension goal BP (blood pressure) < 140/90  I10 losartan-hydrochlorothiazide (HYZAAR) 100-25 MG tablet      4. Cerebrovascular accident (CVA), unspecified mechanism (H)  I63.9 HEMOGLOBIN A1C     atorvastatin (LIPITOR) 80 MG tablet     HEMOGLOBIN A1C      5. Visit for screening mammogram  Z12.31 MA SCREENING DIGITAL BILAT - Future  (s+30)          Patient has been advised of split billing requirements and indicates understanding: Yes      COUNSELING:  Reviewed preventive health counseling, as reflected in patient instructions       Regular exercise       Healthy diet/nutrition       Vision screening       Hearing screening        She reports that she has never smoked. She has never used smokeless tobacco.          Salvatore Macias PA-C  [facility]  Answers submitted by the patient for this visit:  Patient Health Questionnaire (Submitted on 11/28/2023)  If you checked off any problems, how difficult have these problems made it for you to do your work, take care of things at home, or get along with other people?: Not difficult at all  PHQ9 TOTAL SCORE: 3  RAUL-7 (Submitted on 11/28/2023)  RAUL 7  TOTAL SCORE: 3

## 2023-11-29 LAB
ALBUMIN SERPL BCG-MCNC: 4.4 G/DL (ref 3.5–5.2)
ALP SERPL-CCNC: 100 U/L (ref 40–150)
ALT SERPL W P-5'-P-CCNC: 40 U/L (ref 0–50)
ANION GAP SERPL CALCULATED.3IONS-SCNC: 11 MMOL/L (ref 7–15)
AST SERPL W P-5'-P-CCNC: 28 U/L (ref 0–45)
BILIRUB SERPL-MCNC: 0.3 MG/DL
BUN SERPL-MCNC: 14.1 MG/DL (ref 6–20)
CALCIUM SERPL-MCNC: 9.5 MG/DL (ref 8.6–10)
CHLORIDE SERPL-SCNC: 104 MMOL/L (ref 98–107)
CREAT SERPL-MCNC: 0.81 MG/DL (ref 0.51–0.95)
DEPRECATED HCO3 PLAS-SCNC: 26 MMOL/L (ref 22–29)
EGFRCR SERPLBLD CKD-EPI 2021: 84 ML/MIN/1.73M2
FERRITIN SERPL-MCNC: 149 NG/ML (ref 11–328)
GLUCOSE SERPL-MCNC: 108 MG/DL (ref 70–99)
IRON BINDING CAPACITY (ROCHE): 301 UG/DL (ref 240–430)
IRON SATN MFR SERPL: 25 % (ref 15–46)
IRON SERPL-MCNC: 75 UG/DL (ref 37–145)
POTASSIUM SERPL-SCNC: 3.7 MMOL/L (ref 3.4–5.3)
PROT SERPL-MCNC: 7.1 G/DL (ref 6.4–8.3)
SARS-COV-2 RNA RESP QL NAA+PROBE: NEGATIVE
SODIUM SERPL-SCNC: 141 MMOL/L (ref 135–145)
TSH SERPL DL<=0.005 MIU/L-ACNC: 4.07 UIU/ML (ref 0.3–4.2)

## 2023-11-30 NOTE — RESULT ENCOUNTER NOTE
"Sol Feliz  Your attached labs are within normal limits with a negative COVID.  Hope you start to feel better soon.    Please contact the office with any questions or concerns.    Lorrie Ball \"Salvatore\" CHEYANNE Macias    "

## 2023-12-02 ENCOUNTER — HEALTH MAINTENANCE LETTER (OUTPATIENT)
Age: 58
End: 2023-12-02

## 2024-01-02 ENCOUNTER — ANCILLARY PROCEDURE (OUTPATIENT)
Dept: MAMMOGRAPHY | Facility: CLINIC | Age: 59
End: 2024-01-02
Attending: PHYSICIAN ASSISTANT
Payer: COMMERCIAL

## 2024-01-02 DIAGNOSIS — Z12.31 VISIT FOR SCREENING MAMMOGRAM: ICD-10-CM

## 2024-01-02 PROCEDURE — 77063 BREAST TOMOSYNTHESIS BI: CPT | Mod: 26 | Performed by: STUDENT IN AN ORGANIZED HEALTH CARE EDUCATION/TRAINING PROGRAM

## 2024-01-02 PROCEDURE — 77063 BREAST TOMOSYNTHESIS BI: CPT

## 2024-01-02 PROCEDURE — 77067 SCR MAMMO BI INCL CAD: CPT | Mod: 26 | Performed by: STUDENT IN AN ORGANIZED HEALTH CARE EDUCATION/TRAINING PROGRAM

## 2024-03-14 ENCOUNTER — MYC REFILL (OUTPATIENT)
Dept: FAMILY MEDICINE | Facility: CLINIC | Age: 59
End: 2024-03-14
Payer: COMMERCIAL

## 2024-03-14 DIAGNOSIS — G47.00 INSOMNIA, UNSPECIFIED TYPE: ICD-10-CM

## 2024-03-14 RX ORDER — TRAZODONE HYDROCHLORIDE 100 MG/1
100 TABLET ORAL AT BEDTIME
Qty: 90 TABLET | Refills: 1 | Status: SHIPPED | OUTPATIENT
Start: 2024-03-14 | End: 2024-03-22

## 2024-03-22 DIAGNOSIS — G47.00 INSOMNIA, UNSPECIFIED TYPE: ICD-10-CM

## 2024-03-22 RX ORDER — TRAZODONE HYDROCHLORIDE 100 MG/1
100 TABLET ORAL AT BEDTIME
Qty: 90 TABLET | Refills: 1 | OUTPATIENT
Start: 2024-03-22

## 2024-03-22 RX ORDER — TRAZODONE HYDROCHLORIDE 100 MG/1
100 TABLET ORAL AT BEDTIME
Qty: 90 TABLET | Refills: 1 | Status: SHIPPED | OUTPATIENT
Start: 2024-03-22 | End: 2024-10-07

## 2024-03-27 DIAGNOSIS — I10 HYPERTENSION GOAL BP (BLOOD PRESSURE) < 140/90: ICD-10-CM

## 2024-03-27 RX ORDER — POTASSIUM CHLORIDE 1500 MG/1
TABLET, EXTENDED RELEASE ORAL
Qty: 90 TABLET | Refills: 1 | Status: SHIPPED | OUTPATIENT
Start: 2024-03-27

## 2024-03-27 NOTE — TELEPHONE ENCOUNTER
Prescription approved per [facility] Refill Protocol.  Radha Kessler, RN  [facility] Triage Nurse

## 2024-05-03 DIAGNOSIS — Z12.11 COLON CANCER SCREENING: ICD-10-CM

## 2024-05-17 ENCOUNTER — ORDERS ONLY (AUTO-RELEASED) (OUTPATIENT)
Dept: FAMILY MEDICINE | Facility: CLINIC | Age: 59
End: 2024-05-17
Payer: COMMERCIAL

## 2024-05-17 DIAGNOSIS — Z12.11 COLON CANCER SCREENING: ICD-10-CM

## 2024-05-18 DIAGNOSIS — F33.0 MILD RECURRENT MAJOR DEPRESSION (H): ICD-10-CM

## 2024-05-20 RX ORDER — SERTRALINE HYDROCHLORIDE 100 MG/1
TABLET, FILM COATED ORAL
Qty: 180 TABLET | Refills: 0 | Status: SHIPPED | OUTPATIENT
Start: 2024-05-20 | End: 2024-08-15

## 2024-05-30 DIAGNOSIS — I10 HYPERTENSION GOAL BP (BLOOD PRESSURE) < 140/90: ICD-10-CM

## 2024-05-30 RX ORDER — LOSARTAN POTASSIUM AND HYDROCHLOROTHIAZIDE 25; 100 MG/1; MG/1
1 TABLET ORAL DAILY
Qty: 90 TABLET | Refills: 0 | Status: SHIPPED | OUTPATIENT
Start: 2024-05-30 | End: 2024-10-07

## 2024-06-12 DIAGNOSIS — I10 HYPERTENSION GOAL BP (BLOOD PRESSURE) < 140/90: ICD-10-CM

## 2024-06-12 RX ORDER — LOSARTAN POTASSIUM AND HYDROCHLOROTHIAZIDE 25; 100 MG/1; MG/1
1 TABLET ORAL DAILY
Qty: 90 TABLET | Refills: 0 | OUTPATIENT
Start: 2024-06-12

## 2024-06-13 LAB — NONINV COLON CA DNA+OCC BLD SCRN STL QL: NEGATIVE

## 2024-06-29 ENCOUNTER — HEALTH MAINTENANCE LETTER (OUTPATIENT)
Age: 59
End: 2024-06-29

## 2024-08-15 DIAGNOSIS — F33.0 MILD RECURRENT MAJOR DEPRESSION (H): ICD-10-CM

## 2024-08-15 RX ORDER — SERTRALINE HYDROCHLORIDE 100 MG/1
TABLET, FILM COATED ORAL
Qty: 180 TABLET | Refills: 0 | Status: SHIPPED | OUTPATIENT
Start: 2024-08-15 | End: 2024-10-07

## 2024-08-15 NOTE — TELEPHONE ENCOUNTER
"Signed, but please get patient and updated PHQ9/RAUL, thanks  Lorrie \"Salvatore\" CHEYANNE Macias   "

## 2024-10-07 ENCOUNTER — VIRTUAL VISIT (OUTPATIENT)
Dept: FAMILY MEDICINE | Facility: CLINIC | Age: 59
End: 2024-10-07
Payer: COMMERCIAL

## 2024-10-07 DIAGNOSIS — I10 HYPERTENSION GOAL BP (BLOOD PRESSURE) < 140/90: ICD-10-CM

## 2024-10-07 DIAGNOSIS — Z13.1 SCREENING FOR DIABETES MELLITUS: ICD-10-CM

## 2024-10-07 DIAGNOSIS — Z13.0 SCREENING, ANEMIA, DEFICIENCY, IRON: ICD-10-CM

## 2024-10-07 DIAGNOSIS — Z13.29 SCREENING FOR THYROID DISORDER: Primary | ICD-10-CM

## 2024-10-07 DIAGNOSIS — J30.81 ALLERGY TO DOG DANDER: ICD-10-CM

## 2024-10-07 DIAGNOSIS — F33.0 MILD RECURRENT MAJOR DEPRESSION (H): ICD-10-CM

## 2024-10-07 DIAGNOSIS — Z13.6 CARDIOVASCULAR SCREENING; LDL GOAL LESS THAN 160: ICD-10-CM

## 2024-10-07 DIAGNOSIS — G47.00 INSOMNIA, UNSPECIFIED TYPE: ICD-10-CM

## 2024-10-07 PROCEDURE — 99213 OFFICE O/P EST LOW 20 MIN: CPT | Mod: 95 | Performed by: PHYSICIAN ASSISTANT

## 2024-10-07 RX ORDER — SERTRALINE HYDROCHLORIDE 100 MG/1
TABLET, FILM COATED ORAL
Qty: 180 TABLET | Refills: 1 | Status: SHIPPED | OUTPATIENT
Start: 2024-10-07

## 2024-10-07 RX ORDER — LOSARTAN POTASSIUM AND HYDROCHLOROTHIAZIDE 25; 100 MG/1; MG/1
1 TABLET ORAL DAILY
Qty: 90 TABLET | Refills: 1 | Status: SHIPPED | OUTPATIENT
Start: 2024-10-07

## 2024-10-07 RX ORDER — BUPROPION HYDROCHLORIDE 150 MG/1
150 TABLET ORAL EVERY MORNING
Qty: 90 TABLET | Refills: 1 | Status: SHIPPED | OUTPATIENT
Start: 2024-10-07

## 2024-10-07 RX ORDER — ALBUTEROL SULFATE 90 UG/1
AEROSOL, METERED RESPIRATORY (INHALATION)
Qty: 18 G | Refills: 3 | Status: SHIPPED | OUTPATIENT
Start: 2024-10-07

## 2024-10-07 RX ORDER — HYDROXYZINE PAMOATE 50 MG/1
50 CAPSULE ORAL AT BEDTIME
Qty: 90 CAPSULE | Refills: 1 | Status: SHIPPED | OUTPATIENT
Start: 2024-10-07

## 2024-10-07 RX ORDER — TRAZODONE HYDROCHLORIDE 100 MG/1
100 TABLET ORAL AT BEDTIME
Qty: 90 TABLET | Refills: 1 | Status: SHIPPED | OUTPATIENT
Start: 2024-10-07

## 2024-10-07 ASSESSMENT — ANXIETY QUESTIONNAIRES
2. NOT BEING ABLE TO STOP OR CONTROL WORRYING: NOT AT ALL
GAD7 TOTAL SCORE: 1
3. WORRYING TOO MUCH ABOUT DIFFERENT THINGS: NOT AT ALL
6. BECOMING EASILY ANNOYED OR IRRITABLE: NOT AT ALL
7. FEELING AFRAID AS IF SOMETHING AWFUL MIGHT HAPPEN: NOT AT ALL
IF YOU CHECKED OFF ANY PROBLEMS ON THIS QUESTIONNAIRE, HOW DIFFICULT HAVE THESE PROBLEMS MADE IT FOR YOU TO DO YOUR WORK, TAKE CARE OF THINGS AT HOME, OR GET ALONG WITH OTHER PEOPLE: NOT DIFFICULT AT ALL
GAD7 TOTAL SCORE: 1
5. BEING SO RESTLESS THAT IT IS HARD TO SIT STILL: NOT AT ALL
7. FEELING AFRAID AS IF SOMETHING AWFUL MIGHT HAPPEN: NOT AT ALL
8. IF YOU CHECKED OFF ANY PROBLEMS, HOW DIFFICULT HAVE THESE MADE IT FOR YOU TO DO YOUR WORK, TAKE CARE OF THINGS AT HOME, OR GET ALONG WITH OTHER PEOPLE?: NOT DIFFICULT AT ALL
1. FEELING NERVOUS, ANXIOUS, OR ON EDGE: SEVERAL DAYS
GAD7 TOTAL SCORE: 1
4. TROUBLE RELAXING: NOT AT ALL

## 2024-10-07 ASSESSMENT — PATIENT HEALTH QUESTIONNAIRE - PHQ9
10. IF YOU CHECKED OFF ANY PROBLEMS, HOW DIFFICULT HAVE THESE PROBLEMS MADE IT FOR YOU TO DO YOUR WORK, TAKE CARE OF THINGS AT HOME, OR GET ALONG WITH OTHER PEOPLE: NOT DIFFICULT AT ALL
SUM OF ALL RESPONSES TO PHQ QUESTIONS 1-9: 1
SUM OF ALL RESPONSES TO PHQ QUESTIONS 1-9: 1

## 2024-10-07 NOTE — PROGRESS NOTES
"Trini is a 59 year old who is being evaluated via a billable video visit.    How would you like to obtain your AVS? MyChart  If the video visit is dropped, the invitation should be resent by: Text to cell phone: [phone]  Will anyone else be joining your video visit? No      Assessment & Plan     Insomnia, unspecified type  Mild recurrent major depression (H)  Long standing, chronic, controlled- regular refills sent to pharmacy. Return to clinic with any worsening or changes in symptoms or go to ER Urgent care in off hours.  - traZODone (DESYREL) 100 MG tablet; Take 1 tablet (100 mg) by mouth at bedtime.  - sertraline (ZOLOFT) 100 MG tablet; TAKE 2 TABLETS(200 MG) BY MOUTH DAILY  - hydrOXYzine (VISTARIL) 50 MG capsule; Take 1 capsule (50 mg) by mouth at bedtime.  - buPROPion (WELLBUTRIN XL) 150 MG 24 hr tablet; Take 1 tablet (150 mg) by mouth every morning.    Allergy to dog dander  - VENTOLIN  (90 Base) MCG/ACT inhaler; INHALE 2 PUFFS INTO THE LUNGS EVERY 4 HOURS AS NEEDED    Hypertension goal BP (blood pressure) < 140/90  Long standing, chronic, controlled- regular refills sent to pharmacy. Patient to continue to work on healthy diet and exercise; routine, fasting labs to be done with lab only appointment.  - losartan-hydrochlorothiazide (HYZAAR) 100-25 MG tablet; Take 1 tablet by mouth daily.    Screening for thyroid disorder  - TSH with free T4 reflex; Future    CARDIOVASCULAR SCREENING; LDL GOAL LESS THAN 160  - Lipid panel reflex to direct LDL Fasting; Future    Screening for diabetes mellitus  - Comprehensive metabolic panel; Future  - Hemoglobin A1c; Future    Screening, anemia, deficiency, iron  - CBC with Platelets & Differential; Future        BMI  Estimated body mass index is 33.67 kg/m  as calculated from the following:    Height as of 11/28/23: 1.67 m (5' 5.75\").    Weight as of 11/28/23: 93.9 kg (207 lb).   Weight management plan: Discussed healthy diet and exercise guidelines      See " Patient Instructions    Denton Feliz is a 59 year old, presenting for the following health issues:  Refill Request        10/7/2024    12:53 PM   Additional Questions   Roomed by ALEXANDER Caceres   Accompanied by n/a     History of Present Illness       Reason for visit:  Need med refills   She is taking medications regularly.     Patient needs refills on regular medications, doing well with no issues.  Recent COVID and not sleeping great.  Patient has lost weight with dietary changes and will make lab only appointment to update routine, fasting  blood work.        Review of Systems  Constitutional, HEENT, cardiovascular, pulmonary, gi and gu systems are negative, except as otherwise noted.      Objective    Vitals - Patient Reported  Pain Score: No Pain (0)      Vitals:  No vitals were obtained today due to virtual visit.    Physical Exam   GENERAL: alert and no distress  EYES: Eyes grossly normal to inspection.  No discharge or erythema, or obvious scleral/conjunctival abnormalities.  RESP: No audible wheeze, cough, or visible cyanosis.    SKIN: Visible skin clear. No significant rash, abnormal pigmentation or lesions.  NEURO: Cranial nerves grossly intact.  Mentation and speech appropriate for age.  PSYCH: Appropriate affect, tone, and pace of words          Video-Visit Details    Type of service:  Video Visit   Originating Location (pt. Location): Home    Distant Location (provider location):  Off-site  Platform used for Video Visit: Anyi  Signed Electronically by: Salvatore Macias PA-C

## 2024-10-29 ENCOUNTER — PATIENT OUTREACH (OUTPATIENT)
Dept: CARE COORDINATION | Facility: CLINIC | Age: 59
End: 2024-10-29
Payer: COMMERCIAL

## 2024-11-12 ENCOUNTER — PATIENT OUTREACH (OUTPATIENT)
Dept: CARE COORDINATION | Facility: CLINIC | Age: 59
End: 2024-11-12
Payer: COMMERCIAL

## 2024-12-09 ENCOUNTER — E-VISIT (OUTPATIENT)
Dept: FAMILY MEDICINE | Facility: CLINIC | Age: 59
End: 2024-12-09
Payer: COMMERCIAL

## 2024-12-09 DIAGNOSIS — J01.90 ACUTE SINUSITIS WITH SYMPTOMS > 10 DAYS: Primary | ICD-10-CM

## 2025-01-05 ENCOUNTER — HEALTH MAINTENANCE LETTER (OUTPATIENT)
Age: 60
End: 2025-01-05

## 2025-02-01 DIAGNOSIS — I10 HYPERTENSION GOAL BP (BLOOD PRESSURE) < 140/90: ICD-10-CM

## 2025-02-03 RX ORDER — POTASSIUM CHLORIDE 1500 MG/1
TABLET, EXTENDED RELEASE ORAL
Qty: 90 TABLET | Refills: 3 | Status: SHIPPED | OUTPATIENT
Start: 2025-02-03

## 2025-03-31 NOTE — TELEPHONE ENCOUNTER
Me   to Trini Ramirez            7/9/20 9:36 PM   Trini- We received a refill request for Wixela inhaler-It looks like this medication was discontinued in March of 2019. Are you taking this? Oanh Springer RN    Last read by Trini Ramirez at 9:46 PM on 7/9/2020.      Writer spoke to patient regarding date and time of upcoming appointment.  Writer advised patient to hold antihistamine medication 5 days prior; if applicable. Patient aware and confirmed appointment. Writer sent directions to [facility], if applicable.

## 2025-04-05 DIAGNOSIS — G47.00 INSOMNIA, UNSPECIFIED TYPE: ICD-10-CM

## 2025-04-05 DIAGNOSIS — I10 HYPERTENSION GOAL BP (BLOOD PRESSURE) < 140/90: ICD-10-CM

## 2025-04-07 RX ORDER — TRAZODONE HYDROCHLORIDE 100 MG/1
100 TABLET ORAL AT BEDTIME
Qty: 90 TABLET | Refills: 1 | Status: SHIPPED | OUTPATIENT
Start: 2025-04-07

## 2025-04-07 RX ORDER — LOSARTAN POTASSIUM AND HYDROCHLOROTHIAZIDE 25; 100 MG/1; MG/1
1 TABLET ORAL DAILY
Qty: 90 TABLET | Refills: 1 | Status: SHIPPED | OUTPATIENT
Start: 2025-04-07

## 2025-04-27 DIAGNOSIS — F33.0 MILD RECURRENT MAJOR DEPRESSION: ICD-10-CM

## 2025-04-28 RX ORDER — BUPROPION HYDROCHLORIDE 150 MG/1
150 TABLET ORAL EVERY MORNING
Qty: 90 TABLET | Refills: 0 | Status: SHIPPED | OUTPATIENT
Start: 2025-04-28

## 2025-04-28 NOTE — TELEPHONE ENCOUNTER
"Signed, but please get patient an updated PHQ9/RAUL, thanks  Lorrie \"Salvatore\" CHEYANNE Macias   "

## 2025-05-10 ENCOUNTER — MYC REFILL (OUTPATIENT)
Dept: FAMILY MEDICINE | Facility: CLINIC | Age: 60
End: 2025-05-10
Payer: COMMERCIAL

## 2025-05-10 DIAGNOSIS — F33.0 MILD RECURRENT MAJOR DEPRESSION: ICD-10-CM

## 2025-05-11 DIAGNOSIS — F33.0 MILD RECURRENT MAJOR DEPRESSION: ICD-10-CM

## 2025-05-12 ENCOUNTER — MYC MEDICAL ADVICE (OUTPATIENT)
Dept: FAMILY MEDICINE | Facility: CLINIC | Age: 60
End: 2025-05-12
Payer: COMMERCIAL

## 2025-05-12 RX ORDER — SERTRALINE HYDROCHLORIDE 100 MG/1
TABLET, FILM COATED ORAL
Qty: 180 TABLET | Refills: 1 | Status: SHIPPED | OUTPATIENT
Start: 2025-05-12

## 2025-05-12 RX ORDER — BUPROPION HYDROCHLORIDE 150 MG/1
150 TABLET ORAL EVERY MORNING
Qty: 90 TABLET | Refills: 0 | OUTPATIENT
Start: 2025-05-12

## 2025-07-13 ENCOUNTER — HEALTH MAINTENANCE LETTER (OUTPATIENT)
Age: 60
End: 2025-07-13